# Patient Record
Sex: FEMALE | Race: WHITE | NOT HISPANIC OR LATINO | Employment: OTHER | URBAN - NONMETROPOLITAN AREA
[De-identification: names, ages, dates, MRNs, and addresses within clinical notes are randomized per-mention and may not be internally consistent; named-entity substitution may affect disease eponyms.]

---

## 2018-10-01 ENCOUNTER — OFFICE VISIT (OUTPATIENT)
Dept: GASTROENTEROLOGY | Facility: CLINIC | Age: 28
End: 2018-10-01

## 2018-10-01 VITALS
WEIGHT: 227 LBS | BODY MASS INDEX: 36.48 KG/M2 | OXYGEN SATURATION: 98 % | DIASTOLIC BLOOD PRESSURE: 77 MMHG | HEART RATE: 75 BPM | HEIGHT: 66 IN | SYSTOLIC BLOOD PRESSURE: 118 MMHG

## 2018-10-01 DIAGNOSIS — R19.7 DIARRHEA, UNSPECIFIED TYPE: Primary | ICD-10-CM

## 2018-10-01 DIAGNOSIS — K21.9 GASTROESOPHAGEAL REFLUX DISEASE, ESOPHAGITIS PRESENCE NOT SPECIFIED: ICD-10-CM

## 2018-10-01 PROCEDURE — 99243 OFF/OP CNSLTJ NEW/EST LOW 30: CPT | Performed by: PHYSICIAN ASSISTANT

## 2018-10-01 RX ORDER — RANITIDINE 150 MG/1
150 TABLET ORAL 2 TIMES DAILY
COMMUNITY
End: 2018-10-01 | Stop reason: SDUPTHER

## 2018-10-01 RX ORDER — AZITHROMYCIN 1 G
1 PACKET (EA) ORAL ONCE
COMMUNITY
End: 2018-12-07

## 2018-10-01 RX ORDER — RANITIDINE 150 MG/1
150 TABLET ORAL 2 TIMES DAILY PRN
Qty: 60 TABLET | Refills: 5 | Status: ON HOLD | OUTPATIENT
Start: 2018-10-01 | End: 2020-03-13

## 2018-10-01 RX ORDER — ONDANSETRON 4 MG/1
4 TABLET, FILM COATED ORAL EVERY 8 HOURS PRN
Status: ON HOLD | COMMUNITY
End: 2021-04-22

## 2018-10-01 NOTE — PROGRESS NOTES
: 1990    Chief Complaint   Patient presents with   • Nausea   • Diarrhea       Urszula Gonzalez is a 28 y.o. female who presents to the office today as a consultation from KERV[facility] for evaluation of Nausea and Diarrhea.    History of Present Illness:  For over 1 week, she has been having diarrhea. When symptoms first started she had fever which lasted 4-5 days and a headache. She had 15-20 BMs per day. At first, she had blood and mucous stools. Her stools changed from watery to bloody to mucous. She had 2 good days but then noticed that her diarrhea returned last night. Also had episodes of fecal incontinence. She is taking Bactrim (for UTI) for the past 4-5 days and is also taking Z-katie. She had stool testing which showed positive Campylobacter. She felt terrible and had IV fluids due to dehydration during this time. Her appetite is still poor. She had vomiting during the first few days of her illness but this has resolved now. She has very severe acid reflux and water makes this much worse. She had gastric sleeve surgery approx 2 years ago. She takes OTC Zantac 150 mg up to 2 times daily as needed with some relief. She was seen in the ED at [facility] and was checked for influenza which was negative. She returned to the ED which she became worse and had CT scan which showed colitis and was given Lomotil which seemed to help. She denies any weight loss.     Before this illness, she has had intermittent and brief episodes of GI problems. She had constipation during her 2 pregnancies. She has noticed undigested food in the toilet and will have fluctuating bowel movement consistencies. No previous rectal bleeding. Paternal grandfather had colon cancer. Sister has been diagnosed with celiac disease. Mother has autoimmune disease.     Has history of salmonella from food poisoning and C diff afterwards in the past, approx 3 years ago. Also reports sores in mouth which are frequent and  cold sores when she is stressed.     Review of Systems   Constitutional: Positive for appetite change. Negative for chills, fatigue, fever and unexpected weight change.   HENT: Negative for trouble swallowing.    Eyes: Negative.    Respiratory: Negative for cough, choking, chest tightness and shortness of breath.    Cardiovascular: Negative for chest pain.   Gastrointestinal: Positive for abdominal distention, abdominal pain, blood in stool, diarrhea, nausea and rectal pain. Negative for anal bleeding, constipation and vomiting.   Endocrine: Negative.    Genitourinary: Negative for difficulty urinating.   Musculoskeletal: Negative for arthralgias.   Skin: Negative for color change, rash and wound.   Allergic/Immunologic: Negative for environmental allergies and food allergies.   Neurological: Negative for headaches.   Hematological: Does not bruise/bleed easily.   Psychiatric/Behavioral: Negative.        Past Medical History:   Diagnosis Date   • GERD (gastroesophageal reflux disease)        Past Surgical History:   Procedure Laterality Date   •  SECTION     • GASTRIC SLEEVE LAPAROSCOPIC     • TONSILLECTOMY         Family History   Problem Relation Age of Onset   • Thyroid disease Mother    • Graves' disease Mother    • Myasthenia gravis Mother    • Celiac disease Sister        Social History     Social History   • Marital status: Unknown     Social History Main Topics   • Smoking status: Never Smoker   • Smokeless tobacco: Never Used   • Alcohol use No   • Drug use: No   • Sexual activity: Defer     Other Topics Concern   • Not on file       Current Outpatient Prescriptions:   •  azithromycin (ZITHROMAX) 1 g powder, Take 1 packet by mouth 1 (One) Time., Disp: , Rfl:   •  Diphenoxylate-Atropine (LOMOTIL PO), Take  by mouth., Disp: , Rfl:   •  ondansetron (ZOFRAN) 4 MG tablet, Take 4 mg by mouth Every 8 (Eight) Hours As Needed for Nausea or Vomiting., Disp: , Rfl:   •  Sulfamethoxazole-Trimethoprim (BACTRIM  "PO), Take  by mouth., Disp: , Rfl:     Allergies:   Ciprofloxacin    Vitals:  /77 (BP Location: Right arm, Patient Position: Sitting, Cuff Size: Adult)   Pulse 75   Ht 167.6 cm (66\")   Wt 103 kg (227 lb)   SpO2 98%   BMI 36.64 kg/m²     Physical Exam   Constitutional: She is oriented to person, place, and time. She appears well-developed and well-nourished. No distress.   HENT:   Head: Normocephalic and atraumatic.   Nose: Nose normal.   Mouth/Throat: Oropharynx is clear and moist.   Eyes: Conjunctivae are normal. Right eye exhibits no discharge. Left eye exhibits no discharge. No scleral icterus.   Neck: Normal range of motion. No JVD present.   Cardiovascular: Normal rate, regular rhythm and normal heart sounds.  Exam reveals no gallop and no friction rub.    No murmur heard.  Pulmonary/Chest: Effort normal and breath sounds normal. No respiratory distress. She has no wheezes. She has no rales. She exhibits no tenderness.   Abdominal: Soft. She exhibits no mass. There is tenderness (generalized but worst in RUQ and epigastric).   Increased bowel sounds   Musculoskeletal: Normal range of motion. She exhibits no edema or deformity.   Neurological: She is alert and oriented to person, place, and time. Coordination normal.   Skin: Skin is warm and dry. No rash noted. She is not diaphoretic. No erythema.   Psychiatric: She has a normal mood and affect. Her behavior is normal. Judgment and thought content normal.   Vitals reviewed.      Assessment/Plan:  1. Diarrhea, unspecified type    2. Gastroesophageal reflux disease, esophagitis presence not specified      Because her illness has been short and recent, I recommend that she monitor symptoms for continued improvement for now. She will call our office in a few days to update on symptoms. For now, she will start daily probiotic and was given samples of Align to take once daily. Discontinue Bactrim. Finish Z-katie. We will request records from PCP and from ED " visits at [facility]. For GERD, she will take Zantac 300 mg BID. At next visit, EGD and colonoscopy will be arranged.     New Medications Ordered This Visit   Medications   • raNITIdine (ZANTAC) 150 MG tablet     Sig: Take 1 tablet by mouth 2 (Two) Times a Day As Needed for Heartburn.     Dispense:  60 tablet     Refill:  5           Return in about 6 weeks (around 11/12/2018) for recheck diarrhea.      Electronically signed 10/1/2018 8:34 PM  Urszula Mccormick PA-C, [facility]

## 2018-10-02 ENCOUNTER — TELEPHONE (OUTPATIENT)
Dept: GASTROENTEROLOGY | Facility: CLINIC | Age: 28
End: 2018-10-02

## 2018-10-02 NOTE — TELEPHONE ENCOUNTER
I reviewed records received from [facility]. It looks like I received most of the stool results but I don't see her positive result for campylobacter which she reported. Please request this.     Also- please ck on status of request from [facility], labs and stool results from there. PCP sent the CT report. Thanks.

## 2018-10-04 ENCOUNTER — TELEPHONE (OUTPATIENT)
Dept: UROLOGY | Facility: CLINIC | Age: 28
End: 2018-10-04

## 2018-10-04 DIAGNOSIS — R19.7 DIARRHEA, UNSPECIFIED TYPE: ICD-10-CM

## 2018-10-04 DIAGNOSIS — R93.2 ABNORMAL CT SCAN, GALLBLADDER: Primary | ICD-10-CM

## 2018-10-04 DIAGNOSIS — R10.811 RIGHT UPPER QUADRANT ABDOMINAL TENDERNESS WITHOUT REBOUND TENDERNESS: ICD-10-CM

## 2018-10-04 NOTE — TELEPHONE ENCOUNTER
Spoke with patient, we still dont have all results from PCP, especially campylobacter stool result.     I have ordered US gb, please arrange

## 2018-10-04 NOTE — TELEPHONE ENCOUNTER
Patient called to check if we had received her records for the [place] [facility].  I told her we had received some from them.  She would like for you to look at them and call her back because she has a few questions about them.

## 2018-10-05 NOTE — TELEPHONE ENCOUNTER
I called [facility] again and spoke to Chantelle in the lab, she found the lab Urszula is needing and said she would fax it right away

## 2018-10-05 NOTE — TELEPHONE ENCOUNTER
Also, patient had requested a call back when we received all the stool results from her PCP. Please let her know that we received stool result showing positive campylobacter and positive leukocytes (sign of an infection).

## 2018-10-16 ENCOUNTER — HOSPITAL ENCOUNTER (OUTPATIENT)
Dept: ULTRASOUND IMAGING | Facility: HOSPITAL | Age: 28
Discharge: HOME | End: 2018-10-16
Admitting: PHYSICIAN ASSISTANT

## 2018-10-16 ENCOUNTER — TELEPHONE (OUTPATIENT)
Dept: GASTROENTEROLOGY | Facility: CLINIC | Age: 28
End: 2018-10-16

## 2018-10-16 DIAGNOSIS — R93.2 ABNORMAL CT SCAN, GALLBLADDER: ICD-10-CM

## 2018-10-16 DIAGNOSIS — R10.811 RIGHT UPPER QUADRANT ABDOMINAL TENDERNESS WITHOUT REBOUND TENDERNESS: ICD-10-CM

## 2018-10-16 DIAGNOSIS — R19.7 DIARRHEA, UNSPECIFIED TYPE: ICD-10-CM

## 2018-10-16 PROCEDURE — 76705 ECHO EXAM OF ABDOMEN: CPT | Performed by: RADIOLOGY

## 2018-10-16 PROCEDURE — 76705 ECHO EXAM OF ABDOMEN: CPT

## 2018-10-23 ENCOUNTER — TELEPHONE (OUTPATIENT)
Dept: UROLOGY | Facility: CLINIC | Age: 28
End: 2018-10-23

## 2018-10-23 DIAGNOSIS — R10.11 RUQ ABDOMINAL PAIN: ICD-10-CM

## 2018-10-23 DIAGNOSIS — R11.2 INTRACTABLE VOMITING WITH NAUSEA, UNSPECIFIED VOMITING TYPE: Primary | ICD-10-CM

## 2018-10-23 DIAGNOSIS — R19.7 DIARRHEA, UNSPECIFIED TYPE: ICD-10-CM

## 2018-10-23 NOTE — TELEPHONE ENCOUNTER
I tried calling patient to let her know Urszula has ordered a hidascan. Unable to reach her. I worked the order and sent to one call to be authorized and scheduled.

## 2018-11-01 ENCOUNTER — HOSPITAL ENCOUNTER (OUTPATIENT)
Dept: NUCLEAR MEDICINE | Facility: HOSPITAL | Age: 28
Discharge: HOME | End: 2018-11-01

## 2018-11-01 DIAGNOSIS — R11.2 INTRACTABLE VOMITING WITH NAUSEA, UNSPECIFIED VOMITING TYPE: ICD-10-CM

## 2018-11-01 DIAGNOSIS — R19.7 DIARRHEA, UNSPECIFIED TYPE: ICD-10-CM

## 2018-11-01 DIAGNOSIS — R10.11 RUQ ABDOMINAL PAIN: ICD-10-CM

## 2018-11-01 PROCEDURE — 0 TECHNETIUM TC 99M MEBROFENIN KIT: Performed by: PHYSICIAN ASSISTANT

## 2018-11-01 PROCEDURE — 78226 HEPATOBILIARY SYSTEM IMAGING: CPT

## 2018-11-01 PROCEDURE — A9537 TC99M MEBROFENIN: HCPCS | Performed by: PHYSICIAN ASSISTANT

## 2018-11-01 PROCEDURE — 78226 HEPATOBILIARY SYSTEM IMAGING: CPT | Performed by: RADIOLOGY

## 2018-11-01 RX ORDER — KIT FOR THE PREPARATION OF TECHNETIUM TC 99M MEBROFENIN 45 MG/10ML
1 INJECTION, POWDER, LYOPHILIZED, FOR SOLUTION INTRAVENOUS
Status: COMPLETED | OUTPATIENT
Start: 2018-11-01 | End: 2018-11-01

## 2018-11-01 RX ADMIN — MEBROFENIN 1 DOSE: 45 INJECTION, POWDER, LYOPHILIZED, FOR SOLUTION INTRAVENOUS at 12:05

## 2018-11-05 ENCOUNTER — TELEPHONE (OUTPATIENT)
Dept: GASTROENTEROLOGY | Facility: CLINIC | Age: 28
End: 2018-11-05

## 2018-11-06 NOTE — TELEPHONE ENCOUNTER
Please let her know that it showed 38% function, which is barely above normal. (35% is normal) please have her come in for appt to discuss.

## 2018-12-07 ENCOUNTER — HOSPITAL ENCOUNTER (OUTPATIENT)
Dept: GENERAL RADIOLOGY | Facility: HOSPITAL | Age: 28
Discharge: HOME | End: 2018-12-07
Admitting: PHYSICIAN ASSISTANT

## 2018-12-07 ENCOUNTER — LAB (OUTPATIENT)
Dept: LAB | Facility: HOSPITAL | Age: 28
End: 2018-12-07

## 2018-12-07 ENCOUNTER — OFFICE VISIT (OUTPATIENT)
Dept: GASTROENTEROLOGY | Facility: CLINIC | Age: 28
End: 2018-12-07

## 2018-12-07 VITALS
HEIGHT: 66 IN | HEART RATE: 69 BPM | BODY MASS INDEX: 37.51 KG/M2 | OXYGEN SATURATION: 98 % | SYSTOLIC BLOOD PRESSURE: 116 MMHG | WEIGHT: 233.4 LBS | DIASTOLIC BLOOD PRESSURE: 79 MMHG

## 2018-12-07 DIAGNOSIS — R12 HEARTBURN: ICD-10-CM

## 2018-12-07 DIAGNOSIS — R10.9 ABDOMINAL CRAMPING: ICD-10-CM

## 2018-12-07 DIAGNOSIS — R19.7 DIARRHEA, UNSPECIFIED TYPE: ICD-10-CM

## 2018-12-07 DIAGNOSIS — R10.9 ABDOMINAL CRAMPING: Primary | ICD-10-CM

## 2018-12-07 LAB
ALBUMIN SERPL-MCNC: 4.3 G/DL (ref 3.5–5)
ALBUMIN/GLOB SERPL: 1.6 G/DL (ref 1.5–2.5)
ALP SERPL-CCNC: 73 U/L (ref 35–104)
ALT SERPL W P-5'-P-CCNC: 14 U/L (ref 10–36)
ANION GAP SERPL CALCULATED.3IONS-SCNC: 5 MMOL/L (ref 3.6–11.2)
AST SERPL-CCNC: 19 U/L (ref 10–30)
BILIRUB SERPL-MCNC: 0.3 MG/DL (ref 0.2–1.8)
BUN BLD-MCNC: 10 MG/DL (ref 7–21)
BUN/CREAT SERPL: 14.5 (ref 7–25)
CALCIUM SPEC-SCNC: 9 MG/DL (ref 7.7–10)
CHLORIDE SERPL-SCNC: 112 MMOL/L (ref 99–112)
CO2 SERPL-SCNC: 24 MMOL/L (ref 24.3–31.9)
CREAT BLD-MCNC: 0.69 MG/DL (ref 0.43–1.29)
CRP SERPL-MCNC: 0.71 MG/DL (ref 0–0.99)
GFR SERPL CREATININE-BSD FRML MDRD: 102 ML/MIN/1.73
GLOBULIN UR ELPH-MCNC: 2.7 GM/DL
GLUCOSE BLD-MCNC: 83 MG/DL (ref 70–110)
LIPASE SERPL-CCNC: 39 U/L (ref 13–60)
OSMOLALITY SERPL CALC.SUM OF ELEC: 279.4 MOSM/KG (ref 273–305)
POTASSIUM BLD-SCNC: 4.5 MMOL/L (ref 3.5–5.3)
PROT SERPL-MCNC: 7 G/DL (ref 6–8)
SODIUM BLD-SCNC: 141 MMOL/L (ref 135–153)

## 2018-12-07 PROCEDURE — 86140 C-REACTIVE PROTEIN: CPT

## 2018-12-07 PROCEDURE — 74019 RADEX ABDOMEN 2 VIEWS: CPT | Performed by: RADIOLOGY

## 2018-12-07 PROCEDURE — 86677 HELICOBACTER PYLORI ANTIBODY: CPT

## 2018-12-07 PROCEDURE — 99213 OFFICE O/P EST LOW 20 MIN: CPT | Performed by: PHYSICIAN ASSISTANT

## 2018-12-07 PROCEDURE — 74019 RADEX ABDOMEN 2 VIEWS: CPT

## 2018-12-07 PROCEDURE — 80053 COMPREHEN METABOLIC PANEL: CPT

## 2018-12-07 PROCEDURE — 36415 COLL VENOUS BLD VENIPUNCTURE: CPT

## 2018-12-07 PROCEDURE — 83690 ASSAY OF LIPASE: CPT

## 2018-12-07 RX ORDER — PANTOPRAZOLE SODIUM 40 MG/1
40 TABLET, DELAYED RELEASE ORAL
Qty: 30 TABLET | Refills: 5 | Status: SHIPPED | OUTPATIENT
Start: 2018-12-07 | End: 2019-09-09 | Stop reason: SDUPTHER

## 2018-12-07 NOTE — PROGRESS NOTES
": 1990    Chief Complaint   Patient presents with   • Diarrhea       Urszula Gonzalez is a 28 y.o. female who presents to the office today as a follow up appointment regarding Diarrhea.    History of Present Illness:  She will have intermittent diarrhea 1-2 times per week. Her stools are described as fluctuating between diarrhea and \"shreds\" of stool and points to #5-6 on the [place] Stool Scale but also admits that she has stools all over the chart. No rectal bleeding now. Her acute diarrheal illness has now resolved. She had HIDA 38% since her last visit. She had hemorrhoids around the time of her severe illness but they are better now. US gb normal. She takes ranitidine 150 mg BID for heartburn which does seem to help. There is also a bitter taste in her mouth which is severe at times.     Review of Systems   Constitutional: Positive for appetite change. Negative for chills, fatigue, fever and unexpected weight change.   HENT: Negative for trouble swallowing.    Eyes: Negative.    Respiratory: Negative for cough, choking, chest tightness and shortness of breath.    Cardiovascular: Negative for chest pain.   Gastrointestinal: Positive for diarrhea and nausea. Negative for abdominal distention, abdominal pain, anal bleeding, blood in stool, constipation, rectal pain and vomiting.   Endocrine: Negative.    Genitourinary: Negative for difficulty urinating.   Musculoskeletal: Negative for arthralgias.   Skin: Negative for color change, rash and wound.   Allergic/Immunologic: Negative for environmental allergies and food allergies.   Neurological: Negative for headaches.   Hematological: Does not bruise/bleed easily.   Psychiatric/Behavioral: Negative.        Past Medical History:   Diagnosis Date   • GERD (gastroesophageal reflux disease)        Past Surgical History:   Procedure Laterality Date   •  SECTION     • GASTRIC SLEEVE LAPAROSCOPIC     • TONSILLECTOMY         Family History   Problem Relation " "Age of Onset   • Thyroid disease Mother    • Graves' disease Mother    • Myasthenia gravis Mother    • Celiac disease Sister        Social History     Socioeconomic History   • Marital status: Unknown     Spouse name: Not on file   • Number of children: Not on file   • Years of education: Not on file   • Highest education level: Not on file   Tobacco Use   • Smoking status: Never Smoker   • Smokeless tobacco: Never Used   Substance and Sexual Activity   • Alcohol use: No   • Drug use: No   • Sexual activity: Defer       Current Outpatient Medications:   •  ondansetron (ZOFRAN) 4 MG tablet, Take 4 mg by mouth Every 8 (Eight) Hours As Needed for Nausea or Vomiting., Disp: , Rfl:   •  raNITIdine (ZANTAC) 150 MG tablet, Take 1 tablet by mouth 2 (Two) Times a Day As Needed for Heartburn., Disp: 60 tablet, Rfl: 5    Allergies:   Ciprofloxacin    Vitals:  /79 (BP Location: Left arm, Patient Position: Sitting, Cuff Size: Small Adult)   Pulse 69   Ht 167.6 cm (66\")   Wt 106 kg (233 lb 6.4 oz)   SpO2 98%   BMI 37.67 kg/m²     Physical Exam   Constitutional: She is oriented to person, place, and time. She appears well-developed and well-nourished. No distress.   HENT:   Head: Normocephalic and atraumatic.   Nose: Nose normal.   Mouth/Throat: Oropharynx is clear and moist.   Eyes: Conjunctivae are normal. Right eye exhibits no discharge. Left eye exhibits no discharge. No scleral icterus.   Neck: Normal range of motion. No JVD present.   Cardiovascular: Normal rate, regular rhythm and normal heart sounds. Exam reveals no gallop and no friction rub.   No murmur heard.  Pulmonary/Chest: Effort normal and breath sounds normal. No respiratory distress. She has no wheezes. She has no rales. She exhibits no tenderness.   Abdominal: Soft. Bowel sounds are normal. She exhibits no mass. There is tenderness (RUQ and periumbilical).   Musculoskeletal: Normal range of motion. She exhibits no edema or deformity.   Neurological: " She is alert and oriented to person, place, and time. Coordination normal.   Skin: Skin is warm and dry. No rash noted. She is not diaphoretic. No erythema.   Psychiatric: She has a normal mood and affect. Her behavior is normal. Judgment and thought content normal.   Vitals reviewed.      Assessment/Plan:  1. Abdominal cramping    2. Diarrhea, unspecified type    3. Heartburn      Orders Placed This Encounter   Procedures   • XR Abdomen Flat & Upright   • Comprehensive Metabolic Panel   • C-reactive Protein   • Lipase   • Helicobacter Pylori, IgA IgG IgM   • Ambulatory Referral to Gastroenterology   • CBC & Differential     New Medications Ordered This Visit   Medications   • pantoprazole (PROTONIX) 40 MG EC tablet     Sig: Take 1 tablet by mouth Every Morning Before Breakfast.     Dispense:  30 tablet     Refill:  5     More recommendations will be made after results have been reviewed. She will have EGD and colonoscopy at [facility] after consultation with GI provider there due to preference not to have procedures with general surgery locally. She will start taking Protonix 40 mg once daily 30 minutes before a meal for treatment of heartburn. She will be called with her results. Follow up with me after procedures to review results.         Return if symptoms worsen or fail to improve.      Electronically signed 12/7/2018 4:25 PM  Urszula Mccormick PA-C, [facility]

## 2018-12-10 ENCOUNTER — TELEPHONE (OUTPATIENT)
Dept: GASTROENTEROLOGY | Facility: CLINIC | Age: 28
End: 2018-12-10

## 2018-12-10 DIAGNOSIS — K59.04 CHRONIC IDIOPATHIC CONSTIPATION: ICD-10-CM

## 2018-12-10 DIAGNOSIS — A04.8 H. PYLORI INFECTION: Primary | ICD-10-CM

## 2018-12-10 LAB
H PYLORI IGA SER IA-ACNC: <9 UNITS (ref 0–8.9)
H PYLORI IGG SER IA-ACNC: 0.16 INDEX VALUE (ref 0–0.79)
H PYLORI IGM SER-ACNC: 14.9 UNITS (ref 0–8.9)

## 2018-12-10 RX ORDER — METRONIDAZOLE 500 MG/1
500 TABLET ORAL
Qty: 28 TABLET | Refills: 0 | Status: SHIPPED | OUTPATIENT
Start: 2018-12-10 | End: 2018-12-24

## 2018-12-10 RX ORDER — NICOTINE POLACRILEX 4 MG/1
20 GUM, CHEWING ORAL
Qty: 28 EACH | Refills: 0 | Status: SHIPPED | OUTPATIENT
Start: 2018-12-10 | End: 2018-12-24

## 2018-12-10 RX ORDER — POLYETHYLENE GLYCOL 3350 17 G/17G
17 POWDER, FOR SOLUTION ORAL DAILY
Qty: 510 G | Refills: 2 | Status: SHIPPED | OUTPATIENT
Start: 2018-12-10 | End: 2021-04-24 | Stop reason: HOSPADM

## 2018-12-10 RX ORDER — CLARITHROMYCIN 500 MG/1
500 TABLET, COATED ORAL
Qty: 28 TABLET | Refills: 0 | Status: SHIPPED | OUTPATIENT
Start: 2018-12-10 | End: 2018-12-24

## 2018-12-10 NOTE — TELEPHONE ENCOUNTER
Called lab about CBC not being collected, no explanation, mistake.     Patient's abdominal film showed constipation which is moderate. She needs to complete bowel cleanse but planning to have this completed when she has colonoscopy by MICAH FERNANDES. Please let her know that after her colonoscopy, she should start Miralax 17 g once daily to keep this from happening in the future.     Her lab showed pos H pylori. I have sent flgyl + clarithro + prilosec to take for tx. Please let her know to stop taking protonix during this therapy and resume after. Schedule f/u with me in 8 weeks. Stop protonix 2 weeks before next appt so that she can have breath test for confirmation of cure. Can take Zantac only during this period.

## 2018-12-12 NOTE — TELEPHONE ENCOUNTER
Pt called over medications I went over all her medications and how to take them she seemed a little confused on the protonix because she never received that prescription from the pharmacy I told her if she had any other questions to call us back

## 2018-12-20 ENCOUNTER — TELEPHONE (OUTPATIENT)
Dept: GASTROENTEROLOGY | Facility: CLINIC | Age: 28
End: 2018-12-20

## 2018-12-21 RX ORDER — DIPHENHYDRAMINE, LIDOCAINE, NYSTATIN
5 KIT ORAL 4 TIMES DAILY
Qty: 60 ML | Refills: 0 | Status: SHIPPED | OUTPATIENT
Start: 2018-12-21 | End: 2019-03-01

## 2018-12-21 RX ORDER — POLYETHYLENE GLYCOL 3350 17 G/17G
POWDER, FOR SOLUTION ORAL
Qty: 765 G | Refills: 5 | Status: SHIPPED | OUTPATIENT
Start: 2018-12-21 | End: 2019-03-01 | Stop reason: SDUPTHER

## 2019-01-03 ENCOUNTER — TELEPHONE (OUTPATIENT)
Dept: GASTROENTEROLOGY | Facility: CLINIC | Age: 29
End: 2019-01-03

## 2019-03-01 ENCOUNTER — OFFICE VISIT (OUTPATIENT)
Dept: GASTROENTEROLOGY | Facility: CLINIC | Age: 29
End: 2019-03-01

## 2019-03-01 ENCOUNTER — LAB (OUTPATIENT)
Dept: LAB | Facility: HOSPITAL | Age: 29
End: 2019-03-01

## 2019-03-01 VITALS
BODY MASS INDEX: 37.93 KG/M2 | SYSTOLIC BLOOD PRESSURE: 124 MMHG | DIASTOLIC BLOOD PRESSURE: 73 MMHG | OXYGEN SATURATION: 98 % | WEIGHT: 236 LBS | HEART RATE: 78 BPM | HEIGHT: 66 IN

## 2019-03-01 DIAGNOSIS — Z86.19 HISTORY OF HELICOBACTER PYLORI INFECTION: ICD-10-CM

## 2019-03-01 DIAGNOSIS — Z86.19 HISTORY OF HELICOBACTER PYLORI INFECTION: Primary | ICD-10-CM

## 2019-03-01 DIAGNOSIS — R11.0 NAUSEA: ICD-10-CM

## 2019-03-01 DIAGNOSIS — R10.9 ABDOMINAL CRAMPING: ICD-10-CM

## 2019-03-01 PROCEDURE — 83013 H PYLORI (C-13) BREATH: CPT

## 2019-03-01 PROCEDURE — 99213 OFFICE O/P EST LOW 20 MIN: CPT | Performed by: PHYSICIAN ASSISTANT

## 2019-03-01 RX ORDER — FLUOXETINE 10 MG/1
10 CAPSULE ORAL DAILY
Status: ON HOLD | COMMUNITY
End: 2020-03-13

## 2019-03-01 NOTE — PROGRESS NOTES
": 1990    Chief Complaint   Patient presents with   • H. Pylori       Urszula Gonzalez is a 28 y.o. female who presents to the office today as a follow up appointment regarding H. Pylori.    History of Present Illness:  Currently, she reports the same intermittent symptoms as previous. BMs are described as daily, stools vary on the stool scale. She does not feel constipated. Points to type 4-5, sometimes #1 on the [place] Stool Scale. Nausea is present and intermittent without vomiting. She has generalized abdominal cramping which is mild and intermittent. Heartburn is currently very severe and she has only been taking Zantac as directed and no PPIs for the past 2 weeks. Having acid reflux and \"foam\" reguritation. She has planned for appt in [facility] and plans to have endoscopy there. H pylori was positive on serum testing and she took Flagyl, clarithromycin and prilosec exactly as directed for treatment.     Review of Systems   Constitutional: Positive for appetite change. Negative for chills, fatigue, fever and unexpected weight change.   HENT: Negative for trouble swallowing.    Eyes: Negative.    Respiratory: Negative for cough, choking, chest tightness and shortness of breath.    Cardiovascular: Negative for chest pain.   Gastrointestinal: Positive for constipation, diarrhea and nausea. Negative for abdominal distention, abdominal pain, anal bleeding, blood in stool, rectal pain and vomiting.   Endocrine: Negative.    Genitourinary: Negative for difficulty urinating.   Musculoskeletal: Negative for arthralgias.   Skin: Negative for color change, rash and wound.   Allergic/Immunologic: Negative for environmental allergies and food allergies.   Neurological: Negative for headaches.   Hematological: Does not bruise/bleed easily.   Psychiatric/Behavioral: Negative.        Past Medical History:   Diagnosis Date   • GERD (gastroesophageal reflux disease)        Past Surgical History:   Procedure Laterality " "Date   •  SECTION     • GASTRIC SLEEVE LAPAROSCOPIC     • TONSILLECTOMY         Family History   Problem Relation Age of Onset   • Thyroid disease Mother    • Graves' disease Mother    • Myasthenia gravis Mother    • Celiac disease Sister        Social History     Socioeconomic History   • Marital status: Unknown     Spouse name: Not on file   • Number of children: Not on file   • Years of education: Not on file   • Highest education level: Not on file   Tobacco Use   • Smoking status: Never Smoker   • Smokeless tobacco: Never Used   Substance and Sexual Activity   • Alcohol use: No   • Drug use: No   • Sexual activity: Defer       Current Outpatient Medications:   •  FLUoxetine (PROzac) 10 MG capsule, Take 10 mg by mouth Daily., Disp: , Rfl:   •  nystatin susp + lidocaine viscous (MAGIC MOUTHWASH) oral suspension, Swish and swallow 5 mL 4 (Four) Times a Day., Disp: 60 mL, Rfl: 0  •  ondansetron (ZOFRAN) 4 MG tablet, Take 4 mg by mouth Every 8 (Eight) Hours As Needed for Nausea or Vomiting., Disp: , Rfl:   •  pantoprazole (PROTONIX) 40 MG EC tablet, Take 1 tablet by mouth Every Morning Before Breakfast., Disp: 30 tablet, Rfl: 5  •  polyethylene glycol (MIRALAX) powder, Take 17 g by mouth Daily., Disp: 510 g, Rfl: 2  •  raNITIdine (ZANTAC) 150 MG tablet, Take 1 tablet by mouth 2 (Two) Times a Day As Needed for Heartburn., Disp: 60 tablet, Rfl: 5    Allergies:   Ciprofloxacin    Vitals:  /73 (BP Location: Right arm, Patient Position: Sitting, Cuff Size: Adult)   Pulse 78   Ht 167.6 cm (66\")   Wt 107 kg (236 lb)   SpO2 98%   BMI 38.09 kg/m²     Physical Exam   Constitutional: She is oriented to person, place, and time. She appears well-developed and well-nourished. No distress.   HENT:   Head: Normocephalic and atraumatic.   Nose: Nose normal.   Mouth/Throat: Oropharynx is clear and moist.   Eyes: Conjunctivae are normal. Right eye exhibits no discharge. Left eye exhibits no discharge. No scleral " icterus.   Neck: Normal range of motion. No JVD present.   Cardiovascular: Normal rate, regular rhythm and normal heart sounds. Exam reveals no gallop and no friction rub.   No murmur heard.  Pulmonary/Chest: Effort normal and breath sounds normal. No respiratory distress. She has no wheezes. She has no rales. She exhibits no tenderness.   Abdominal: Soft. Bowel sounds are normal. She exhibits no mass. There is tenderness (epigastric, mild).   Musculoskeletal: Normal range of motion. She exhibits no edema or deformity.   Neurological: She is alert and oriented to person, place, and time. Coordination normal.   Skin: Skin is warm and dry. No rash noted. She is not diaphoretic. No erythema.   Psychiatric: She has a normal mood and affect. Her behavior is normal. Judgment and thought content normal.   Vitals reviewed.      Assessment/Plan:  1. History of Helicobacter pylori infection    2. Nausea    3. Abdominal cramping      Orders Placed This Encounter   Procedures   • H. Pylori Breath Test - , Lung     She will have urease breath test today and will be called with those results. Continue current management. Keep [facility] appt for endoscopic procedures. Can resume Protonix later today after breath test for relief of heartburn. Come back as needed.         Electronically signed 3/1/2019 3:51 PM  Urszula Mccormick PA-C, [facility]

## 2019-03-04 LAB — UREA BREATH TEST QL: NEGATIVE

## 2019-03-12 ENCOUNTER — APPOINTMENT (OUTPATIENT)
Dept: LAB | Facility: HOSPITAL | Age: 29
End: 2019-03-12

## 2019-03-12 ENCOUNTER — OFFICE VISIT (OUTPATIENT)
Dept: GASTROENTEROLOGY | Facility: CLINIC | Age: 29
End: 2019-03-12

## 2019-03-12 VITALS — DIASTOLIC BLOOD PRESSURE: 65 MMHG | HEART RATE: 66 BPM | SYSTOLIC BLOOD PRESSURE: 107 MMHG

## 2019-03-12 DIAGNOSIS — R10.11 RIGHT UPPER QUADRANT ABDOMINAL PAIN: Primary | ICD-10-CM

## 2019-03-12 DIAGNOSIS — K21.9 GASTROESOPHAGEAL REFLUX DISEASE WITHOUT ESOPHAGITIS: ICD-10-CM

## 2019-03-12 DIAGNOSIS — R14.0 BLOATING: ICD-10-CM

## 2019-03-12 PROCEDURE — 83516 IMMUNOASSAY NONANTIBODY: CPT | Performed by: INTERNAL MEDICINE

## 2019-03-12 PROCEDURE — 86255 FLUORESCENT ANTIBODY SCREEN: CPT | Performed by: INTERNAL MEDICINE

## 2019-03-12 PROCEDURE — 36415 COLL VENOUS BLD VENIPUNCTURE: CPT | Performed by: INTERNAL MEDICINE

## 2019-03-12 PROCEDURE — 99204 OFFICE O/P NEW MOD 45 MIN: CPT | Performed by: INTERNAL MEDICINE

## 2019-03-12 PROCEDURE — 82784 ASSAY IGA/IGD/IGG/IGM EACH: CPT | Performed by: INTERNAL MEDICINE

## 2019-03-12 NOTE — PROGRESS NOTES
PCP:  Katina Ibarra APRN Chasteen, Sara, PA-C  [address] [facility]  [address]  [place] [address]    Chief Complaint   Patient presents with   • Nausea     new patient   • Abdominal Pain        HPI   The patient is a 28-year-old female who has had troubles with nausea and abdominal pain.  The abdominal pain can be in the lower abdomen.  At times it can be in the upper abdomen.  Has been in the right upper quadrant at times.  She does have reflux symptoms.  She has no dysphasia.  She does feel foam coming up into her mouth at times.  She does have mouth ulcers.  She alternates between diarrhea and constipation.  She had a gallbladder workup in [place] in the past.  An ultrasound was normal.  It sounds like her HIDA scan had about a 38% ejection fraction.  She has not been tested for celiac disease.  She has increased gas and belching.  She is post gastric sleeve.  She lost 60 pounds but is gained 20 pounds back.  She has had bacterial infections of the colon including Salmonella, Campylobacter, and C. difficile.  Protonix has helped her reflux significantly.  She is a non-smoker.  Her paternal grandfather may have had colon cancer, or he may have only had prostate cancer.  There is no ulcerative colitis or Crohn's disease in the family.  There is no first-degree relatives with colorectal neoplasia.  She does have a sister with celiac disease.    Allergies   Allergen Reactions   • Ciprofloxacin Hives          Current Outpatient Medications:   •  FLUoxetine (PROzac) 10 MG capsule, Take 10 mg by mouth Daily., Disp: , Rfl:   •  ondansetron (ZOFRAN) 4 MG tablet, Take 4 mg by mouth Every 8 (Eight) Hours As Needed for Nausea or Vomiting., Disp: , Rfl:   •  pantoprazole (PROTONIX) 40 MG EC tablet, Take 1 tablet by mouth Every Morning Before Breakfast., Disp: 30 tablet, Rfl: 5  •  polyethylene glycol (MIRALAX) powder, Take 17 g by mouth Daily., Disp: 510 g, Rfl: 2  •  raNITIdine (ZANTAC) 150 MG tablet, Take 1 tablet by  complains of pain/discomfort mouth 2 (Two) Times a Day As Needed for Heartburn., Disp: 60 tablet, Rfl: 5     Past Medical History:   Diagnosis Date   • GERD (gastroesophageal reflux disease)        Past Surgical History:   Procedure Laterality Date   •  SECTION     • GASTRIC SLEEVE LAPAROSCOPIC     • TONSILLECTOMY          Social History     Socioeconomic History   • Marital status: Unknown     Spouse name: Not on file   • Number of children: Not on file   • Years of education: Not on file   • Highest education level: Not on file   Social Needs   • Financial resource strain: Not on file   • Food insecurity - worry: Not on file   • Food insecurity - inability: Not on file   • Transportation needs - medical: Not on file   • Transportation needs - non-medical: Not on file   Occupational History   • Not on file   Tobacco Use   • Smoking status: Never Smoker   • Smokeless tobacco: Never Used   Substance and Sexual Activity   • Alcohol use: No   • Drug use: No   • Sexual activity: Defer   Other Topics Concern   • Not on file   Social History Narrative   • Not on file        Family History   Problem Relation Age of Onset   • Thyroid disease Mother    • Graves' disease Mother    • Myasthenia gravis Mother    • Celiac disease Sister         Review of Systems   Constitutional: Negative for fever and unexpected weight loss.   HENT: Negative for trouble swallowing.    Eyes: Negative.    Respiratory: Negative.    Gastrointestinal: Positive for abdominal pain, constipation, diarrhea and nausea. Negative for abdominal distention, anal bleeding, blood in stool, rectal pain, vomiting, GERD and indigestion.   Endocrine: Negative.    Genitourinary: Negative.  Negative for dysuria, frequency and hematuria.   Musculoskeletal: Positive for arthralgias and myalgias.   Skin: Negative.    Allergic/Immunologic: Negative.    Neurological: Negative.    Hematological: Negative.    Psychiatric/Behavioral: Negative.         Vitals:    19 1549   BP: 107/65    Pulse: 66        Physical Exam   General Appearance: Alert, in no acute distress   Head: Normocephalic, without obvious abnormality, atraumatic   Eyes: Lids and lashes normal, conjunctivae and sclerae normal, no icterus, no pallor, corneas clear, PERRLA   Ears: Ears appear intact with no abnormalities noted   Throat: No oral lesions, no thrush, oral mucosa moist   Neck: No adenopathy, supple, trachea midline, no thyromegaly, no JVD   Lungs: Clear to auscultation,respirations regular, even and unlabored Heart: Regular rhythm and normal rate, normal S1 and S2, no murmur, no gallop, no rub, no click   Chest Wall: Symmetrical respiratory expansion   Abdomen: Normal bowel sounds, no masses, no organomegaly, soft non-tender, non-distended, no guarding, no rebound tenderness   Extremities: Moves all extremities well, no edema, no cyanosis, no redness   Skin: No bleeding, bruising or rash   Neurologic: Cranial nerves 2 - 12 grossly intact, no focal deficits       Urszula was seen today for nausea and abdominal pain.    Diagnoses and all orders for this visit:    Right upper quadrant abdominal pain  -     Celiac Comprehensive Panel    Gastroesophageal reflux disease without esophagitis    Bloating    Impressions and plan #1 history of gastroesophageal reflux and abdominal pain: She seems to be improved on the Protonix.  I would like to check a celiac panel.  Given her family history of celiac disease, it would be best to rule this out.  It could explain a lot of her symptoms.  I am going to suggest an upper endoscopy.  I would like to look for any signs of gastroparesis.  Gastroparesis is quite frequent after gastric surgery.  We would look for celiac disease as well.  We will look for peptic ulcer disease, Licea's esophagus, and esophagitis.  We will look for large hernias.  Ultimately, she may need evaluation of the colon if all else is negative and she continues to have symptoms.    Arley Pinto MD

## 2019-03-13 PROBLEM — R14.0 BLOATING: Status: ACTIVE | Noted: 2019-03-13

## 2019-03-13 PROBLEM — K21.9 GASTROESOPHAGEAL REFLUX DISEASE WITHOUT ESOPHAGITIS: Status: ACTIVE | Noted: 2019-03-13

## 2019-03-13 PROBLEM — R10.11 RIGHT UPPER QUADRANT ABDOMINAL PAIN: Status: ACTIVE | Noted: 2019-03-13

## 2019-03-14 LAB
ENDOMYSIUM IGA SER QL: NEGATIVE
GLIADIN PEPTIDE IGA SER-ACNC: 4 UNITS (ref 0–19)
GLIADIN PEPTIDE IGG SER-ACNC: 3 UNITS (ref 0–19)
IGA SERPL-MCNC: 263 MG/DL (ref 87–352)
TTG IGA SER-ACNC: <2 U/ML (ref 0–3)
TTG IGG SER-ACNC: <2 U/ML (ref 0–5)

## 2019-03-15 ENCOUNTER — OUTSIDE FACILITY SERVICE (OUTPATIENT)
Dept: GASTROENTEROLOGY | Facility: CLINIC | Age: 29
End: 2019-03-15

## 2019-03-15 ENCOUNTER — LAB REQUISITION (OUTPATIENT)
Dept: LAB | Facility: HOSPITAL | Age: 29
End: 2019-03-15

## 2019-03-15 DIAGNOSIS — R10.9 ABDOMINAL PAIN: ICD-10-CM

## 2019-03-15 PROCEDURE — 43239 EGD BIOPSY SINGLE/MULTIPLE: CPT | Performed by: INTERNAL MEDICINE

## 2019-03-15 PROCEDURE — 88305 TISSUE EXAM BY PATHOLOGIST: CPT | Performed by: INTERNAL MEDICINE

## 2019-03-18 LAB
CYTO UR: NORMAL
LAB AP CASE REPORT: NORMAL
LAB AP CLINICAL INFORMATION: NORMAL
PATH REPORT.FINAL DX SPEC: NORMAL
PATH REPORT.GROSS SPEC: NORMAL

## 2019-03-29 ENCOUNTER — LAB REQUISITION (OUTPATIENT)
Dept: LAB | Facility: HOSPITAL | Age: 29
End: 2019-03-29

## 2019-03-29 ENCOUNTER — OUTSIDE FACILITY SERVICE (OUTPATIENT)
Dept: GASTROENTEROLOGY | Facility: CLINIC | Age: 29
End: 2019-03-29

## 2019-03-29 DIAGNOSIS — Z12.11 ENCOUNTER FOR SCREENING FOR MALIGNANT NEOPLASM OF COLON: ICD-10-CM

## 2019-03-29 PROCEDURE — 45385 COLONOSCOPY W/LESION REMOVAL: CPT | Performed by: INTERNAL MEDICINE

## 2019-03-29 PROCEDURE — 88305 TISSUE EXAM BY PATHOLOGIST: CPT | Performed by: INTERNAL MEDICINE

## 2019-09-09 DIAGNOSIS — R12 HEARTBURN: ICD-10-CM

## 2019-09-09 RX ORDER — PANTOPRAZOLE SODIUM 40 MG/1
40 TABLET, DELAYED RELEASE ORAL
Qty: 30 TABLET | Refills: 5 | Status: SHIPPED | OUTPATIENT
Start: 2019-09-09 | End: 2019-12-13 | Stop reason: SDUPTHER

## 2019-12-13 ENCOUNTER — TELEPHONE (OUTPATIENT)
Dept: UROLOGY | Facility: CLINIC | Age: 29
End: 2019-12-13

## 2019-12-13 DIAGNOSIS — R12 HEARTBURN: ICD-10-CM

## 2019-12-13 RX ORDER — PANTOPRAZOLE SODIUM 40 MG/1
40 TABLET, DELAYED RELEASE ORAL
Qty: 30 TABLET | Refills: 11 | Status: SHIPPED | OUTPATIENT
Start: 2019-12-13 | End: 2019-12-13

## 2019-12-13 RX ORDER — PANTOPRAZOLE SODIUM 40 MG/1
40 TABLET, DELAYED RELEASE ORAL
Qty: 30 TABLET | Refills: 11 | Status: SHIPPED | OUTPATIENT
Start: 2019-12-13 | End: 2021-03-01 | Stop reason: SDUPTHER

## 2019-12-13 NOTE — TELEPHONE ENCOUNTER
[facility], I sent again to [facility]

## 2019-12-13 NOTE — TELEPHONE ENCOUNTER
Patient stated that her pharmacy did not have refills on the protonix.  She would like to have a refill called in to the [facility] in [facility].

## 2020-03-12 ENCOUNTER — LAB (OUTPATIENT)
Dept: LAB | Facility: HOSPITAL | Age: 30
End: 2020-03-12

## 2020-03-12 ENCOUNTER — OFFICE VISIT (OUTPATIENT)
Dept: SURGERY | Facility: CLINIC | Age: 30
End: 2020-03-12

## 2020-03-12 VITALS — HEIGHT: 66 IN | WEIGHT: 235 LBS | BODY MASS INDEX: 37.77 KG/M2

## 2020-03-12 DIAGNOSIS — K82.8 DYSFUNCTIONAL GALLBLADDER: ICD-10-CM

## 2020-03-12 DIAGNOSIS — R14.0 BLOATING: Primary | ICD-10-CM

## 2020-03-12 PROCEDURE — 99203 OFFICE O/P NEW LOW 30 MIN: CPT | Performed by: SURGERY

## 2020-03-12 PROCEDURE — 80053 COMPREHEN METABOLIC PANEL: CPT

## 2020-03-12 PROCEDURE — 36415 COLL VENOUS BLD VENIPUNCTURE: CPT

## 2020-03-12 NOTE — PROGRESS NOTES
Subjective   Urszula Gonzalez is a 29 y.o. female.     Chief Complaint: Abdominal pain and bloating    History of Present Illness She has had problems with some nausea and pain at times over the last few years. 2 years ago she had a HIDA EF of 38% with symptoms. Her LFTs were normal then. The a few days ago she was seen in another clinic. and was told her LFTs were markedly elevated. She has not had repeat xrays. She had no known history of hepatitis and had no stones on her US two years ago.     The following portions of the patient's history were reviewed and updated as appropriate: current medications, past family history, past medical history, past social history, past surgical history and problem list.    Review of Systems   Constitutional: Negative for activity change, appetite change, chills, fever and unexpected weight change.   HENT: Negative for congestion, facial swelling and sore throat.    Eyes: Negative for photophobia and visual disturbance.   Respiratory: Negative for chest tightness, shortness of breath and wheezing.    Cardiovascular: Negative for chest pain, palpitations and leg swelling.   Gastrointestinal: Positive for abdominal pain, nausea and vomiting. Negative for abdominal distention, anal bleeding, blood in stool, constipation, diarrhea and rectal pain.   Endocrine: Negative for cold intolerance, heat intolerance, polydipsia and polyuria.   Genitourinary: Negative for difficulty urinating, dysuria, flank pain and urgency.   Musculoskeletal: Negative for back pain and myalgias.   Skin: Negative for rash and wound.   Allergic/Immunologic: Negative for immunocompromised state.   Neurological: Negative for dizziness, seizures, syncope, light-headedness, numbness and headaches.   Hematological: Negative for adenopathy. Does not bruise/bleed easily.   Psychiatric/Behavioral: Negative for behavioral problems and confusion. The patient is not nervous/anxious.        Objective   Physical Exam    Constitutional: She is oriented to person, place, and time. She appears well-developed and well-nourished. She does not appear ill. No distress.   HENT:   Head: Normocephalic. Head is without laceration. Hair is normal.   Right Ear: Hearing and ear canal normal.   Left Ear: Hearing and ear canal normal.   Nose: Nose normal. No sinus tenderness. No epistaxis. Right sinus exhibits no maxillary sinus tenderness and no frontal sinus tenderness. Left sinus exhibits no maxillary sinus tenderness and no frontal sinus tenderness.   Eyes: Pupils are equal, round, and reactive to light. Conjunctivae and lids are normal.   Neck: Normal range of motion. No JVD present. No tracheal tenderness present. No tracheal deviation present. No thyroid mass and no thyromegaly present.   Cardiovascular: Normal rate and regular rhythm. Exam reveals no gallop.   No murmur heard.  Pulmonary/Chest: Effort normal and breath sounds normal. No stridor. She has no wheezes. She exhibits no tenderness.   Abdominal: Soft. Bowel sounds are normal. She exhibits no distension, no ascites and no mass. There is tenderness. There is no rebound and no guarding. No hernia.   Musculoskeletal: She exhibits no edema or deformity.   Lymphadenopathy:     She has no cervical adenopathy.     She has no axillary adenopathy.        Right: No inguinal and no supraclavicular adenopathy present.        Left: No inguinal and no supraclavicular adenopathy present.   Neurological: She is alert and oriented to person, place, and time. She exhibits normal muscle tone.   Skin: Skin is warm, dry and intact. No rash noted. No erythema. No pallor.   Psychiatric: She has a normal mood and affect. Her behavior is normal. Thought content normal.   Vitals reviewed.      Assessment/Plan   Urszula was seen today for cholelithiasis.    Diagnoses and all orders for this visit:    Bloating    Dysfunctional gallbladder    It is likely her gallbladder causing the symptoms and she would  benefit from a Margarita harry. I have still not received the labs done before so her CMP will be rechecked here.

## 2020-03-13 ENCOUNTER — ANESTHESIA (OUTPATIENT)
Dept: PERIOP | Facility: HOSPITAL | Age: 30
End: 2020-03-13

## 2020-03-13 ENCOUNTER — ANESTHESIA EVENT (OUTPATIENT)
Dept: PERIOP | Facility: HOSPITAL | Age: 30
End: 2020-03-13

## 2020-03-13 ENCOUNTER — HOSPITAL ENCOUNTER (OUTPATIENT)
Facility: HOSPITAL | Age: 30
Setting detail: HOSPITAL OUTPATIENT SURGERY
Discharge: HOME | End: 2020-03-13
Attending: SURGERY | Admitting: SURGERY

## 2020-03-13 VITALS
SYSTOLIC BLOOD PRESSURE: 138 MMHG | HEART RATE: 55 BPM | OXYGEN SATURATION: 100 % | BODY MASS INDEX: 38.33 KG/M2 | DIASTOLIC BLOOD PRESSURE: 85 MMHG | WEIGHT: 238.5 LBS | TEMPERATURE: 98.1 F | HEIGHT: 66 IN | RESPIRATION RATE: 16 BRPM

## 2020-03-13 DIAGNOSIS — K82.8 DYSFUNCTIONAL GALLBLADDER: ICD-10-CM

## 2020-03-13 DIAGNOSIS — R14.0 BLOATING: ICD-10-CM

## 2020-03-13 LAB
ALBUMIN SERPL-MCNC: 4.2 G/DL (ref 3.5–5.2)
ALBUMIN/GLOB SERPL: 1.5 G/DL
ALP SERPL-CCNC: 70 U/L (ref 39–117)
ALT SERPL W P-5'-P-CCNC: 12 U/L (ref 1–33)
ANION GAP SERPL CALCULATED.3IONS-SCNC: 14 MMOL/L (ref 5–15)
AST SERPL-CCNC: 16 U/L (ref 1–32)
BILIRUB SERPL-MCNC: 0.2 MG/DL (ref 0.2–1.2)
BUN BLD-MCNC: 10 MG/DL (ref 6–20)
BUN/CREAT SERPL: 11.4 (ref 7–25)
CALCIUM SPEC-SCNC: 9 MG/DL (ref 8.6–10.5)
CHLORIDE SERPL-SCNC: 106 MMOL/L (ref 98–107)
CO2 SERPL-SCNC: 21 MMOL/L (ref 22–29)
CREAT BLD-MCNC: 0.88 MG/DL (ref 0.57–1)
GFR SERPL CREATININE-BSD FRML MDRD: 77 ML/MIN/1.73
GLOBULIN UR ELPH-MCNC: 2.8 GM/DL
GLUCOSE BLD-MCNC: 84 MG/DL (ref 65–99)
POTASSIUM BLD-SCNC: 3.8 MMOL/L (ref 3.5–5.2)
PROT SERPL-MCNC: 7 G/DL (ref 6–8.5)
SODIUM BLD-SCNC: 141 MMOL/L (ref 136–145)

## 2020-03-13 PROCEDURE — 25010000002 DEXAMETHASONE PER 1 MG: Performed by: NURSE ANESTHETIST, CERTIFIED REGISTERED

## 2020-03-13 PROCEDURE — 25010000002 NEOSTIGMINE 10 MG/10ML SOLUTION: Performed by: NURSE ANESTHETIST, CERTIFIED REGISTERED

## 2020-03-13 PROCEDURE — 25010000002 ONDANSETRON PER 1 MG: Performed by: NURSE ANESTHETIST, CERTIFIED REGISTERED

## 2020-03-13 PROCEDURE — 25010000002 MIDAZOLAM PER 1 MG: Performed by: ANESTHESIOLOGY

## 2020-03-13 PROCEDURE — 25010000002 FENTANYL CITRATE (PF) 100 MCG/2ML SOLUTION: Performed by: NURSE ANESTHETIST, CERTIFIED REGISTERED

## 2020-03-13 PROCEDURE — 25010000003 MEPERIDINE PER 100 MG: Performed by: NURSE ANESTHETIST, CERTIFIED REGISTERED

## 2020-03-13 PROCEDURE — 47562 LAPAROSCOPIC CHOLECYSTECTOMY: CPT | Performed by: SURGERY

## 2020-03-13 PROCEDURE — 25010000002 KETOROLAC TROMETHAMINE PER 15 MG: Performed by: NURSE ANESTHETIST, CERTIFIED REGISTERED

## 2020-03-13 PROCEDURE — 25010000002 PROPOFOL 10 MG/ML EMULSION: Performed by: NURSE ANESTHETIST, CERTIFIED REGISTERED

## 2020-03-13 RX ORDER — ONDANSETRON 2 MG/ML
INJECTION INTRAMUSCULAR; INTRAVENOUS AS NEEDED
Status: DISCONTINUED | OUTPATIENT
Start: 2020-03-13 | End: 2020-03-13 | Stop reason: SURG

## 2020-03-13 RX ORDER — GLYCOPYRROLATE 0.2 MG/ML
INJECTION INTRAMUSCULAR; INTRAVENOUS AS NEEDED
Status: DISCONTINUED | OUTPATIENT
Start: 2020-03-13 | End: 2020-03-13 | Stop reason: SURG

## 2020-03-13 RX ORDER — SODIUM CHLORIDE 0.9 % (FLUSH) 0.9 %
10 SYRINGE (ML) INJECTION AS NEEDED
Status: DISCONTINUED | OUTPATIENT
Start: 2020-03-13 | End: 2020-03-13 | Stop reason: HOSPADM

## 2020-03-13 RX ORDER — SODIUM CHLORIDE, SODIUM LACTATE, POTASSIUM CHLORIDE, CALCIUM CHLORIDE 600; 310; 30; 20 MG/100ML; MG/100ML; MG/100ML; MG/100ML
125 INJECTION, SOLUTION INTRAVENOUS CONTINUOUS
Status: DISCONTINUED | OUTPATIENT
Start: 2020-03-13 | End: 2020-03-13 | Stop reason: HOSPADM

## 2020-03-13 RX ORDER — ROCURONIUM BROMIDE 10 MG/ML
INJECTION, SOLUTION INTRAVENOUS AS NEEDED
Status: DISCONTINUED | OUTPATIENT
Start: 2020-03-13 | End: 2020-03-13 | Stop reason: SURG

## 2020-03-13 RX ORDER — IPRATROPIUM BROMIDE AND ALBUTEROL SULFATE 2.5; .5 MG/3ML; MG/3ML
3 SOLUTION RESPIRATORY (INHALATION) ONCE AS NEEDED
Status: DISCONTINUED | OUTPATIENT
Start: 2020-03-13 | End: 2020-03-13 | Stop reason: HOSPADM

## 2020-03-13 RX ORDER — SODIUM CHLORIDE 0.9 % (FLUSH) 0.9 %
10 SYRINGE (ML) INJECTION EVERY 12 HOURS SCHEDULED
Status: DISCONTINUED | OUTPATIENT
Start: 2020-03-13 | End: 2020-03-13 | Stop reason: HOSPADM

## 2020-03-13 RX ORDER — NEOSTIGMINE METHYLSULFATE 1 MG/ML
INJECTION, SOLUTION INTRAVENOUS AS NEEDED
Status: DISCONTINUED | OUTPATIENT
Start: 2020-03-13 | End: 2020-03-13 | Stop reason: SURG

## 2020-03-13 RX ORDER — LIDOCAINE HYDROCHLORIDE 20 MG/ML
INJECTION, SOLUTION EPIDURAL; INFILTRATION; INTRACAUDAL; PERINEURAL AS NEEDED
Status: DISCONTINUED | OUTPATIENT
Start: 2020-03-13 | End: 2020-03-13 | Stop reason: SURG

## 2020-03-13 RX ORDER — FAMOTIDINE 10 MG/ML
INJECTION, SOLUTION INTRAVENOUS AS NEEDED
Status: DISCONTINUED | OUTPATIENT
Start: 2020-03-13 | End: 2020-03-13 | Stop reason: SURG

## 2020-03-13 RX ORDER — MAGNESIUM HYDROXIDE 1200 MG/15ML
LIQUID ORAL AS NEEDED
Status: DISCONTINUED | OUTPATIENT
Start: 2020-03-13 | End: 2020-03-13 | Stop reason: HOSPADM

## 2020-03-13 RX ORDER — ONDANSETRON 2 MG/ML
4 INJECTION INTRAMUSCULAR; INTRAVENOUS AS NEEDED
Status: DISCONTINUED | OUTPATIENT
Start: 2020-03-13 | End: 2020-03-13 | Stop reason: HOSPADM

## 2020-03-13 RX ORDER — MIDAZOLAM HYDROCHLORIDE 1 MG/ML
1 INJECTION INTRAMUSCULAR; INTRAVENOUS
Status: DISCONTINUED | OUTPATIENT
Start: 2020-03-13 | End: 2020-03-13 | Stop reason: HOSPADM

## 2020-03-13 RX ORDER — PROPOFOL 10 MG/ML
VIAL (ML) INTRAVENOUS AS NEEDED
Status: DISCONTINUED | OUTPATIENT
Start: 2020-03-13 | End: 2020-03-13 | Stop reason: SURG

## 2020-03-13 RX ORDER — SODIUM CHLORIDE 9 MG/ML
INJECTION, SOLUTION INTRAVENOUS AS NEEDED
Status: DISCONTINUED | OUTPATIENT
Start: 2020-03-13 | End: 2020-03-13 | Stop reason: HOSPADM

## 2020-03-13 RX ORDER — KETOROLAC TROMETHAMINE 30 MG/ML
INJECTION, SOLUTION INTRAMUSCULAR; INTRAVENOUS AS NEEDED
Status: DISCONTINUED | OUTPATIENT
Start: 2020-03-13 | End: 2020-03-13 | Stop reason: SURG

## 2020-03-13 RX ORDER — HYDROCODONE BITARTRATE AND ACETAMINOPHEN 5; 325 MG/1; MG/1
1 TABLET ORAL EVERY 4 HOURS PRN
Qty: 15 TABLET | Refills: 0 | Status: SHIPPED | OUTPATIENT
Start: 2020-03-13 | End: 2020-03-23

## 2020-03-13 RX ORDER — FENTANYL CITRATE 50 UG/ML
INJECTION, SOLUTION INTRAMUSCULAR; INTRAVENOUS AS NEEDED
Status: DISCONTINUED | OUTPATIENT
Start: 2020-03-13 | End: 2020-03-13 | Stop reason: SURG

## 2020-03-13 RX ORDER — HYDROCODONE BITARTRATE AND ACETAMINOPHEN 5; 325 MG/1; MG/1
1 TABLET ORAL EVERY 4 HOURS PRN
Status: DISCONTINUED | OUTPATIENT
Start: 2020-03-13 | End: 2020-03-13 | Stop reason: HOSPADM

## 2020-03-13 RX ORDER — BUPIVACAINE HYDROCHLORIDE AND EPINEPHRINE 5; 5 MG/ML; UG/ML
INJECTION, SOLUTION PERINEURAL AS NEEDED
Status: DISCONTINUED | OUTPATIENT
Start: 2020-03-13 | End: 2020-03-13 | Stop reason: HOSPADM

## 2020-03-13 RX ORDER — MEPERIDINE HYDROCHLORIDE 25 MG/ML
12.5 INJECTION INTRAMUSCULAR; INTRAVENOUS; SUBCUTANEOUS
Status: DISCONTINUED | OUTPATIENT
Start: 2020-03-13 | End: 2020-03-13 | Stop reason: HOSPADM

## 2020-03-13 RX ORDER — KETOROLAC TROMETHAMINE 10 MG/1
10 TABLET, FILM COATED ORAL EVERY 8 HOURS PRN
COMMUNITY
End: 2021-02-01

## 2020-03-13 RX ORDER — METOPROLOL TARTRATE 5 MG/5ML
INJECTION INTRAVENOUS AS NEEDED
Status: DISCONTINUED | OUTPATIENT
Start: 2020-03-13 | End: 2020-03-13 | Stop reason: SURG

## 2020-03-13 RX ORDER — DEXAMETHASONE SODIUM PHOSPHATE 4 MG/ML
INJECTION, SOLUTION INTRA-ARTICULAR; INTRALESIONAL; INTRAMUSCULAR; INTRAVENOUS; SOFT TISSUE AS NEEDED
Status: DISCONTINUED | OUTPATIENT
Start: 2020-03-13 | End: 2020-03-13 | Stop reason: SURG

## 2020-03-13 RX ORDER — FENTANYL CITRATE 50 UG/ML
50 INJECTION, SOLUTION INTRAMUSCULAR; INTRAVENOUS
Status: DISCONTINUED | OUTPATIENT
Start: 2020-03-13 | End: 2020-03-13 | Stop reason: HOSPADM

## 2020-03-13 RX ORDER — OXYCODONE HYDROCHLORIDE AND ACETAMINOPHEN 5; 325 MG/1; MG/1
1 TABLET ORAL ONCE AS NEEDED
Status: DISCONTINUED | OUTPATIENT
Start: 2020-03-13 | End: 2020-03-13 | Stop reason: HOSPADM

## 2020-03-13 RX ORDER — MIDAZOLAM HYDROCHLORIDE 1 MG/ML
2 INJECTION INTRAMUSCULAR; INTRAVENOUS
Status: DISCONTINUED | OUTPATIENT
Start: 2020-03-13 | End: 2020-03-13 | Stop reason: HOSPADM

## 2020-03-13 RX ADMIN — SODIUM CHLORIDE, POTASSIUM CHLORIDE, SODIUM LACTATE AND CALCIUM CHLORIDE 125 ML/HR: 600; 310; 30; 20 INJECTION, SOLUTION INTRAVENOUS at 09:53

## 2020-03-13 RX ADMIN — KETOROLAC TROMETHAMINE 30 MG: 30 INJECTION, SOLUTION INTRAMUSCULAR; INTRAVENOUS at 11:02

## 2020-03-13 RX ADMIN — MIDAZOLAM HYDROCHLORIDE 2 MG: 1 INJECTION, SOLUTION INTRAMUSCULAR; INTRAVENOUS at 10:27

## 2020-03-13 RX ADMIN — NEOSTIGMINE METHYLSULFATE 3 MG: 1 INJECTION, SOLUTION INTRAVENOUS at 10:48

## 2020-03-13 RX ADMIN — ROCURONIUM BROMIDE 25 MG: 10 SOLUTION INTRAVENOUS at 10:30

## 2020-03-13 RX ADMIN — LIDOCAINE HYDROCHLORIDE 60 MG: 20 INJECTION, SOLUTION EPIDURAL; INFILTRATION; INTRACAUDAL; PERINEURAL at 10:30

## 2020-03-13 RX ADMIN — GLYCOPYRROLATE 0.4 MG: 0.4 INJECTION INTRAMUSCULAR; INTRAVENOUS at 10:48

## 2020-03-13 RX ADMIN — FAMOTIDINE 20 MG: 10 INJECTION INTRAVENOUS at 10:27

## 2020-03-13 RX ADMIN — DEXAMETHASONE SODIUM PHOSPHATE 8 MG: 4 INJECTION, SOLUTION INTRAMUSCULAR; INTRAVENOUS at 10:49

## 2020-03-13 RX ADMIN — MEPERIDINE HYDROCHLORIDE 12.5 MG: 25 INJECTION INTRAMUSCULAR; INTRAVENOUS; SUBCUTANEOUS at 11:20

## 2020-03-13 RX ADMIN — PROPOFOL 150 MG: 10 INJECTION, EMULSION INTRAVENOUS at 10:30

## 2020-03-13 RX ADMIN — FENTANYL CITRATE 50 MCG: 50 INJECTION INTRAMUSCULAR; INTRAVENOUS at 11:06

## 2020-03-13 RX ADMIN — FENTANYL CITRATE 50 MCG: 50 INJECTION INTRAMUSCULAR; INTRAVENOUS at 11:00

## 2020-03-13 RX ADMIN — ONDANSETRON 4 MG: 2 INJECTION INTRAMUSCULAR; INTRAVENOUS at 10:40

## 2020-03-13 RX ADMIN — PROPOFOL 50 MG: 10 INJECTION, EMULSION INTRAVENOUS at 10:39

## 2020-03-13 RX ADMIN — FENTANYL CITRATE 50 MCG: 50 INJECTION INTRAMUSCULAR; INTRAVENOUS at 10:34

## 2020-03-13 RX ADMIN — METOPROLOL TARTRATE 2.5 MG: 5 INJECTION, SOLUTION INTRAVENOUS at 10:51

## 2020-03-13 RX ADMIN — FENTANYL CITRATE 50 MCG: 50 INJECTION INTRAMUSCULAR; INTRAVENOUS at 10:30

## 2020-03-13 NOTE — ANESTHESIA POSTPROCEDURE EVALUATION
Patient: Urszula Gonzalez    Procedure Summary     Date:  03/13/20 Room / Location:  [facility] OR 01 /  [facility] OR    Anesthesia Start:  1027 Anesthesia Stop:  1056    Procedure:  CHOLECYSTECTOMY LAPAROSCOPIC (N/A Abdomen) Diagnosis:       Bloating      Dysfunctional gallbladder      (Bloating [R14.0])      (Dysfunctional gallbladder [K82.8])    Surgeon:  Toby Murphy MD Provider:  Jamar Trejo MD    Anesthesia Type:  general ASA Status:  2          Anesthesia Type: general    Vitals  Vitals Value Taken Time   /91 3/13/2020 11:27 AM   Temp 97.1 °F (36.2 °C) 3/13/2020 10:57 AM   Pulse 51 3/13/2020 11:27 AM   Resp 19 3/13/2020 11:27 AM   SpO2 99 % 3/13/2020 11:27 AM           Post Anesthesia Care and Evaluation    Patient location during evaluation: PHASE II  Patient participation: complete - patient participated  Level of consciousness: awake and alert  Pain score: 1  Pain management: adequate  Airway patency: patent  Anesthetic complications: No anesthetic complications  PONV Status: controlled  Cardiovascular status: acceptable  Respiratory status: acceptable  Hydration status: acceptable

## 2020-03-13 NOTE — ANESTHESIA PREPROCEDURE EVALUATION
Anesthesia Evaluation     no history of anesthetic complications:  NPO Solid Status: > 8 hours  NPO Liquid Status: > 8 hours           Airway   Mallampati: II  TM distance: >3 FB  Neck ROM: full  No difficulty expected  Dental - normal exam   (+) poor dentition    Pulmonary - normal exam   Cardiovascular - normal exam        Neuro/Psych  GI/Hepatic/Renal/Endo    (+)  GERD,      Musculoskeletal     Abdominal  - normal exam    Bowel sounds: normal.   Substance History      OB/GYN          Other                        Anesthesia Plan    ASA 2     general     intravenous induction     Anesthetic plan, all risks, benefits, and alternatives have been provided, discussed and informed consent has been obtained with: patient.

## 2020-03-13 NOTE — ANESTHESIA PROCEDURE NOTES
Airway  Urgency: elective    Date/Time: 3/13/2020 10:33 AM  Airway not difficult    General Information and Staff    Patient location during procedure: OR    Indications and Patient Condition  Indications for airway management: airway protection    Preoxygenated: yes  Mask difficulty assessment: 1 - vent by mask    Final Airway Details  Final airway type: endotracheal airway      Successful airway: ETT  Cuffed: yes   Successful intubation technique: direct laryngoscopy  Facilitating devices/methods: intubating stylet  Endotracheal tube insertion site: oral  Blade: [place]  Blade size: 3  ETT size (mm): 7.0  Cormack-Lehane Classification: grade I - full view of glottis  Placement verified by: chest auscultation, capnometry and palpation of cuff   Measured from: lips  ETT/EBT  to lips (cm): 21  Number of attempts at approach: 1  Assessment: lips, teeth, and gum same as pre-op and atraumatic intubation

## 2020-03-13 NOTE — OP NOTE
CHOLECYSTECTOMY LAPAROSCOPIC  Procedure Note    Urszula Gonzalez  3/13/2020    Pre-op Diagnosis:   Bloating [R14.0]  Dysfunctional gallbladder [K82.8]    Post-op Diagnosis: same , fatty liver       Procedure(s):  CHOLECYSTECTOMY LAPAROSCOPIC    Surgeon(s):  Toby Murphy MD    Anesthesia: Anesthesia type not filed in the log.    Staff:   Circulator: Morelia eWtzel RN  Scrub Person: Yusra Ramos  Assistant: Toby Guadarrama    Estimated Blood Loss: minimal    Specimens:                Order Name Source Comment Collection Info Order Time   SURGICAL PATHOLOGY EXAM Gallbladder  Collected By: Toby Murphy MD 3/13/2020 10:41 AM     Collection Date   3/13/2020          Collection Time   10:41 AM              Drains: * No LDAs found *    Procedure: The abdomen was prepped and draped. Two 5 mm and one 11 mm port was placed. The liver was enlarged and fatty. The cystic duct was dissected out, clipped and divided. The gallbladder was taken off the liver with cautery and brought out the upper midline port. The area was irrigated and suctioned. The gas was allowed to escape and ports closed with vicryl.     Findings:  Fatty liver    Complications:  none   Grafts / Implants N/A    Toby Murphy MD     Date: 3/13/2020  Time: 10:51

## 2020-03-16 LAB
LAB AP CASE REPORT: NORMAL
PATH REPORT.FINAL DX SPEC: NORMAL

## 2020-10-06 DIAGNOSIS — Z36.89 ENCOUNTER TO ESTABLISH GESTATIONAL AGE USING ULTRASOUND: Primary | ICD-10-CM

## 2020-10-09 ENCOUNTER — RESULTS ENCOUNTER (OUTPATIENT)
Dept: OBSTETRICS AND GYNECOLOGY | Facility: CLINIC | Age: 30
End: 2020-10-09

## 2020-10-09 ENCOUNTER — INITIAL PRENATAL (OUTPATIENT)
Dept: OBSTETRICS AND GYNECOLOGY | Facility: CLINIC | Age: 30
End: 2020-10-09

## 2020-10-09 ENCOUNTER — LAB (OUTPATIENT)
Dept: LAB | Facility: HOSPITAL | Age: 30
End: 2020-10-09

## 2020-10-09 ENCOUNTER — LAB REQUISITION (OUTPATIENT)
Dept: LAB | Facility: HOSPITAL | Age: 30
End: 2020-10-09

## 2020-10-09 VITALS — DIASTOLIC BLOOD PRESSURE: 76 MMHG | BODY MASS INDEX: 40.51 KG/M2 | SYSTOLIC BLOOD PRESSURE: 120 MMHG | WEIGHT: 251 LBS

## 2020-10-09 DIAGNOSIS — Z34.80 SUPERVISION OF OTHER NORMAL PREGNANCY: ICD-10-CM

## 2020-10-09 DIAGNOSIS — Z00.00 ROUTINE GENERAL MEDICAL EXAMINATION AT A HEALTH CARE FACILITY: ICD-10-CM

## 2020-10-09 DIAGNOSIS — O09.40 ENCOUNTER FOR SUPERVISION OF HIGH RISK PREGNANCY WITH GRAND MULTIPARITY, ANTEPARTUM: ICD-10-CM

## 2020-10-09 DIAGNOSIS — Z98.891 HISTORY OF 2 CESAREAN SECTIONS: ICD-10-CM

## 2020-10-09 DIAGNOSIS — Z3A.09 9 WEEKS GESTATION OF PREGNANCY: ICD-10-CM

## 2020-10-09 DIAGNOSIS — Z98.84 HISTORY OF BARIATRIC SURGERY: ICD-10-CM

## 2020-10-09 DIAGNOSIS — Z34.80 SUPERVISION OF OTHER NORMAL PREGNANCY: Primary | ICD-10-CM

## 2020-10-09 PROBLEM — F33.0 MILD EPISODE OF RECURRENT MAJOR DEPRESSIVE DISORDER (HCC): Status: ACTIVE | Noted: 2020-10-09

## 2020-10-09 LAB
ABO GROUP BLD: NORMAL
BASOPHILS # BLD AUTO: 0.04 10*3/MM3 (ref 0–0.2)
BASOPHILS NFR BLD AUTO: 0.4 % (ref 0–1.5)
BILIRUB UR QL STRIP: NEGATIVE
BLD GP AB SCN SERPL QL: NEGATIVE
CLARITY UR: CLEAR
COLOR UR: YELLOW
DEPRECATED RDW RBC AUTO: 41.5 FL (ref 37–54)
EOSINOPHIL # BLD AUTO: 0.03 10*3/MM3 (ref 0–0.4)
EOSINOPHIL NFR BLD AUTO: 0.3 % (ref 0.3–6.2)
ERYTHROCYTE [DISTWIDTH] IN BLOOD BY AUTOMATED COUNT: 14.5 % (ref 12.3–15.4)
GLUCOSE UR STRIP-MCNC: NEGATIVE MG/DL
HBA1C MFR BLD: 5.83 % (ref 4.8–5.6)
HBV SURFACE AG SERPL QL IA: NORMAL
HCT VFR BLD AUTO: 39.8 % (ref 34–46.6)
HCV AB SER DONR QL: NORMAL
HGB BLD-MCNC: 13.1 G/DL (ref 12–15.9)
HGB UR QL STRIP.AUTO: NEGATIVE
HIV1+2 AB SER QL: NORMAL
IMM GRANULOCYTES # BLD AUTO: 0.03 10*3/MM3 (ref 0–0.05)
IMM GRANULOCYTES NFR BLD AUTO: 0.3 % (ref 0–0.5)
KETONES UR QL STRIP: NEGATIVE
LEUKOCYTE ESTERASE UR QL STRIP.AUTO: NEGATIVE
LYMPHOCYTES # BLD AUTO: 3.07 10*3/MM3 (ref 0.7–3.1)
LYMPHOCYTES NFR BLD AUTO: 31.5 % (ref 19.6–45.3)
MCH RBC QN AUTO: 26.4 PG (ref 26.6–33)
MCHC RBC AUTO-ENTMCNC: 32.9 G/DL (ref 31.5–35.7)
MCV RBC AUTO: 80.1 FL (ref 79–97)
MONOCYTES # BLD AUTO: 0.84 10*3/MM3 (ref 0.1–0.9)
MONOCYTES NFR BLD AUTO: 8.6 % (ref 5–12)
NEUTROPHILS NFR BLD AUTO: 5.75 10*3/MM3 (ref 1.7–7)
NEUTROPHILS NFR BLD AUTO: 58.9 % (ref 42.7–76)
NITRITE UR QL STRIP: NEGATIVE
NRBC BLD AUTO-RTO: 0 /100 WBC (ref 0–0.2)
PH UR STRIP.AUTO: 5.5 [PH] (ref 5–8)
PLATELET # BLD AUTO: 286 10*3/MM3 (ref 140–450)
PMV BLD AUTO: 11.1 FL (ref 6–12)
PROT UR QL STRIP: NEGATIVE
RBC # BLD AUTO: 4.97 10*6/MM3 (ref 3.77–5.28)
RH BLD: POSITIVE
SP GR UR STRIP: 1.02 (ref 1–1.03)
TSH SERPL DL<=0.05 MIU/L-ACNC: 1.95 UIU/ML (ref 0.27–4.2)
UROBILINOGEN UR QL STRIP: NORMAL
WBC # BLD AUTO: 9.76 10*3/MM3 (ref 3.4–10.8)

## 2020-10-09 PROCEDURE — 86592 SYPHILIS TEST NON-TREP QUAL: CPT

## 2020-10-09 PROCEDURE — 86901 BLOOD TYPING SEROLOGIC RH(D): CPT

## 2020-10-09 PROCEDURE — 85025 COMPLETE CBC W/AUTO DIFF WBC: CPT

## 2020-10-09 PROCEDURE — 99204 OFFICE O/P NEW MOD 45 MIN: CPT | Performed by: OBSTETRICS & GYNECOLOGY

## 2020-10-09 PROCEDURE — 86900 BLOOD TYPING SEROLOGIC ABO: CPT

## 2020-10-09 PROCEDURE — 86850 RBC ANTIBODY SCREEN: CPT

## 2020-10-09 PROCEDURE — 87340 HEPATITIS B SURFACE AG IA: CPT

## 2020-10-09 PROCEDURE — 83036 HEMOGLOBIN GLYCOSYLATED A1C: CPT

## 2020-10-09 PROCEDURE — 87086 URINE CULTURE/COLONY COUNT: CPT

## 2020-10-09 PROCEDURE — 84443 ASSAY THYROID STIM HORMONE: CPT | Performed by: OBSTETRICS & GYNECOLOGY

## 2020-10-09 PROCEDURE — 80081 OBSTETRIC PANEL INC HIV TSTG: CPT

## 2020-10-09 PROCEDURE — G0432 EIA HIV-1/HIV-2 SCREEN: HCPCS

## 2020-10-09 PROCEDURE — 36415 COLL VENOUS BLD VENIPUNCTURE: CPT

## 2020-10-09 PROCEDURE — 81003 URINALYSIS AUTO W/O SCOPE: CPT

## 2020-10-09 PROCEDURE — 86803 HEPATITIS C AB TEST: CPT

## 2020-10-09 RX ORDER — FLUOXETINE HYDROCHLORIDE 20 MG/1
20 CAPSULE ORAL DAILY
Qty: 30 CAPSULE | Refills: 5 | Status: SHIPPED | OUTPATIENT
Start: 2020-10-09 | End: 2020-10-21 | Stop reason: SDUPTHER

## 2020-10-09 NOTE — PROGRESS NOTES
Subjective     Chief Complaint   Patient presents with   • Initial Prenatal Visit     NOB with questions regarding taking her prozac she discontinued once she found out she was pregnant.  She also wants to discuss her h-pylori       Urszula Gonzalez is a 30 y.o. year old .  Patient's last menstrual period was 2020..  She presents to be seen to initiate prenatal care with our practice.    She is currently having problems with severe GERD and recurrent Major Depressive Disorder, recurrent  As it relates to the pregnancy, she has concerns regarding conduct of pregnancy in the context of Several chronic health issues and 2 previous  deliveries    Hx of 2  Sections  Initial due to FTP  Elective Repeat  Will Require Repeat  Delivery  Monitor placentation    Super Obesity  Hx of Gastric Sleeve  Will need close monitoring of weight gain and nutritional status as well as fetal growth  Persistent severe GERD symptoms will re-start PPI    Major Depressive Disorder  Long-standing  Off meds  Recurrent  No SI/HI  Anxious, lost of interest. Hypersomnolence irritable  Stopped SSRI with pregnancy  Desires to re-start Prozac  Discussed risk benefits and agree with re-starting prozac  Warnings discussed patient agrees     The following portions of the patient's history were reviewed and updated as appropriate:vital signs, allergies, current medications, past medical history, past social history, past surgical history and problem list.    Social History     Social History Narrative   • Not on file        A 14 point review of systems was negative except for: Gastrointestinal: positive for reflux symptoms  Behavioral/Psych: positive for anxiety, bad mood, depression and obesity    Objective     Physical Exam    General:  obese - Body mass index is 40.51 kg/m².   Constitutional: obese and healthy   Skin:  No suspicious lesions seen   Thyroid: normal to inspection and palpation   Lungs:  breathing is  unlabored   Heart:  Not performed.   Breasts:  Not performed.   Abdomen: soft, non-tender; no masses  no umbilical or inginual hernias are present  no hepato-splenomegaly   Pelvis: Clinical staff was present for exam  External genitalia:  normal appearance of the external genitalia including Bartholin's and [place]'s glands.  :  urethral meatus normal; urethral hypermobility is absent.  Vaginal:  normal pink mucosa without prolapse or lesions.  Cervix:  normal appearance  Uterus:  symmetrically enlarged, consisent with 10 weeks size;  Adnexa:  normal bimanual exam of the adnexa.   Musculoskeletal: negative   Neuro: normal without focal findings, mental status, speech normal, alert and oriented x3 and reflexes normal and symmetric   Psych: oriented to time, place and person, mood and affect are within normal limits, pt is a good historian; no memory problems were noted       Lab Review   No data reviewed    Imaging   Pelvic ultrasound report    High Risk Pregnancy  ASSESSMENT  1. IUP at 9w4d  Problem List Items Addressed This Visit        Other    History of 2  sections    9 weeks gestation of pregnancy    Encounter for supervision of high risk pregnancy with grand multiparity, antepartum    History of bariatric surgery      Other Visit Diagnoses     Supervision of other normal pregnancy    -  Primary    Relevant Orders    Urinalysis With Culture If Indicated -    Gardnerella vaginalis, Trichomonas vaginalis, Candida albicans, DNA - Swab, Vagina    HIV-1 / O / 2 Ag / Antibody 4th Generation    Obstetric Panel    Pap IG, Rfx HPV ASCU    Pain Management Profile (13 Drugs) Urine - Urine, Clean Catch    Urine Culture - Urine, Urine, Clean Catch    Hemoglobin A1c    TSH    Non-invasive Prenatal Testing      2.     PLAN  1. Tests ordered today:  Orders Placed This Encounter   Procedures   • Gardnerella vaginalis, Trichomonas vaginalis, Candida albicans, DNA - Swab, Vagina     Standing Status:   Future     Standing  Expiration Date:   10/9/2021   • Urine Culture - Urine, Urine, Clean Catch     Standing Status:   Future   • Urinalysis With Culture If Indicated -     Standing Status:   Future     Standing Expiration Date:   10/9/2021   • HIV-1 / O / 2 Ag / Antibody 4th Generation     Standing Status:   Future     Standing Expiration Date:   10/9/2021   • Obstetric Panel     Standing Status:   Future     Standing Expiration Date:   10/9/2021   • Pain Management Profile (13 Drugs) Urine - Urine, Clean Catch     Standing Status:   Future     Standing Expiration Date:   10/9/2021   • Hemoglobin A1c     Standing Status:   Future     Standing Expiration Date:   10/9/2021   • TSH   • Non-invasive Prenatal Testing     Standing Status:   Future     Standing Expiration Date:   10/9/2021     2. Medications prescribed today:  New Medications Ordered This Visit   Medications   • FLUoxetine (PROzac) 20 MG capsule     Sig: Take 1 capsule by mouth Daily.     Dispense:  30 capsule     Refill:  5     3. Information reviewed: exercise in pregnancy, nutrition in pregnancy, weight gain in pregnancy, work and travel restrictions during pregnancy, list of OTC medications acceptable in pregnancy and call coverage groups  4. Genetic testing reviewed: NIPT (Panorama)  5. The problem list for pregnancy was initiated today  6.       Follow up: 3 week(s)         This note was electronically signed.    Vinayak Gutierrez MD  October 9, 2020

## 2020-10-10 LAB
HOLD SPECIMEN: NORMAL
RPR SER QL: NORMAL

## 2020-10-11 LAB
BACTERIA SPEC AEROBE CULT: ABNORMAL
RUBV IGG SERPL IA-ACNC: NORMAL

## 2020-10-15 DIAGNOSIS — Z34.80 SUPERVISION OF OTHER NORMAL PREGNANCY: ICD-10-CM

## 2020-10-20 DIAGNOSIS — Z34.80 SUPERVISION OF OTHER NORMAL PREGNANCY: ICD-10-CM

## 2020-10-21 ENCOUNTER — TELEPHONE (OUTPATIENT)
Dept: OBSTETRICS AND GYNECOLOGY | Facility: CLINIC | Age: 30
End: 2020-10-21

## 2020-10-21 RX ORDER — FLUOXETINE HYDROCHLORIDE 20 MG/1
20 CAPSULE ORAL DAILY
Qty: 30 CAPSULE | Refills: 5 | Status: SHIPPED | OUTPATIENT
Start: 2020-10-21 | End: 2021-04-24 | Stop reason: HOSPADM

## 2020-10-21 NOTE — TELEPHONE ENCOUNTER
Dr. Gutierrez's patient 11w2d   [phone] returned patient's call and I advised patient Dr. Gutierrez is out of the office until tomorrow. But I will let him know that she called requesting her results. Also patient states she went to the pharmacy to  her prescription for fluoxetine and was told the Rx has not been called in. After looking under the meds tab I informed patient that her prescription was sent to her pharmacy on 10/9/2020 and her pharmacy received it @ 11:46am. Patient is asking that her prescription be sent to the pharmacy again.

## 2020-10-22 ENCOUNTER — TELEPHONE (OUTPATIENT)
Dept: OBSTETRICS AND GYNECOLOGY | Facility: CLINIC | Age: 30
End: 2020-10-22

## 2020-10-22 NOTE — TELEPHONE ENCOUNTER
----- Message from Vinayak Gutierrez MD sent at 10/22/2020  2:49 PM EDT -----      ----- Message -----  From: Juliet Rivers RegSched Rep  Sent: 10/20/2020   1:41 PM EDT  To: Vinayak Gutierrez MD

## 2020-10-28 ENCOUNTER — ROUTINE PRENATAL (OUTPATIENT)
Dept: OBSTETRICS AND GYNECOLOGY | Facility: CLINIC | Age: 30
End: 2020-10-28

## 2020-10-28 ENCOUNTER — LAB REQUISITION (OUTPATIENT)
Dept: LAB | Facility: HOSPITAL | Age: 30
End: 2020-10-28

## 2020-10-28 VITALS — BODY MASS INDEX: 40.19 KG/M2 | SYSTOLIC BLOOD PRESSURE: 120 MMHG | WEIGHT: 249 LBS | DIASTOLIC BLOOD PRESSURE: 66 MMHG

## 2020-10-28 DIAGNOSIS — Z3A.12 12 WEEKS GESTATION OF PREGNANCY: ICD-10-CM

## 2020-10-28 DIAGNOSIS — Z34.81 PRENATAL CARE, SUBSEQUENT PREGNANCY, FIRST TRIMESTER: Primary | ICD-10-CM

## 2020-10-28 DIAGNOSIS — O09.40 ENCOUNTER FOR SUPERVISION OF HIGH RISK PREGNANCY WITH GRAND MULTIPARITY, ANTEPARTUM: ICD-10-CM

## 2020-10-28 DIAGNOSIS — Z00.00 ROUTINE GENERAL MEDICAL EXAMINATION AT A HEALTH CARE FACILITY: ICD-10-CM

## 2020-10-28 DIAGNOSIS — Z98.891 HISTORY OF 2 CESAREAN SECTIONS: ICD-10-CM

## 2020-10-28 PROCEDURE — 99213 OFFICE O/P EST LOW 20 MIN: CPT | Performed by: OBSTETRICS & GYNECOLOGY

## 2020-10-28 PROCEDURE — 36415 COLL VENOUS BLD VENIPUNCTURE: CPT

## 2020-10-28 NOTE — PROGRESS NOTES
Chief Complaint   Patient presents with   • Routine Prenatal Visit     ob visit redraw panorama       HPI: Urszula is a  currently at 12w2d who today reports the following:Headache - No ; Visual change - No ; Swelling in legs - No ; Nausea - No ; Constipation - No; Diarrhea - No ; Contractions - No ; Leaking fluid - No ; Vaginal bleeding -  No.      ROS: Constitutional: negative for fever, chills, activity change, appetite change, fatigue and unexpected weight change.  Respiratory: negative for cough and dyspnea on exertion  Vitals: See prenatal flowsheet     EXAM:  Abdomen: See physician component of prenatal flowsheet   Urine glucose/protein: See physician component of prenatal flowsheet   Pelvic: See physician component of prenatal flowsheet     Prenatal Labs  Lab Results   Component Value Date    HGB 13.1 10/09/2020    RUBELLAABIGG Equivocal 10/09/2020    HEPBSAG Non-Reactive 10/09/2020    ABO A 10/09/2020    RH Positive 10/09/2020    ABSCRN Negative 10/09/2020    HZX2QPB8 Non-Reactive 10/09/2020    HEPCVIRUSABY Non-Reactive 10/09/2020    URINECX >100,000 CFU/mL Lactobacillus species (A) 10/09/2020       MDM:  Impression:Multiparous patient with medical complications, Previous C/S with planned repeat C/S and Obesity  Tests done today: re-draw NIPT  Specific topics discussed at today's visit: Questions answered regarding COVID-19 and revision of office schedule of visits due to pandemic  Next visit:none

## 2020-11-10 ENCOUNTER — TELEPHONE (OUTPATIENT)
Dept: OBSTETRICS AND GYNECOLOGY | Facility: CLINIC | Age: 30
End: 2020-11-10

## 2020-11-10 NOTE — TELEPHONE ENCOUNTER
----- Message from Vinayak Gutierrez MD sent at 11/9/2020  6:14 PM EST -----      ----- Message -----  From: Ashlee Pineda, [facility] Rep  Sent: 11/6/2020   8:25 AM EST  To: Vinayak Gutierrez MD

## 2020-11-17 ENCOUNTER — TELEPHONE (OUTPATIENT)
Dept: OBSTETRICS AND GYNECOLOGY | Facility: CLINIC | Age: 30
End: 2020-11-17

## 2020-11-17 NOTE — TELEPHONE ENCOUNTER
Dr Gutierrez patient called in regards to having pain in her right arm and she went to her primary care doctor this morning and they didn't feel comfortable with doing anything for her due to her being pregnant. She said the pain is going from her neck down to her shoulder down to her hand. She is currently 15 weeks pregnant. She isn't sure if steroids, or inflammoratories  are safe to take and she wants to know what is safe for her to control the pain. Please call.

## 2020-11-17 NOTE — TELEPHONE ENCOUNTER
Patient states she is a  and has had neck shoulder and arm pain.  Pain worse went to PCP today was given cyclobenzaprine.  Patient was told she could use tens unit.  She was wondering if a steroid shot or cortisone shot could be given during pregnancy

## 2020-11-18 NOTE — TELEPHONE ENCOUNTER
Patient advised Dr Gutierrez not familiar with prescribing steroid injection therapy but can be used in pregnancy

## 2020-12-07 ENCOUNTER — ROUTINE PRENATAL (OUTPATIENT)
Dept: OBSTETRICS AND GYNECOLOGY | Facility: CLINIC | Age: 30
End: 2020-12-07

## 2020-12-07 VITALS — WEIGHT: 250 LBS | BODY MASS INDEX: 40.35 KG/M2 | SYSTOLIC BLOOD PRESSURE: 118 MMHG | DIASTOLIC BLOOD PRESSURE: 78 MMHG

## 2020-12-07 DIAGNOSIS — O09.40 ENCOUNTER FOR SUPERVISION OF HIGH RISK PREGNANCY WITH GRAND MULTIPARITY, ANTEPARTUM: ICD-10-CM

## 2020-12-07 DIAGNOSIS — Z98.891 HISTORY OF 2 CESAREAN SECTIONS: ICD-10-CM

## 2020-12-07 DIAGNOSIS — Z98.84 HISTORY OF BARIATRIC SURGERY: ICD-10-CM

## 2020-12-07 DIAGNOSIS — Z3A.18 18 WEEKS GESTATION OF PREGNANCY: Primary | ICD-10-CM

## 2020-12-07 PROCEDURE — 99213 OFFICE O/P EST LOW 20 MIN: CPT | Performed by: OBSTETRICS & GYNECOLOGY

## 2020-12-07 NOTE — PROGRESS NOTES
Chief Complaint   Patient presents with   • Routine Prenatal Visit     ob visit patient states her last pregnancy she was diagnosed with DOTSON and she is having symptoms again of seeing stars when standing her heart races and takes her breathe       HPI: Urszula is a  currently at 18w0d who today reports the following:Headache - No ; Visual change - No ; Swelling in legs - No ; Nausea - No ; Constipation - No; Diarrhea - No ; Contractions - No ; Leaking fluid - No ; Vaginal bleeding -  No.      ROS: Constitutional: negative for fever, chills, activity change, appetite change, fatigue and unexpected weight change.  Cardiovascular: positive for tachypnea  Neurological: positive for dizziness  Vitals: See prenatal flowsheet     EXAM:  Abdomen: See physician component of prenatal flowsheet   Urine glucose/protein: See physician component of prenatal flowsheet   Pelvic: See physician component of prenatal flowsheet     Prenatal Labs  Lab Results   Component Value Date    HGB 13.1 10/09/2020    RUBELLAABIGG Equivocal 10/09/2020    HEPBSAG Non-Reactive 10/09/2020    ABO A 10/09/2020    RH Positive 10/09/2020    ABSCRN Negative 10/09/2020    [id number] Non-Reactive 10/09/2020    HEPCVIRUSABY Non-Reactive 10/09/2020    URINECX >100,000 CFU/mL Lactobacillus species (A) 10/09/2020       MDM: High Risk Pregnancy  Impression:Multiparous patient with medical complications, Previous C/S with planned repeat C/S and Obesity  Tests done today: none  Specific topics discussed at today's visit: Questions answered regarding COVID-19 and revision of office schedule of visits due to pandemic  non-medical management of lightheadedness in pregnancy  Next visit:U/S for anatomic screening

## 2020-12-21 ENCOUNTER — HOSPITAL ENCOUNTER (OUTPATIENT)
Dept: WOMENS IMAGING | Facility: HOSPITAL | Age: 30
Discharge: HOME | End: 2020-12-21

## 2020-12-21 ENCOUNTER — OFFICE VISIT (OUTPATIENT)
Dept: OBSTETRICS AND GYNECOLOGY | Facility: HOSPITAL | Age: 30
End: 2020-12-21

## 2020-12-21 ENCOUNTER — LAB (OUTPATIENT)
Dept: LAB | Facility: HOSPITAL | Age: 30
End: 2020-12-21

## 2020-12-21 VITALS — SYSTOLIC BLOOD PRESSURE: 111 MMHG | BODY MASS INDEX: 40.64 KG/M2 | WEIGHT: 251.8 LBS | DIASTOLIC BLOOD PRESSURE: 65 MMHG

## 2020-12-21 DIAGNOSIS — Z98.84 HISTORY OF BARIATRIC SURGERY: ICD-10-CM

## 2020-12-21 DIAGNOSIS — E66.01 MATERNAL MORBID OBESITY, ANTEPARTUM (HCC): ICD-10-CM

## 2020-12-21 DIAGNOSIS — Z98.891 HISTORY OF 2 CESAREAN SECTIONS: ICD-10-CM

## 2020-12-21 DIAGNOSIS — O99.210 MATERNAL MORBID OBESITY, ANTEPARTUM (HCC): ICD-10-CM

## 2020-12-21 DIAGNOSIS — Z3A.18 18 WEEKS GESTATION OF PREGNANCY: ICD-10-CM

## 2020-12-21 DIAGNOSIS — Z98.891 HISTORY OF 2 CESAREAN SECTIONS: Primary | ICD-10-CM

## 2020-12-21 PROCEDURE — 76811 OB US DETAILED SNGL FETUS: CPT | Performed by: OBSTETRICS & GYNECOLOGY

## 2020-12-21 PROCEDURE — 36415 COLL VENOUS BLD VENIPUNCTURE: CPT | Performed by: OBSTETRICS & GYNECOLOGY

## 2020-12-21 PROCEDURE — 76811 OB US DETAILED SNGL FETUS: CPT

## 2020-12-21 PROCEDURE — 82105 ALPHA-FETOPROTEIN SERUM: CPT | Performed by: OBSTETRICS & GYNECOLOGY

## 2020-12-21 RX ORDER — PRENATAL WITH FERROUS FUM AND FOLIC ACID 3080; 920; 120; 400; 22; 1.84; 3; 20; 10; 1; 12; 200; 27; 25; 2 [IU]/1; [IU]/1; MG/1; [IU]/1; MG/1; MG/1; MG/1; MG/1; MG/1; MG/1; UG/1; MG/1; MG/1; MG/1; MG/1
1 TABLET ORAL DAILY
COMMUNITY
End: 2021-06-30

## 2020-12-21 NOTE — PROGRESS NOTES
Documentation of the ultasound findings, images, and interpretations will be available in the patient's Viewpoint report located in the Chart Review Imaging tab in [employer].

## 2020-12-28 ENCOUNTER — TELEPHONE (OUTPATIENT)
Dept: WOMENS IMAGING | Facility: HOSPITAL | Age: 30
End: 2020-12-28

## 2020-12-28 LAB — Lab: NORMAL

## 2020-12-28 NOTE — TELEPHONE ENCOUNTER
----- Message from Addison Spencer MD sent at 12/28/2020 11:29 AM EST -----  Please notify patient of these normal results. Please forward the results to her referring physician.   ----- Message -----  From: Lab, Background User  Sent: 12/28/2020   7:52 AM EST  To: Addison Spencer MD

## 2020-12-28 NOTE — TELEPHONE ENCOUNTER
Pt returned call to [facility]. Notified of that Dr Spencer reviewed her labs and noted a negative screen risk for ONTD. Pt v/u. Lab results routed to her OB provider.

## 2021-01-04 ENCOUNTER — ROUTINE PRENATAL (OUTPATIENT)
Dept: OBSTETRICS AND GYNECOLOGY | Facility: CLINIC | Age: 31
End: 2021-01-04

## 2021-01-04 VITALS — BODY MASS INDEX: 40.67 KG/M2 | DIASTOLIC BLOOD PRESSURE: 66 MMHG | SYSTOLIC BLOOD PRESSURE: 120 MMHG | WEIGHT: 252 LBS

## 2021-01-04 DIAGNOSIS — Z98.891 HISTORY OF 2 CESAREAN SECTIONS: Primary | ICD-10-CM

## 2021-01-04 DIAGNOSIS — F33.0 MILD EPISODE OF RECURRENT MAJOR DEPRESSIVE DISORDER (HCC): ICD-10-CM

## 2021-01-04 DIAGNOSIS — Z3A.22 22 WEEKS GESTATION OF PREGNANCY: ICD-10-CM

## 2021-01-04 DIAGNOSIS — E66.01 MATERNAL MORBID OBESITY, ANTEPARTUM (HCC): ICD-10-CM

## 2021-01-04 DIAGNOSIS — O99.210 MATERNAL MORBID OBESITY, ANTEPARTUM (HCC): ICD-10-CM

## 2021-01-04 PROCEDURE — 99213 OFFICE O/P EST LOW 20 MIN: CPT | Performed by: OBSTETRICS & GYNECOLOGY

## 2021-01-04 NOTE — PROGRESS NOTES
Chief Complaint   Patient presents with   • Routine Prenatal Visit     ob visit       HPI: Urszula is a  currently at 22w0d who today reports the following:Headache - No ; Visual change - No ; Swelling in legs - No ; Nausea - No ; Constipation - No; Diarrhea - No ; Contractions - No ; Leaking fluid - No ; Vaginal bleeding -  No.      ROS: Pertinent items are noted in HPI.  Vitals: See prenatal flowsheet     EXAM:  Abdomen: See physician component of prenatal flowsheet   Urine glucose/protein: See physician component of prenatal flowsheet   Pelvic: See physician component of prenatal flowsheet     Prenatal Labs  Lab Results   Component Value Date    HGB 13.1 10/09/2020    RUBELLAABIGG Equivocal 10/09/2020    HEPBSAG Non-Reactive 10/09/2020    ABO A 10/09/2020    RH Positive 10/09/2020    ABSCRN Negative 10/09/2020    [id number] Non-Reactive 10/09/2020    HEPCVIRUSABY Non-Reactive 10/09/2020    URINECX >100,000 CFU/mL Lactobacillus species (A) 10/09/2020       MDM:  Impression:Previous C/S with planned repeat C/S, Obesity and mood disorder stable on high risk medication. Possible abn placentation, repeat scan planned. Hx of gastric sleeve  Tests done today: none  Specific topics discussed at today's visit: Questions answered regarding COVID-19 and revision of office schedule of visits due to pandemic  No additional counseling given - she has no specific complaints or concerns  Next visit:GCT and U/S for anatomic screening

## 2021-02-01 ENCOUNTER — ROUTINE PRENATAL (OUTPATIENT)
Dept: OBSTETRICS AND GYNECOLOGY | Facility: CLINIC | Age: 31
End: 2021-02-01

## 2021-02-01 ENCOUNTER — LAB (OUTPATIENT)
Dept: LAB | Facility: HOSPITAL | Age: 31
End: 2021-02-01

## 2021-02-01 ENCOUNTER — OFFICE VISIT (OUTPATIENT)
Dept: OBSTETRICS AND GYNECOLOGY | Facility: HOSPITAL | Age: 31
End: 2021-02-01

## 2021-02-01 ENCOUNTER — HOSPITAL ENCOUNTER (OUTPATIENT)
Dept: WOMENS IMAGING | Facility: HOSPITAL | Age: 31
Discharge: HOME | End: 2021-02-01

## 2021-02-01 VITALS — DIASTOLIC BLOOD PRESSURE: 59 MMHG | BODY MASS INDEX: 41.32 KG/M2 | SYSTOLIC BLOOD PRESSURE: 113 MMHG | WEIGHT: 256 LBS

## 2021-02-01 VITALS — DIASTOLIC BLOOD PRESSURE: 59 MMHG | WEIGHT: 256.4 LBS | BODY MASS INDEX: 41.38 KG/M2 | SYSTOLIC BLOOD PRESSURE: 113 MMHG

## 2021-02-01 DIAGNOSIS — F33.0 MILD EPISODE OF RECURRENT MAJOR DEPRESSIVE DISORDER (HCC): ICD-10-CM

## 2021-02-01 DIAGNOSIS — Z34.90 PREGNANCY, UNSPECIFIED GESTATIONAL AGE: ICD-10-CM

## 2021-02-01 DIAGNOSIS — E66.01 MATERNAL MORBID OBESITY, ANTEPARTUM (HCC): ICD-10-CM

## 2021-02-01 DIAGNOSIS — Z98.891 HISTORY OF 2 CESAREAN SECTIONS: ICD-10-CM

## 2021-02-01 DIAGNOSIS — O09.40 ENCOUNTER FOR SUPERVISION OF HIGH RISK PREGNANCY WITH GRAND MULTIPARITY, ANTEPARTUM: ICD-10-CM

## 2021-02-01 DIAGNOSIS — Z98.84 HISTORY OF BARIATRIC SURGERY: ICD-10-CM

## 2021-02-01 DIAGNOSIS — O99.210 MATERNAL MORBID OBESITY, ANTEPARTUM (HCC): ICD-10-CM

## 2021-02-01 DIAGNOSIS — O99.210 MATERNAL MORBID OBESITY, ANTEPARTUM (HCC): Primary | ICD-10-CM

## 2021-02-01 DIAGNOSIS — E66.01 MATERNAL MORBID OBESITY, ANTEPARTUM (HCC): Primary | ICD-10-CM

## 2021-02-01 DIAGNOSIS — Z3A.26 26 WEEKS GESTATION OF PREGNANCY: Primary | ICD-10-CM

## 2021-02-01 DIAGNOSIS — Z3A.22 22 WEEKS GESTATION OF PREGNANCY: ICD-10-CM

## 2021-02-01 LAB — GLUCOSE 1H P 100 G GLC PO SERPL-MCNC: 117 MG/DL (ref 65–140)

## 2021-02-01 PROCEDURE — 82950 GLUCOSE TEST: CPT

## 2021-02-01 PROCEDURE — 99214 OFFICE O/P EST MOD 30 MIN: CPT | Performed by: OBSTETRICS & GYNECOLOGY

## 2021-02-01 PROCEDURE — 36415 COLL VENOUS BLD VENIPUNCTURE: CPT

## 2021-02-01 PROCEDURE — 76816 OB US FOLLOW-UP PER FETUS: CPT

## 2021-02-01 PROCEDURE — 76816 OB US FOLLOW-UP PER FETUS: CPT | Performed by: OBSTETRICS & GYNECOLOGY

## 2021-02-01 RX ORDER — CYCLOBENZAPRINE HCL 5 MG
5 TABLET ORAL
COMMUNITY
Start: 2020-11-17 | End: 2021-04-07

## 2021-02-01 NOTE — PROGRESS NOTES
Chief Complaint   Patient presents with   • Routine Prenatal Visit     ob visit with pdc appt       HPI: Urszula is a  currently at 26w0d who today reports the following:Headache - No ; Visual change - No ; Swelling in legs - No ; Nausea - No ; Constipation - No; Diarrhea - No ; Contractions - No ; Leaking fluid - No ; Vaginal bleeding -  No.      ROS: Pertinent items are noted in HPI.  Vitals: See prenatal flowsheet     EXAM:  Abdomen: See physician component of prenatal flowsheet   Urine glucose/protein: See physician component of prenatal flowsheet   Pelvic: See physician component of prenatal flowsheet     Prenatal Labs  Lab Results   Component Value Date    HGB 13.1 10/09/2020    RUBELLAABIGG Equivocal 10/09/2020    HEPBSAG Non-Reactive 10/09/2020    ABO A 10/09/2020    RH Positive 10/09/2020    ABSCRN Negative 10/09/2020    [id number] Non-Reactive 10/09/2020    HEPCVIRUSABY Non-Reactive 10/09/2020    URINECX >100,000 CFU/mL Lactobacillus species (A) 10/09/2020       MDM: High Risk Pregnancy  Impression:Multiparous patient with medical complications, Previous C/S with planned repeat C/S, Obesity and post Bariatric Surgery, doing well from nutritional standpoint at this time. MDD: satable on high risk medication  Tests done today: GCT and U/S to complete the anatomic screening  Ultrasound reveiwed.  Specific topics discussed at today's visit: Questions answered regarding COVID-19 and revision of office schedule of visits due to pandemic  Maternal monitoring of fetal movement   labor signs and symptoms  Symptoms of preeclampsia  Next visit:none

## 2021-02-25 ENCOUNTER — ROUTINE PRENATAL (OUTPATIENT)
Dept: OBSTETRICS AND GYNECOLOGY | Facility: CLINIC | Age: 31
End: 2021-02-25

## 2021-02-25 VITALS — WEIGHT: 256 LBS | BODY MASS INDEX: 41.32 KG/M2 | DIASTOLIC BLOOD PRESSURE: 76 MMHG | SYSTOLIC BLOOD PRESSURE: 122 MMHG

## 2021-02-25 DIAGNOSIS — Z98.84 HISTORY OF BARIATRIC SURGERY: Primary | ICD-10-CM

## 2021-02-25 DIAGNOSIS — F33.0 MILD EPISODE OF RECURRENT MAJOR DEPRESSIVE DISORDER (HCC): ICD-10-CM

## 2021-02-25 DIAGNOSIS — Z3A.29 29 WEEKS GESTATION OF PREGNANCY: ICD-10-CM

## 2021-02-25 DIAGNOSIS — Z98.891 HISTORY OF 2 CESAREAN SECTIONS: ICD-10-CM

## 2021-02-25 PROCEDURE — 99214 OFFICE O/P EST MOD 30 MIN: CPT | Performed by: OBSTETRICS & GYNECOLOGY

## 2021-02-25 NOTE — PROGRESS NOTES
Chief Complaint   Patient presents with   • Routine Prenatal Visit     ob visit       HPI: Urszula is a  currently at 29w3d who today reports the following:Headache - No ; Visual change - No ; Swelling in legs - No ; Nausea - No ; Constipation - No; Diarrhea - No ; Contractions - No ; Leaking fluid - No ; Vaginal bleeding -  No.      ROS: Pertinent items are noted in HPI.  Vitals: See prenatal flowsheet     EXAM:  Abdomen: See physician component of prenatal flowsheet   Urine glucose/protein: See physician component of prenatal flowsheet   Pelvic: See physician component of prenatal flowsheet     Prenatal Labs  Lab Results   Component Value Date    HGB 13.1 10/09/2020    RUBELLAABIGG Equivocal 10/09/2020    HEPBSAG Non-Reactive 10/09/2020    ABO A 10/09/2020    RH Positive 10/09/2020    ABSCRN Negative 10/09/2020    [id number] Non-Reactive 10/09/2020    HEPCVIRUSABY Non-Reactive 10/09/2020    URINECX >100,000 CFU/mL Lactobacillus species (A) 10/09/2020       MDM:  Impression:Multiparous patient with medical complications, Previous C/S with planned repeat C/S, Obesity and mood disorder stable on SSRI. Hx of baratric surgery  Tests done today: none  Specific topics discussed at today's visit: Questions answered regarding COVID-19 and revision of office schedule of visits due to pandemic  Breast feeding  Maternal monitoring of fetal movement   labor signs and symptoms  sterilization   Next visit:none

## 2021-03-01 ENCOUNTER — TELEPHONE (OUTPATIENT)
Dept: GASTROENTEROLOGY | Facility: CLINIC | Age: 31
End: 2021-03-01

## 2021-03-01 DIAGNOSIS — R12 HEARTBURN: ICD-10-CM

## 2021-03-01 RX ORDER — PANTOPRAZOLE SODIUM 40 MG/1
40 TABLET, DELAYED RELEASE ORAL
Qty: 30 TABLET | Refills: 11 | Status: SHIPPED | OUTPATIENT
Start: 2021-03-01 | End: 2021-04-24 | Stop reason: HOSPADM

## 2021-03-09 ENCOUNTER — TELEPHONE (OUTPATIENT)
Dept: OBSTETRICS AND GYNECOLOGY | Facility: CLINIC | Age: 31
End: 2021-03-09

## 2021-03-09 NOTE — TELEPHONE ENCOUNTER
Matt pt called stating when standing her heart is beating really fast and breaks out into a hot sweat, makes her vomit or have a bowel movement, also sees stars. Has history of high blood pressure with previous pregnancy blood pressure now is 127/75. When she lays down on her left side it goes away. Please contact back and advise

## 2021-03-09 NOTE — TELEPHONE ENCOUNTER
Patient advised to increase fluid intake and try a small snack that is not sweet. Symptoms could be related to low blood sugar.  If not resolved in an hour or so to come to [facility] for evaluation.

## 2021-03-09 NOTE — TELEPHONE ENCOUNTER
If fetal movement is normal, I would suggest trying to push fluids and a small snack that is not sweet. As her BP seems normal, perhaps these are symptoms of lower blood sugar. If not resolved in an hour or so to Labor and delivery. If there is a concern about fetal movement, to labor and delivery now

## 2021-03-09 NOTE — TELEPHONE ENCOUNTER
31 weeks stating her heart races, hot flashes, sees stars that has happened a couple times this morning.  Patient states when she has these symptoms it makes her vomit or have bowel movement.  Patient is at home from work laying down on left side.  She states her blood pressure was 127/75 with heart rate 130.

## 2021-03-10 ENCOUNTER — APPOINTMENT (OUTPATIENT)
Dept: GENERAL RADIOLOGY | Facility: HOSPITAL | Age: 31
End: 2021-03-10

## 2021-03-10 ENCOUNTER — HOSPITAL ENCOUNTER (EMERGENCY)
Facility: HOSPITAL | Age: 31
Discharge: HOME | End: 2021-03-10
Attending: EMERGENCY MEDICINE | Admitting: EMERGENCY MEDICINE

## 2021-03-10 ENCOUNTER — HOSPITAL ENCOUNTER (OUTPATIENT)
Facility: HOSPITAL | Age: 31
Discharge: HOME | End: 2021-03-10
Attending: OBSTETRICS & GYNECOLOGY | Admitting: OBSTETRICS & GYNECOLOGY

## 2021-03-10 VITALS
OXYGEN SATURATION: 98 % | TEMPERATURE: 97.8 F | SYSTOLIC BLOOD PRESSURE: 132 MMHG | DIASTOLIC BLOOD PRESSURE: 74 MMHG | HEART RATE: 117 BPM | RESPIRATION RATE: 20 BRPM

## 2021-03-10 VITALS
OXYGEN SATURATION: 100 % | SYSTOLIC BLOOD PRESSURE: 142 MMHG | RESPIRATION RATE: 20 BRPM | BODY MASS INDEX: 40.98 KG/M2 | HEART RATE: 96 BPM | TEMPERATURE: 99.2 F | DIASTOLIC BLOOD PRESSURE: 88 MMHG | WEIGHT: 255 LBS | HEIGHT: 66 IN

## 2021-03-10 DIAGNOSIS — R00.0 TACHYCARDIA: Primary | ICD-10-CM

## 2021-03-10 DIAGNOSIS — R00.2 PALPITATIONS: ICD-10-CM

## 2021-03-10 DIAGNOSIS — Z3A.31 31 WEEKS GESTATION OF PREGNANCY: ICD-10-CM

## 2021-03-10 LAB
ALBUMIN SERPL-MCNC: 3.5 G/DL (ref 3.5–5.2)
ALBUMIN/GLOB SERPL: 1.1 G/DL
ALP SERPL-CCNC: 79 U/L (ref 39–117)
ALT SERPL W P-5'-P-CCNC: 7 U/L (ref 1–33)
ANION GAP SERPL CALCULATED.3IONS-SCNC: 12 MMOL/L (ref 5–15)
AST SERPL-CCNC: 12 U/L (ref 1–32)
B-HCG UR QL: POSITIVE
BASOPHILS # BLD AUTO: 0.03 10*3/MM3 (ref 0–0.2)
BASOPHILS NFR BLD AUTO: 0.2 % (ref 0–1.5)
BILIRUB SERPL-MCNC: 0.2 MG/DL (ref 0–1.2)
BILIRUB UR QL STRIP: NEGATIVE
BUN SERPL-MCNC: 7 MG/DL (ref 6–20)
BUN/CREAT SERPL: 12.7 (ref 7–25)
CALCIUM SPEC-SCNC: 9 MG/DL (ref 8.6–10.5)
CHLORIDE SERPL-SCNC: 107 MMOL/L (ref 98–107)
CLARITY UR: CLEAR
CO2 SERPL-SCNC: 18 MMOL/L (ref 22–29)
COLOR UR: YELLOW
CREAT SERPL-MCNC: 0.55 MG/DL (ref 0.57–1)
DEPRECATED RDW RBC AUTO: 41.8 FL (ref 37–54)
EOSINOPHIL # BLD AUTO: 0.04 10*3/MM3 (ref 0–0.4)
EOSINOPHIL NFR BLD AUTO: 0.3 % (ref 0.3–6.2)
ERYTHROCYTE [DISTWIDTH] IN BLOOD BY AUTOMATED COUNT: 15.3 % (ref 12.3–15.4)
GFR SERPL CREATININE-BSD FRML MDRD: 131 ML/MIN/1.73
GLOBULIN UR ELPH-MCNC: 3.2 GM/DL
GLUCOSE SERPL-MCNC: 120 MG/DL (ref 65–99)
GLUCOSE UR STRIP-MCNC: NEGATIVE MG/DL
HCT VFR BLD AUTO: 31.4 % (ref 34–46.6)
HGB BLD-MCNC: 9.7 G/DL (ref 12–15.9)
HGB UR QL STRIP.AUTO: NEGATIVE
HOLD SPECIMEN: NORMAL
IMM GRANULOCYTES # BLD AUTO: 0.07 10*3/MM3 (ref 0–0.05)
IMM GRANULOCYTES NFR BLD AUTO: 0.5 % (ref 0–0.5)
KETONES UR QL STRIP: ABNORMAL
LEUKOCYTE ESTERASE UR QL STRIP.AUTO: NEGATIVE
LYMPHOCYTES # BLD AUTO: 3.04 10*3/MM3 (ref 0.7–3.1)
LYMPHOCYTES NFR BLD AUTO: 21.1 % (ref 19.6–45.3)
MAGNESIUM SERPL-MCNC: 1.8 MG/DL (ref 1.6–2.6)
MCH RBC QN AUTO: 23.7 PG (ref 26.6–33)
MCHC RBC AUTO-ENTMCNC: 30.9 G/DL (ref 31.5–35.7)
MCV RBC AUTO: 76.8 FL (ref 79–97)
MONOCYTES # BLD AUTO: 1.25 10*3/MM3 (ref 0.1–0.9)
MONOCYTES NFR BLD AUTO: 8.7 % (ref 5–12)
NEUTROPHILS NFR BLD AUTO: 10 10*3/MM3 (ref 1.7–7)
NEUTROPHILS NFR BLD AUTO: 69.2 % (ref 42.7–76)
NITRITE UR QL STRIP: NEGATIVE
NRBC BLD AUTO-RTO: 0 /100 WBC (ref 0–0.2)
NT-PROBNP SERPL-MCNC: 44.4 PG/ML (ref 0–450)
PH UR STRIP.AUTO: 5.5 [PH] (ref 5–8)
PLATELET # BLD AUTO: 285 10*3/MM3 (ref 140–450)
PMV BLD AUTO: 9.7 FL (ref 6–12)
POTASSIUM SERPL-SCNC: 3.4 MMOL/L (ref 3.5–5.2)
PROT SERPL-MCNC: 6.7 G/DL (ref 6–8.5)
PROT UR QL STRIP: NEGATIVE
RBC # BLD AUTO: 4.09 10*6/MM3 (ref 3.77–5.28)
SODIUM SERPL-SCNC: 137 MMOL/L (ref 136–145)
SP GR UR STRIP: 1.03 (ref 1–1.03)
TROPONIN T SERPL-MCNC: <0.01 NG/ML (ref 0–0.03)
TSH SERPL DL<=0.05 MIU/L-ACNC: 1.54 UIU/ML (ref 0.27–4.2)
UROBILINOGEN UR QL STRIP: ABNORMAL
WBC # BLD AUTO: 14.43 10*3/MM3 (ref 3.4–10.8)
WHOLE BLOOD HOLD SPECIMEN: NORMAL
WHOLE BLOOD HOLD SPECIMEN: NORMAL

## 2021-03-10 PROCEDURE — 59025 FETAL NON-STRESS TEST: CPT

## 2021-03-10 PROCEDURE — 99214 OFFICE O/P EST MOD 30 MIN: CPT | Performed by: OBSTETRICS & GYNECOLOGY

## 2021-03-10 PROCEDURE — 93005 ELECTROCARDIOGRAM TRACING: CPT | Performed by: EMERGENCY MEDICINE

## 2021-03-10 PROCEDURE — 80050 GENERAL HEALTH PANEL: CPT

## 2021-03-10 PROCEDURE — 81003 URINALYSIS AUTO W/O SCOPE: CPT | Performed by: EMERGENCY MEDICINE

## 2021-03-10 PROCEDURE — 83735 ASSAY OF MAGNESIUM: CPT

## 2021-03-10 PROCEDURE — 99283 EMERGENCY DEPT VISIT LOW MDM: CPT

## 2021-03-10 PROCEDURE — 71045 X-RAY EXAM CHEST 1 VIEW: CPT

## 2021-03-10 PROCEDURE — G0463 HOSPITAL OUTPT CLINIC VISIT: HCPCS

## 2021-03-10 PROCEDURE — 84484 ASSAY OF TROPONIN QUANT: CPT

## 2021-03-10 PROCEDURE — 59025 FETAL NON-STRESS TEST: CPT | Performed by: OBSTETRICS & GYNECOLOGY

## 2021-03-10 PROCEDURE — 81025 URINE PREGNANCY TEST: CPT | Performed by: EMERGENCY MEDICINE

## 2021-03-10 PROCEDURE — 93005 ELECTROCARDIOGRAM TRACING: CPT

## 2021-03-10 PROCEDURE — 83880 ASSAY OF NATRIURETIC PEPTIDE: CPT

## 2021-03-10 RX ORDER — SODIUM CHLORIDE 0.9 % (FLUSH) 0.9 %
10 SYRINGE (ML) INJECTION AS NEEDED
Status: DISCONTINUED | OUTPATIENT
Start: 2021-03-10 | End: 2021-03-11 | Stop reason: HOSPADM

## 2021-03-10 RX ADMIN — SODIUM CHLORIDE 1000 ML: 9 INJECTION, SOLUTION INTRAVENOUS at 21:44

## 2021-03-10 NOTE — TELEPHONE ENCOUNTER
Pt called back today c/o heart rate is 150 when standing and 100 when sitting and states when it is increased that high it wipes her out. Please advise

## 2021-03-10 NOTE — H&P
[facility]  Obstetric History and Physical    Chief Complaint   Patient presents with   • Rapid Heart Rate       Subjective     Patient is a 30 y.o. female  currently at 31w2d, who presents with complaining of tachycardia.  She states she had similar symptoms with her last pregnancy.  This is associated with dizziness and weakness.  She denies chest pain or shortness of breath.  She denies contractions, vaginal bleeding or leaking fluid.    Her prenatal care is otherwise benign. Her previous obstetric/gynecological history is notable for 2 prior  sections.    The following portions of the patients history were reviewed and updated as appropriate: current medications, allergies, past medical history, past surgical history, past family history, past social history and problem list .        Prenatal Information:   Maternal Prenatal Labs  Blood Type No results found for: ABO   Rh Status No results found for: RH   Antibody Screen No results found for: ABSCRN   Gonnorhea No results found for: GCCX   Chlamydia No results found for: CLAMYDCU   RPR No results found for: RPR   Syphilis Antibody No results found for: SYPHILIS   Rubella No results found for: RUBELLAIGGIN   Hepatitis B Surface Antigen No results found for: HEPBSAG   HIV-1 Antibody No results found for: LABHIV1   Hepatitis C Antibody No results found for: HEPCAB   Rapid Urin Drug Screen No results found for: AMPMETHU, BARBITSCNUR, LABBENZSCN, LABMETHSCN, LABOPIASCN, THCURSCR, COCAINEUR, AMPHETSCREEN, PROPOXSCN, BUPRENORSCNU, METAMPSCNUR, OXYCODONESCN, TRICYCLICSCN   Group B Strep Culture No results found for: GBSANTIGEN           External Prenatal Results     Pregnancy Outside Results - Transcribed From Office Records - See Scanned Records For Details     Test Value Date Time    Hgb  13.1 g/dL 10/09/20 1216    Hct  39.8 % 10/09/20 1216    ABO  A  10/09/20 1216    Rh  Positive  10/09/20 1216    Antibody Screen  Negative  10/09/20 1216    Glucose  Fasting GTT       Glucose Tolerance Test 1 hour       Glucose Tolerance Test 3 hour       Gonorrhea (discrete)       Chlamydia (discrete)       RPR  Non-Reactive  10/09/20 1216    VDRL       Syphilis Antibody       Rubella  Equivocal  10/09/20 1216    HBsAg  Non-Reactive  10/09/20 1216    Herpes Simplex Virus PCR       Herpes Simplex VIrus Culture       HIV  Non-Reactive  10/09/20 1216    Hep C RNA Quant PCR       Hep C Antibody  Non-Reactive  10/09/20 1216    AFP       Group B Strep       GBS Susceptibility to Clindamycin       GBS Susceptibility to Erythromycin       Fetal Fibronectin       Genetic Testing, Maternal Blood             Drug Screening     Test Value Date Time    Urine Drug Screen       Amphetamine Screen       Barbiturate Screen       Benzodiazepine Screen       Methadone Screen       Phencyclidine Screen       Opiates Screen       THC Screen       Cocaine Screen       Propoxyphene Screen       Buprenorphine Screen       Methamphetamine Screen       Oxycodone Screen       Tricyclic Antidepressants Screen             Legend    ^: Historical                          Past OB History:       OB History    Para Term  AB Living   3 2 2 0 0 2   SAB TAB Ectopic Molar Multiple Live Births   0 0 0 0 0 2      # Outcome Date GA Lbr Rafael/2nd Weight Sex Delivery Anes PTL Lv   3 Current            2 Term 19 39w0d  2722 g (6 lb) M CS-Unspec EPI  LOUIE   1 Term 17 40w1d  4082 g (9 lb) F CS-Unspec EPI  LOUIE      Complications: Failure to Progress in Second Stage, PIH (pregnancy induced hypertension)       Past Medical History:  Past Medical History:   Diagnosis Date   • Anxiety    • Bradycardia, unspecified    • Dysfunctional gallbladder    • GERD (gastroesophageal reflux disease)    • H. pylori infection    • Ovarian cyst       Past Surgical History Past Surgical History:   Procedure Laterality Date   •  SECTION  2018   •  SECTION PRIMARY  2016   • CHOLECYSTECTOMY N/A  3/13/2020    Procedure: CHOLECYSTECTOMY LAPAROSCOPIC;  Surgeon: Toby Murphy MD;  Location: [facility];  Service: General;  Laterality: N/A;   • COLONOSCOPY     • ENDOSCOPY     • GASTRIC SLEEVE LAPAROSCOPIC     • TONSILLECTOMY        Family History: Family History   Problem Relation Age of Onset   • Thyroid disease Mother    • Graves' disease Mother    • Myasthenia gravis Mother    • Celiac disease Sister       Social History:  reports that she has never smoked. She has never used smokeless tobacco.   reports previous alcohol use.   reports no history of drug use.   Allergies: Ciprofloxacin  Current Medications:          No current facility-administered medications on file prior to encounter.     Current Outpatient Medications on File Prior to Encounter   Medication Sig Dispense Refill   • FLUoxetine (PROzac) 20 MG capsule Take 1 capsule by mouth Daily. 30 capsule 5   • pantoprazole (PROTONIX) 40 MG EC tablet Take 1 tablet by mouth Every Morning Before Breakfast. 30 tablet 11   • polyethylene glycol (MIRALAX) powder Take 17 g by mouth Daily. (Patient taking differently: Take 17 g by mouth Daily As Needed.) 510 g 2   • Prenatal Vit-Fe Fumarate-FA (Prenatal 27-1) 27-1 MG tablet tablet Take 1 tablet by mouth Daily.     • cyclobenzaprine (FLEXERIL) 5 MG tablet      • ondansetron (ZOFRAN) 4 MG tablet Take 4 mg by mouth Every 8 (Eight) Hours As Needed for Nausea or Vomiting.         General ROS: Pertinent items are noted in HPI, all other systems reviewed and negative    Objective       Vital Signs Range for the last 24 hours  Temperature: Temp:  [97.8 °F (36.6 °C)] 97.8 °F (36.6 °C)   Temp Source:     BP: BP: (127-128)/(73-83) 128/73   Pulse: Heart Rate:  [110-117] 117   Respirations: Resp:  [20] 20   SPO2: SpO2:  [97 %-99 %] 98 %   O2 Amount (l/min):     O2 Devices     Weight:       Physical Examination: General appearance - alert, well appearing, and in no distress, oriented to person, place, and time and  "overweight  Mental status - alert, oriented to person, place, and time, normal mood, behavior, speech, dress, motor activity, and thought processes  Abdomen-soft, nontender, nondistended, no masses or organomegaly   Abdomen, Non-Tender  Pelvic -deferred    Fetal Heart Rate Assessment  Indication: Maternal tachycardia   Start Time: 1741                end Time: 1817   NST Results: Reactive NST.  Baseline fetal heart rate 115-125 bpm.  Average variability with multiple 15 x 15 accelerations.  No decelerations.  No regular uterine contraction pattern.        Laboratory Results:   Lab Results   Component Value Date    ALKPHOS 70 2020    ALT 12 2020    AST 16 2020    CREATININE 0.88 2020    BILITOT 0.2 2020       No results found for: WBC, RBC, HGB, HCT, MCV, MCH, MCHC, RDW, RDWSD, MPV, PLT, NEUTRORELPCT, LYMPHORELPCT, MONORELPCT, EOSRELPCT, BASORELPCT, AUTOIGPER, NEUTROABS, LYMPHSABS, MONOSABS, EOSABS, BASOSABS, AUTOIGNUM, NRBC          Brief Urine Lab Results  (Last result in the past 365 days)      Color   Clarity   Blood   Leuk Est   Nitrite   Protein   CREAT   Urine HCG        10/09/20 1216 Yellow Clear Negative Negative Negative Negative                 Radiology Review: No new studies  Other Studies: None    Assessment/Plan       * No active hospital problems. *        Assessment:  1. Complaints of maternal tachycardia.  2. History of  section x2  3. Maternal morbid obesity    Plan:  1. Discharge patient.  2. Patient will be sent to the emergency room for further evaluation of her tachycardia.     Total time spent today with Urszula  was 10-19 minutes (level 2).  Of this time, > 50% was spent face-to-face time coordinating care, answering her questions and counseling regarding pathophysiology of her presenting problem along with plans for any diagnostic work-up and treatment.        Gilbert Mancilla \"Carla\" HENRIETTA Cárdenas MD  3/10/2021  18:20 EST    "

## 2021-03-11 NOTE — ED PROVIDER NOTES
EMERGENCY DEPARTMENT ENCOUNTER      Pt Name: Urszula Gonzalez  MRN: [id number]  YOB: 1990  Date of evaluation: 3/10/2021  Provider: Casey Sam MD    CHIEF COMPLAINT       Chief Complaint   Patient presents with   • Rapid Heart Rate         HISTORY OF PRESENT ILLNESS  (Location/Symptom, Timing/Onset, Context/Setting, Quality, Duration, Modifying Factors, Severity.)   Urszula Gonzalez is a 30 y.o. female who presents to the emergency department with positional tachycardia that she has noticed as her pregnancy progresses. She states that she primarily notices this when she is standing up for prolonged periods of time or when she gets up quickly from a seated or lying position. She does have a history of POTS and had similar issues with her last pregnancy later on in the pregnancy. She was seen in OB triage prior to coming down to the emergency department today and had a normal work-up there. She denies any chest pain, shortness of breath, abdominal pain, back pain, or vaginal bleeding in association with the symptoms. She is not had any vomiting or diarrhea. She describes it as mild in severity.      Nursing notes were reviewed.    REVIEW OF SYSTEMS    (2-9 systems for level 4, 10 or more for level 5)   ROS:  General:  No fevers, no chills, no weakness  Cardiovascular: Palpitations  Respiratory:  No shortness of breath, no cough, no wheezing  Gastrointestinal:  No pain, no nausea, no vomiting, no diarrhea  Musculoskeletal:  No muscle pain, no joint pain  Skin:  No rash, no easy bruising  Neurologic:  No speech problems, no headache, no extremity numbness, no extremity tingling, no extremity weakness  Psychiatric:  No anxiety  Genitourinary:  No dysuria, no hematuria    Except as noted above the remainder of the review of systems was reviewed and negative.       PAST MEDICAL HISTORY     Past Medical History:   Diagnosis Date   • Anxiety    • Bradycardia, unspecified    • Dysfunctional gallbladder    •  GERD (gastroesophageal reflux disease)    • H. pylori infection    • Ovarian cyst          SURGICAL HISTORY       Past Surgical History:   Procedure Laterality Date   •  SECTION  2018   •  SECTION PRIMARY  2016   • CHOLECYSTECTOMY N/A 3/13/2020    Procedure: CHOLECYSTECTOMY LAPAROSCOPIC;  Surgeon: Toby Murphy MD;  Location: [facility];  Service: General;  Laterality: N/A;   • COLONOSCOPY     • ENDOSCOPY     • GASTRIC SLEEVE LAPAROSCOPIC     • TONSILLECTOMY           CURRENT MEDICATIONS     No current facility-administered medications for this encounter.    Current Outpatient Medications:   •  cyclobenzaprine (FLEXERIL) 5 MG tablet, , Disp: , Rfl:   •  FLUoxetine (PROzac) 20 MG capsule, Take 1 capsule by mouth Daily., Disp: 30 capsule, Rfl: 5  •  ondansetron (ZOFRAN) 4 MG tablet, Take 4 mg by mouth Every 8 (Eight) Hours As Needed for Nausea or Vomiting., Disp: , Rfl:   •  pantoprazole (PROTONIX) 40 MG EC tablet, Take 1 tablet by mouth Every Morning Before Breakfast., Disp: 30 tablet, Rfl: 11  •  polyethylene glycol (MIRALAX) powder, Take 17 g by mouth Daily. (Patient taking differently: Take 17 g by mouth Daily As Needed.), Disp: 510 g, Rfl: 2  •  Prenatal Vit-Fe Fumarate-FA (Prenatal 27-) 27-1 MG tablet tablet, Take 1 tablet by mouth Daily., Disp: , Rfl:     ALLERGIES     Ciprofloxacin    FAMILY HISTORY       Family History   Problem Relation Age of Onset   • Thyroid disease Mother    • Graves' disease Mother    • Myasthenia gravis Mother    • Celiac disease Sister           SOCIAL HISTORY       Social History     Socioeconomic History   • Marital status:      Spouse name: nate gary   • Number of children: 2   • Years of education: Not on file   • Highest education level: High school graduate   Tobacco Use   • Smoking status: Never Smoker   • Smokeless tobacco: Never Used   Substance and Sexual Activity   • Alcohol use: Not Currently     Comment: social   • Drug use: No  "  • Sexual activity: Defer     Partners: Male     Birth control/protection: None         PHYSICAL EXAM    (up to 7 for level 4, 8 or more for level 5)     Vitals:    03/10/21 1839 03/10/21 2200   BP: 142/88    BP Location: Right arm    Patient Position: Sitting    Pulse: 111 96   Resp: 20    Temp: 99.2 °F (37.3 °C)    TempSrc: Oral    SpO2: 100% 100%   Weight: 116 kg (255 lb)    Height: 167.6 cm (66\")        Physical Exam  General: Awake, alert, no acute distress.  HEENT: Conjunctiva normal.  Neck: Trachea midline.  Cardiac: Tacky, regular rhythm, no murmurs, rubs, or gallops  Lungs: Lungs are clear to auscultation, there is no wheezing, rhonchi, or rales. There is no use of accessory muscles.  Chest wall: There is no tenderness to palpation over the chest wall or over ribs  Abdomen: Abdomen is soft, nontender, nondistended. There are no firm or pulsatile masses, no rebound rigidity or guarding.   Musculoskeletal: No deformity.  Neuro: Alert and oriented x 4.  Dermatology: Skin is warm and dry  Psych: Mentation is grossly normal, cognition is grossly normal. Affect is appropriate.        DIAGNOSTIC RESULTS     EKG: All EKG's are interpreted by the Emergency Department Physician who either signs or Co-signs this chart in the absence of a cardiologist.    Sinus tachycardia rate 105, no acute ST segment or T wave changes, normal intervals, no ectopy    RADIOLOGY:   Non-plain film images such as CT, Ultrasound and MRI are read by the radiologist. Plain radiographic images are visualized and preliminarily interpreted by the emergency physician with the below findings:      [x] Radiologist's Report Reviewed:  XR Chest 1 View   Final Result   No acute cardiopulmonary findings.      Signer Name: Juan M Titus MD    Signed: 3/10/2021 10:06 PM    Workstation Name: [email or web address]     [facility]            LABS:    I have reviewed and interpreted all of the currently available lab results from this visit " (if applicable):  Results for orders placed or performed during the hospital encounter of 03/10/21   Comprehensive Metabolic Panel    Specimen: Blood   Result Value Ref Range    Glucose 120 (H) 65 - 99 mg/dL    BUN 7 6 - 20 mg/dL    Creatinine 0.55 (L) 0.57 - 1.00 mg/dL    Sodium 137 136 - 145 mmol/L    Potassium 3.4 (L) 3.5 - 5.2 mmol/L    Chloride 107 98 - 107 mmol/L    CO2 18.0 (L) 22.0 - 29.0 mmol/L    Calcium 9.0 8.6 - 10.5 mg/dL    Total Protein 6.7 6.0 - 8.5 g/dL    Albumin 3.50 3.50 - 5.20 g/dL    ALT (SGPT) 7 1 - 33 U/L    AST (SGOT) 12 1 - 32 U/L    Alkaline Phosphatase 79 39 - 117 U/L    Total Bilirubin 0.2 0.0 - 1.2 mg/dL    eGFR Non African Amer 131 >60 mL/min/1.73    Globulin 3.2 gm/dL    A/G Ratio 1.1 g/dL    BUN/Creatinine Ratio 12.7 7.0 - 25.0    Anion Gap 12.0 5.0 - 15.0 mmol/L   Magnesium    Specimen: Blood   Result Value Ref Range    Magnesium 1.8 1.6 - 2.6 mg/dL   Troponin    Specimen: Blood   Result Value Ref Range    Troponin T <0.010 0.000 - 0.030 ng/mL   TSH    Specimen: Blood   Result Value Ref Range    TSH 1.540 0.270 - 4.200 uIU/mL   BNP    Specimen: Blood   Result Value Ref Range    proBNP 44.4 0.0 - 450.0 pg/mL   Gray Top - Ice   Result Value Ref Range    Extra Tube Hold for add-ons.    CBC Auto Differential    Specimen: Blood   Result Value Ref Range    WBC 14.43 (H) 3.40 - 10.80 10*3/mm3    RBC 4.09 3.77 - 5.28 10*6/mm3    Hemoglobin 9.7 (L) 12.0 - 15.9 g/dL    Hematocrit 31.4 (L) 34.0 - 46.6 %    MCV 76.8 (L) 79.0 - 97.0 fL    MCH 23.7 (L) 26.6 - 33.0 pg    MCHC 30.9 (L) 31.5 - 35.7 g/dL    RDW 15.3 12.3 - 15.4 %    RDW-SD 41.8 37.0 - 54.0 fl    MPV 9.7 6.0 - 12.0 fL    Platelets 285 140 - 450 10*3/mm3    Neutrophil % 69.2 42.7 - 76.0 %    Lymphocyte % 21.1 19.6 - 45.3 %    Monocyte % 8.7 5.0 - 12.0 %    Eosinophil % 0.3 0.3 - 6.2 %    Basophil % 0.2 0.0 - 1.5 %    Immature Grans % 0.5 0.0 - 0.5 %    Neutrophils, Absolute 10.00 (H) 1.70 - 7.00 10*3/mm3    Lymphocytes, Absolute 3.04  "0.70 - 3.10 10*3/mm3    Monocytes, Absolute 1.25 (H) 0.10 - 0.90 10*3/mm3    Eosinophils, Absolute 0.04 0.00 - 0.40 10*3/mm3    Basophils, Absolute 0.03 0.00 - 0.20 10*3/mm3    Immature Grans, Absolute 0.07 (H) 0.00 - 0.05 10*3/mm3    nRBC 0.0 0.0 - 0.2 /100 WBC   Urinalysis With Microscopic If Indicated (No Culture) - Urine, Clean Catch    Specimen: Urine, Clean Catch   Result Value Ref Range    Color, UA Yellow Yellow, Straw    Appearance, UA Clear Clear    pH, UA 5.5 5.0 - 8.0    Specific Gravity, UA 1.028 1.001 - 1.030    Glucose, UA Negative Negative    Ketones, UA Trace (A) Negative    Bilirubin, UA Negative Negative    Blood, UA Negative Negative    Protein, UA Negative Negative    Leuk Esterase, UA Negative Negative    Nitrite, UA Negative Negative    Urobilinogen, UA 1.0 E.U./dL 0.2 - 1.0 E.U./dL   Pregnancy, Urine - Urine, Clean Catch    Specimen: Urine, Clean Catch   Result Value Ref Range    HCG, Urine QL Positive (A) Negative   ECG 12 Lead   Result Value Ref Range    QT Interval 328 ms    QTC Interval 433 ms   Light Blue Top   Result Value Ref Range    Extra Tube hold for add-on    Green Top (Gel)   Result Value Ref Range    Extra Tube Hold for add-ons.    Lavender Top   Result Value Ref Range    Extra Tube hold for add-on    Gold Top - SST   Result Value Ref Range    Extra Tube Hold for add-ons.         All other labs were within normal range or not returned as of this dictation.      EMERGENCY DEPARTMENT COURSE and DIFFERENTIAL DIAGNOSIS/MDM:   Vitals:    Vitals:    03/10/21 1839 03/10/21 2200   BP: 142/88    BP Location: Right arm    Patient Position: Sitting    Pulse: 111 96   Resp: 20    Temp: 99.2 °F (37.3 °C)    TempSrc: Oral    SpO2: 100% 100%   Weight: 116 kg (255 lb)    Height: 167.6 cm (66\")        ED Course as of Mar 12 0521   Wed Mar 10, 2021   2218 Patient reexamined and she is feeling much better after receiving IV fluids.  Her symptoms all seem to be orthostatic in nature which she has " had issues with in the past.  She has no symptoms at rest and no complaints of shortness of breath or chest pain.  Her laboratory evaluation is completely unremarkable and reveals no evidence of electrolyte derangement, myocardial injury, or CHF.  She does have some mild anemia in keeping with later pregnancy.  Some of her tachycardia may also be related to this.  She does have some mild leukocytosis, however has no infectious symptoms and there is no indication of an acute infectious or inflammatory process.  Her urinalysis and chest x-ray are clear.  Her vital signs in the emergency department are unremarkable apart from some mild tachycardia and her ECG demonstrates no evidence of dysrhythmia, ischemia, or other abnormality.  She was already evaluated in OB triage with a normal evaluation.  I have counseled her on remaining hydrated as well as following up with her primary care provider and OB/GYN as soon as possible.    [NS]      ED Course User Index  [NS] Casey Sam MD       Patient well-appearing and stable throughout the duration of her stay in the emergency department. There is no evidence of emergent pathology based on work-up. Patient appropriate for discharge home and close patient follow-up.    I had a discussion with the patient/family regarding diagnosis, diagnostic results, treatment plan, and medications.  The patient/family indicated understanding of these instructions.  I spent adequate time at the bedside preceding discharge necessary to personally discuss the aftercare instructions, giving patient education, providing explanations of the results of our evaluations/findings, and my decision making to assure that the patient/family understand the plan of care.  Time was allotted to answer questions at that time and throughout the ED course.  Emphasis was placed on timely follow-up after discharge.  I also discussed the potential for the development of an acute emergent condition requiring  further evaluation, admission, or even surgical intervention. I discussed that we found nothing during the visit today indicating the need for further workup, admission, or the presence of an unstable medical condition.  I encouraged the patient to return to the emergency department immediately for ANY concerns, worsening, new complaints, or if symptoms persist and unable to seek follow-up in a timely fashion.  The patient/family expressed understanding and agreement with this plan.  The patient will follow-up with their PCP in 1-2 days for reevaluation.           FINAL IMPRESSION      1. Tachycardia    2. Palpitations    3. 31 weeks gestation of pregnancy          DISPOSITION/PLAN     ED Disposition     ED Disposition Condition Comment    Discharge Stable           PATIENT REFERRED TO:  [facility] CARDIOLOGY  [address]  [place] [address] [phone]  Schedule an appointment as soon as possible for a visit in 2 days      Ashish Mejia MD  [address] [place] [address] [phone]    Schedule an appointment as soon as possible for a visit in 2 days      [facility] Emergency Department  [address] [address] [phone]    If symptoms worsen      DISCHARGE MEDICATIONS:     Medication List      CHANGE how you take these medications    polyethylene glycol 17 GM/SCOOP powder  Commonly known as: MIRALAX  Take 17 g by mouth Daily.  What changed:   · when to take this  · reasons to take this        CONTINUE taking these medications    cyclobenzaprine 5 MG tablet  Commonly known as: FLEXERIL     FLUoxetine 20 MG capsule  Commonly known as: PROzac  Take 1 capsule by mouth Daily.     ondansetron 4 MG tablet  Commonly known as: ZOFRAN     pantoprazole 40 MG EC tablet  Commonly known as: PROTONIX  Take 1 tablet by mouth Every Morning Before Breakfast.     Prenatal 27-1 27-1 MG tablet tablet                 Comment: Please note this report has been produced using speech recognition software.      Casey Sam MD  Attending Emergency Physician               Casey Sam MD  03/12/21 [id number]

## 2021-03-12 LAB
QT INTERVAL: 328 MS
QTC INTERVAL: 433 MS

## 2021-03-15 ENCOUNTER — ROUTINE PRENATAL (OUTPATIENT)
Dept: OBSTETRICS AND GYNECOLOGY | Facility: CLINIC | Age: 31
End: 2021-03-15

## 2021-03-15 ENCOUNTER — PREP FOR SURGERY (OUTPATIENT)
Dept: OTHER | Facility: HOSPITAL | Age: 31
End: 2021-03-15

## 2021-03-15 VITALS — SYSTOLIC BLOOD PRESSURE: 122 MMHG | WEIGHT: 256 LBS | BODY MASS INDEX: 41.32 KG/M2 | DIASTOLIC BLOOD PRESSURE: 82 MMHG

## 2021-03-15 DIAGNOSIS — Z98.891 HISTORY OF 2 CESAREAN SECTIONS: ICD-10-CM

## 2021-03-15 DIAGNOSIS — O09.40 ENCOUNTER FOR SUPERVISION OF HIGH RISK PREGNANCY WITH GRAND MULTIPARITY, ANTEPARTUM: ICD-10-CM

## 2021-03-15 DIAGNOSIS — O99.210 MATERNAL MORBID OBESITY, ANTEPARTUM (HCC): ICD-10-CM

## 2021-03-15 DIAGNOSIS — F33.0 MILD EPISODE OF RECURRENT MAJOR DEPRESSIVE DISORDER (HCC): ICD-10-CM

## 2021-03-15 DIAGNOSIS — Z98.891 HISTORY OF 2 CESAREAN SECTIONS: Primary | ICD-10-CM

## 2021-03-15 DIAGNOSIS — Z98.84 HISTORY OF BARIATRIC SURGERY: Primary | ICD-10-CM

## 2021-03-15 DIAGNOSIS — E66.01 MATERNAL MORBID OBESITY, ANTEPARTUM (HCC): ICD-10-CM

## 2021-03-15 DIAGNOSIS — Z3A.32 32 WEEKS GESTATION OF PREGNANCY: ICD-10-CM

## 2021-03-15 PROCEDURE — 99214 OFFICE O/P EST MOD 30 MIN: CPT | Performed by: OBSTETRICS & GYNECOLOGY

## 2021-03-15 RX ORDER — MISOPROSTOL 200 UG/1
800 TABLET ORAL AS NEEDED
Status: CANCELLED | OUTPATIENT
Start: 2021-03-15

## 2021-03-15 RX ORDER — OXYTOCIN-SODIUM CHLORIDE 0.9% IV SOLN 30 UNIT/500ML 30-0.9/5 UT/ML-%
650 SOLUTION INTRAVENOUS ONCE
Status: CANCELLED | OUTPATIENT
Start: 2021-03-15 | End: 2021-03-15

## 2021-03-15 RX ORDER — SODIUM CHLORIDE 0.9 % (FLUSH) 0.9 %
3 SYRINGE (ML) INJECTION EVERY 12 HOURS SCHEDULED
Status: CANCELLED | OUTPATIENT
Start: 2021-03-15

## 2021-03-15 RX ORDER — CEFAZOLIN SODIUM IN 0.9 % NACL 3 G/100 ML
3 INTRAVENOUS SOLUTION, PIGGYBACK (ML) INTRAVENOUS ONCE
Status: CANCELLED | OUTPATIENT
Start: 2021-03-15 | End: 2021-03-15

## 2021-03-15 RX ORDER — LIDOCAINE HYDROCHLORIDE 10 MG/ML
5 INJECTION, SOLUTION EPIDURAL; INFILTRATION; INTRACAUDAL; PERINEURAL AS NEEDED
Status: CANCELLED | OUTPATIENT
Start: 2021-03-15

## 2021-03-15 RX ORDER — OXYTOCIN-SODIUM CHLORIDE 0.9% IV SOLN 30 UNIT/500ML 30-0.9/5 UT/ML-%
85 SOLUTION INTRAVENOUS ONCE
Status: CANCELLED | OUTPATIENT
Start: 2021-03-15 | End: 2021-03-15

## 2021-03-15 RX ORDER — CARBOPROST TROMETHAMINE 250 UG/ML
250 INJECTION, SOLUTION INTRAMUSCULAR AS NEEDED
Status: CANCELLED | OUTPATIENT
Start: 2021-03-15

## 2021-03-15 RX ORDER — TRISODIUM CITRATE DIHYDRATE AND CITRIC ACID MONOHYDRATE 500; 334 MG/5ML; MG/5ML
30 SOLUTION ORAL ONCE
Status: CANCELLED | OUTPATIENT
Start: 2021-03-15 | End: 2021-03-15

## 2021-03-15 RX ORDER — SODIUM CHLORIDE 0.9 % (FLUSH) 0.9 %
10 SYRINGE (ML) INJECTION AS NEEDED
Status: CANCELLED | OUTPATIENT
Start: 2021-03-15

## 2021-03-15 RX ORDER — SODIUM CHLORIDE, SODIUM LACTATE, POTASSIUM CHLORIDE, CALCIUM CHLORIDE 600; 310; 30; 20 MG/100ML; MG/100ML; MG/100ML; MG/100ML
125 INJECTION, SOLUTION INTRAVENOUS CONTINUOUS
Status: CANCELLED | OUTPATIENT
Start: 2021-03-15

## 2021-03-15 RX ORDER — ACETAMINOPHEN 500 MG
1000 TABLET ORAL ONCE
Status: CANCELLED | OUTPATIENT
Start: 2021-03-15 | End: 2021-03-15

## 2021-03-15 RX ORDER — METHYLERGONOVINE MALEATE 0.2 MG/ML
200 INJECTION INTRAVENOUS ONCE AS NEEDED
Status: CANCELLED | OUTPATIENT
Start: 2021-03-15

## 2021-03-15 RX ORDER — KETOROLAC TROMETHAMINE 30 MG/ML
30 INJECTION, SOLUTION INTRAMUSCULAR; INTRAVENOUS ONCE
Status: CANCELLED | OUTPATIENT
Start: 2021-03-15 | End: 2021-03-15

## 2021-03-15 NOTE — PROGRESS NOTES
Chief Complaint   Patient presents with   • Routine Prenatal Visit     ob visit patient still having problems with increased heart rate appt with cardiologist on Thursday       HPI: Urszula is a  currently at 32w0d who today reports the following:Headache - No ; Visual change - No ; Swelling in legs - No ; Nausea - No ; Constipation - No; Diarrhea - No ; Contractions - No ; Leaking fluid - No ; Vaginal bleeding -  No.      ROS: Constitutional: negative for fever, chills, activity change, appetite change, fatigue and unexpected weight change.  Respiratory: negative for cough and dyspnea on exertion  Cardiovascular: positive for near-syncope and tachypnea, negative for syncope  Vitals: See prenatal flowsheet     EXAM:  Abdomen: See physician component of prenatal flowsheet   Urine glucose/protein: See physician component of prenatal flowsheet   Pelvic: See physician component of prenatal flowsheet     Prenatal Labs  Lab Results   Component Value Date    HGB 9.7 (L) 03/10/2021    RUBELLAABIGG Equivocal 10/09/2020    HEPBSAG Non-Reactive 10/09/2020    ABO A 10/09/2020    RH Positive 10/09/2020    ABSCRN Negative 10/09/2020    NZS0CBU2 Non-Reactive 10/09/2020    HEPCVIRUSABY Non-Reactive 10/09/2020    URINECX >100,000 CFU/mL Lactobacillus species (A) 10/09/2020       MDM:  Impression:Multiparous patient with medical complications, Previous C/S with anticipated , Obesity and post pariatric surgery. Symptomatic tachycardia, cardiology consult pending  Tests done today: none  Specific topics discussed at today's visit: Questions answered regarding COVID-19 and revision of office schedule of visits due to pandemic  Birth plan  Labor signs and symptoms  Maternal monitoring of fetal movement   labor signs and symptoms  Symptoms of preeclampsia  Next visit:U/S for EFW

## 2021-03-16 ENCOUNTER — PREP FOR SURGERY (OUTPATIENT)
Dept: OTHER | Facility: HOSPITAL | Age: 31
End: 2021-03-16

## 2021-03-17 ENCOUNTER — TELEPHONE (OUTPATIENT)
Dept: OBSTETRICS AND GYNECOLOGY | Facility: CLINIC | Age: 31
End: 2021-03-17

## 2021-03-17 NOTE — TELEPHONE ENCOUNTER
Patient called and scheduled R  on 2021 at 9:00 am Covid screen 5/3/21 at 9:00 at [facility] and PAT on 5/3/2021 10:00 am

## 2021-03-17 NOTE — PROGRESS NOTES
"Chief Complaint  Establish Care and Palpitations    Subjective    History of Present Illness {CC  Problem List  Visit  Diagnosis   Encounters  Notes  Medications  Labs  Result Review Imaging  Media :23}     Urszula Gonzalez presents to [facility] CARDIOLOGY for   History of Present Illness   30-year-old female who is currently 32 weeks pregnant who presents today for evaluation of tachycardia at the request of Dr. Sam.  Patient presented to the ED on 3/10 with positional tachycardia.  She reports that symptoms have been worse during her pregnancy but recently has progressed.  She reports a history of POTS and had similar issues with her last pregnancy earlier in the pregnancy.  Denies any chest pain, shortness of breath.  Work-up in ED was unremarkable except for some mild anemia.    She works as a hairdresser, she reports \"spells\" of breaking out in a sweat, feeling nauseous and then feel the urge to vomit. Symptoms started about a week ago. Has been unable to work. She started tracking her HRs and she says that her HR will increase to 150 just showering. Only happens when standing or walking. She says resting HRs are 60-70s.  She reports history of palpitations, but attributes these to anxiety.  She does feel the anxiety sometimes exacerbates her symptoms    She reports history of bradycardia when not pregnant. She reports that with her first 2 pregnancies she had symptoms only in her first trimester and was told it could be POTS and was advised to increase fluids and salt and symptoms resolved. This pregnancy has been much worse and has happened later in pregnancy. She has associated shortness of breath. No chest pain    She does have a history of gastric sleeve approximately 4 years ago    Drinks a lot of water, has tried salty snacks, Gatorade without improvement. Drinks 1 c of coffee a day    No cardiac complications during prior pregnancies  Objective     Vital Signs:   Vitals:    " "03/18/21 1117 03/18/21 1118 03/18/21 1119   BP: 118/74 114/75 119/75   BP Location: Right arm Left arm Left arm   Patient Position: Sitting Standing Sitting   Cuff Size: Large Adult Large Adult Large Adult   Pulse: 88 114 89   Resp:   20   Temp:   97.5 °F (36.4 °C)   TempSrc:   Temporal   SpO2: 98% 98% 98%   Weight:   116 kg (256 lb 6 oz)   Height:   167.6 cm (66\")     Body mass index is 41.38 kg/m².  Physical Exam  Vitals reviewed.   Constitutional:       Appearance: Normal appearance.   HENT:      Head: Normocephalic.   Eyes:      Extraocular Movements: Extraocular movements intact.      Pupils: Pupils are equal, round, and reactive to light.   Neck:      Vascular: No carotid bruit.   Cardiovascular:      Rate and Rhythm: Normal rate and regular rhythm.      Pulses: Normal pulses.      Heart sounds: Normal heart sounds, S1 normal and S2 normal. No murmur heard.     Pulmonary:      Effort: Pulmonary effort is normal. No respiratory distress.      Breath sounds: Normal breath sounds.   Chest:      Chest wall: No tenderness.   Abdominal:      General: Abdomen is flat. Bowel sounds are normal.      Palpations: Abdomen is soft.   Musculoskeletal:      Cervical back: Neck supple.      Right lower leg: No edema.      Left lower leg: No edema.   Skin:     General: Skin is warm and dry.   Neurological:      General: No focal deficit present.      Mental Status: She is alert and oriented to person, place, and time. Mental status is at baseline.   Psychiatric:         Mood and Affect: Mood normal.         Behavior: Behavior normal.         Thought Content: Thought content normal.              Result Review  Data Reviewed:{ Labs  Result Review  Imaging  Med Tab  Media :23}   ECG 12 Lead (03/10/2021 18:57)  Pregnancy, Urine - Urine, Clean Catch (03/10/2021 21:30)  Urinalysis With Microscopic If Indicated (No Culture) - Urine, Clean Catch (03/10/2021 21:30)  BNP (03/10/2021 18:58)  TSH (03/10/2021 18:58)  Troponin " (03/10/2021 18:58)  Magnesium (03/10/2021 18:58)  Comprehensive Metabolic Panel (03/10/2021 18:58)  CBC & Differential (03/10/2021 18:58)    Consultant notes Dr. Sam, Dr. Gutierrez            Assessment and Plan {CC Problem List  Visit Diagnosis  ROS  Review (Popup)  Health Maintenance  Quality  BestPractice  Medications  SmartSets  [employer] Encounters  Media :23}   1. Tachycardia  Orthostatic tachycardia likely exacerbated by pregnancy, anemia and hx of gastric sleeve  - Adult Transthoracic Echo Complete W/ Cont if Necessary Per Protocol; Future  - Holter Monitor - 72 Hour Up To 15 Days; Future  - Ambulatory Referral to Cardiology    2. Shortness of breath  - Adult Transthoracic Echo Complete W/ Cont if Necessary Per Protocol; Future  - Ambulatory Referral to Cardiology    3. 32 weeks gestation of pregnancy  - Adult Transthoracic Echo Complete W/ Cont if Necessary Per Protocol; Future  - Ambulatory Referral to Cardiology            Follow Up {Instructions Charge Capture  Follow-up Communications :23}   Return if symptoms worsen or fail to improve.    Patient was given instructions and counseling regarding her condition or for health maintenance advice. Please see specific information pulled into the AVS if appropriate.  Patient was instructed to call the [facility] with any questions, concerns, or worsening symptoms.    *Please note that portions of this note were completed with a voice recognition program. Efforts were made to edit the dictations, but occasionally words are mistranscribed.

## 2021-03-18 ENCOUNTER — HOSPITAL ENCOUNTER (OUTPATIENT)
Dept: CARDIOLOGY | Facility: HOSPITAL | Age: 31
Discharge: HOME | End: 2021-03-18

## 2021-03-18 ENCOUNTER — OFFICE VISIT (OUTPATIENT)
Dept: CARDIOLOGY | Facility: HOSPITAL | Age: 31
End: 2021-03-18

## 2021-03-18 VITALS
SYSTOLIC BLOOD PRESSURE: 119 MMHG | TEMPERATURE: 97.5 F | HEIGHT: 66 IN | DIASTOLIC BLOOD PRESSURE: 75 MMHG | RESPIRATION RATE: 20 BRPM | HEART RATE: 89 BPM | OXYGEN SATURATION: 98 % | WEIGHT: 256.38 LBS | BODY MASS INDEX: 41.2 KG/M2

## 2021-03-18 DIAGNOSIS — Z3A.32 32 WEEKS GESTATION OF PREGNANCY: ICD-10-CM

## 2021-03-18 DIAGNOSIS — R06.02 SHORTNESS OF BREATH: ICD-10-CM

## 2021-03-18 DIAGNOSIS — R00.0 TACHYCARDIA: Primary | ICD-10-CM

## 2021-03-18 DIAGNOSIS — R00.0 TACHYCARDIA: ICD-10-CM

## 2021-03-18 PROCEDURE — 93246 EXT ECG>7D<15D RECORDING: CPT

## 2021-03-18 PROCEDURE — 99204 OFFICE O/P NEW MOD 45 MIN: CPT | Performed by: NURSE PRACTITIONER

## 2021-03-18 NOTE — PROGRESS NOTES
[facility] Heart Monitor Documentation    Urszula Gonzalez  1990  [id number]  03/18/21    KERVIN Rice    [] ZIO XT Patch  Model [id number] Prescribed for N/A Days    · Serial Number: (N + 9 Digits) N   · Apply-By Date on Box:   · USPS Tracking Number:   · USPS Tracking        [] [employer]ian MINI PLUS Mobile Cardiac Telemetry  Model BGMINIPLUS Prescribed for N/A Days    · Serial Number: (BGM + 7 Digits) BGM  · Shipped-By Date on Box:   · UPS Tracking Number: [id number]  · UPS Tracking      [x] [employer] BodyGuardian MINI Holter Monitor  Model BGMINIEL Prescribed for 14 Days    · Serial Number: (7 Digits) [id number]  · Shipped-By Date on Box: 03/10/21  · UPS Tracking Number: [id number]  · UPS Tracking        This monitor was applied to the patient's chest and checked for proper functioning.  Ms. Urszula Gonzalez was instructed in the proper use of this monitor.  She was given the opportunity to ask questions and left the office with the device 's instruction manual.    Anita Whyte LPN, 11:46 EDT, 03/18/21                  [facility] 8.8.[facility]

## 2021-03-22 ENCOUNTER — HOSPITAL ENCOUNTER (OUTPATIENT)
Dept: CARDIOLOGY | Facility: HOSPITAL | Age: 31
Discharge: HOME | End: 2021-03-22
Admitting: NURSE PRACTITIONER

## 2021-03-22 VITALS
WEIGHT: 256 LBS | HEIGHT: 66 IN | DIASTOLIC BLOOD PRESSURE: 65 MMHG | SYSTOLIC BLOOD PRESSURE: 99 MMHG | BODY MASS INDEX: 41.14 KG/M2

## 2021-03-22 DIAGNOSIS — Z3A.32 32 WEEKS GESTATION OF PREGNANCY: ICD-10-CM

## 2021-03-22 DIAGNOSIS — R06.02 SHORTNESS OF BREATH: ICD-10-CM

## 2021-03-22 DIAGNOSIS — R00.0 TACHYCARDIA: ICD-10-CM

## 2021-03-22 LAB
ASCENDING AORTA: 2.7 CM
BH CV ECHO MEAS - AO MAX PG (FULL): 1.7 MMHG
BH CV ECHO MEAS - AO MAX PG: 5 MMHG
BH CV ECHO MEAS - AO MEAN PG (FULL): 1 MMHG
BH CV ECHO MEAS - AO MEAN PG: 3 MMHG
BH CV ECHO MEAS - AO ROOT AREA (BSA CORRECTED): 1.4
BH CV ECHO MEAS - AO ROOT AREA: 8 CM^2
BH CV ECHO MEAS - AO ROOT DIAM: 3.2 CM
BH CV ECHO MEAS - AO V2 MAX: 110 CM/SEC
BH CV ECHO MEAS - AO V2 MEAN: 74.3 CM/SEC
BH CV ECHO MEAS - AO V2 VTI: 20.3 CM
BH CV ECHO MEAS - ASC AORTA: 2.7 CM
BH CV ECHO MEAS - AVA(I,A): 2.9 CM^2
BH CV ECHO MEAS - AVA(I,D): 2.9 CM^2
BH CV ECHO MEAS - AVA(V,A): 2.6 CM^2
BH CV ECHO MEAS - AVA(V,D): 2.6 CM^2
BH CV ECHO MEAS - BSA(HAYCOCK): 2.4 M^2
BH CV ECHO MEAS - BSA: 2.2 M^2
BH CV ECHO MEAS - BZI_BMI: 41.3 KILOGRAMS/M^2
BH CV ECHO MEAS - BZI_METRIC_HEIGHT: 167.6 CM
BH CV ECHO MEAS - BZI_METRIC_WEIGHT: 116.1 KG
BH CV ECHO MEAS - EDV(CUBED): 86.9 ML
BH CV ECHO MEAS - EDV(MOD-SP4): 103 ML
BH CV ECHO MEAS - EDV(TEICH): 89.1 ML
BH CV ECHO MEAS - EF(CUBED): 63.4 %
BH CV ECHO MEAS - EF(MOD-SP4): 60.2 %
BH CV ECHO MEAS - EF(TEICH): 55.1 %
BH CV ECHO MEAS - ESV(CUBED): 31.9 ML
BH CV ECHO MEAS - ESV(MOD-SP4): 41 ML
BH CV ECHO MEAS - ESV(TEICH): 40 ML
BH CV ECHO MEAS - FS: 28.4 %
BH CV ECHO MEAS - IVS/LVPW: 1.1
BH CV ECHO MEAS - IVSD: 1.1 CM
BH CV ECHO MEAS - LAD MAJOR: 4.5 CM
BH CV ECHO MEAS - LAT PEAK E' VEL: 11.8 CM/SEC
BH CV ECHO MEAS - LATERAL E/E' RATIO: 7
BH CV ECHO MEAS - LV DIASTOLIC VOL/BSA (35-75): 46.4 ML/M^2
BH CV ECHO MEAS - LV MASS(C)D: 168.6 GRAMS
BH CV ECHO MEAS - LV MASS(C)DI: 75.9 GRAMS/M^2
BH CV ECHO MEAS - LV MAX PG: 3.3 MMHG
BH CV ECHO MEAS - LV MEAN PG: 2 MMHG
BH CV ECHO MEAS - LV SYSTOLIC VOL/BSA (12-30): 18.5 ML/M^2
BH CV ECHO MEAS - LV V1 MAX: 91.1 CM/SEC
BH CV ECHO MEAS - LV V1 MEAN: 64.9 CM/SEC
BH CV ECHO MEAS - LV V1 VTI: 18.6 CM
BH CV ECHO MEAS - LVIDD: 4.4 CM
BH CV ECHO MEAS - LVIDS: 3.2 CM
BH CV ECHO MEAS - LVLD AP4: 7.5 CM
BH CV ECHO MEAS - LVLS AP4: 6.2 CM
BH CV ECHO MEAS - LVOT AREA (M): 3.1 CM^2
BH CV ECHO MEAS - LVOT AREA: 3.1 CM^2
BH CV ECHO MEAS - LVOT DIAM: 2 CM
BH CV ECHO MEAS - LVPWD: 1.1 CM
BH CV ECHO MEAS - MED PEAK E' VEL: 8.7 CM/SEC
BH CV ECHO MEAS - MEDIAL E/E' RATIO: 9.6
BH CV ECHO MEAS - MV A MAX VEL: 62 CM/SEC
BH CV ECHO MEAS - MV DEC TIME: 0.1 SEC
BH CV ECHO MEAS - MV E MAX VEL: 82.8 CM/SEC
BH CV ECHO MEAS - MV E/A: 1.3
BH CV ECHO MEAS - MV MAX PG: 3.7 MMHG
BH CV ECHO MEAS - MV MEAN PG: 2 MMHG
BH CV ECHO MEAS - MV V2 MAX: 96.4 CM/SEC
BH CV ECHO MEAS - MV V2 MEAN: 63.1 CM/SEC
BH CV ECHO MEAS - MV V2 VTI: 28.8 CM
BH CV ECHO MEAS - MVA(VTI): 2 CM^2
BH CV ECHO MEAS - PA ACC SLOPE: 926.5 CM/SEC^2
BH CV ECHO MEAS - PA ACC TIME: 0.1 SEC
BH CV ECHO MEAS - PA MAX PG: 4.1 MMHG
BH CV ECHO MEAS - PA PR(ACCEL): 32.4 MMHG
BH CV ECHO MEAS - PA V2 MAX: 100.5 CM/SEC
BH CV ECHO MEAS - PI END-D VEL: 155 CM/SEC
BH CV ECHO MEAS - RVDD: 2.3 CM
BH CV ECHO MEAS - SI(AO): 73.5 ML/M^2
BH CV ECHO MEAS - SI(CUBED): 24.8 ML/M^2
BH CV ECHO MEAS - SI(LVOT): 26.3 ML/M^2
BH CV ECHO MEAS - SI(MOD-SP4): 27.9 ML/M^2
BH CV ECHO MEAS - SI(TEICH): 22.1 ML/M^2
BH CV ECHO MEAS - SV(AO): 163.3 ML
BH CV ECHO MEAS - SV(CUBED): 55.1 ML
BH CV ECHO MEAS - SV(LVOT): 58.4 ML
BH CV ECHO MEAS - SV(MOD-SP4): 62 ML
BH CV ECHO MEAS - SV(TEICH): 49.1 ML
BH CV ECHO MEAS - TAPSE (>1.6): 2.4 CM
BH CV ECHO MEAS - TV MAX PG: 2 MMHG
BH CV ECHO MEAS - TV V2 MAX: 71.3 CM/SEC
BH CV ECHO MEASUREMENTS AVERAGE E/E' RATIO: 8.08
BH CV VAS BP RIGHT ARM: NORMAL MMHG
BH CV XLRA - RV BASE: 3.3 CM
BH CV XLRA - RV LENGTH: 6.8 CM
BH CV XLRA - RV MID: 3.8 CM
BH CV XLRA - TDI S': 20.3 CM/SEC
LV EF 2D ECHO EST: 60 %
MAXIMAL PREDICTED HEART RATE: 191 BPM
STRESS TARGET HR: 162 BPM

## 2021-03-22 PROCEDURE — 93306 TTE W/DOPPLER COMPLETE: CPT | Performed by: INTERNAL MEDICINE

## 2021-03-22 PROCEDURE — 93306 TTE W/DOPPLER COMPLETE: CPT

## 2021-03-23 ENCOUNTER — BULK ORDERING (OUTPATIENT)
Dept: CASE MANAGEMENT | Facility: OTHER | Age: 31
End: 2021-03-23

## 2021-03-23 DIAGNOSIS — Z23 IMMUNIZATION DUE: ICD-10-CM

## 2021-03-29 ENCOUNTER — ROUTINE PRENATAL (OUTPATIENT)
Dept: OBSTETRICS AND GYNECOLOGY | Facility: CLINIC | Age: 31
End: 2021-03-29

## 2021-03-29 ENCOUNTER — OFFICE VISIT (OUTPATIENT)
Dept: OBSTETRICS AND GYNECOLOGY | Facility: HOSPITAL | Age: 31
End: 2021-03-29

## 2021-03-29 ENCOUNTER — HOSPITAL ENCOUNTER (OUTPATIENT)
Dept: WOMENS IMAGING | Facility: HOSPITAL | Age: 31
Discharge: HOME | End: 2021-03-29
Admitting: OBSTETRICS & GYNECOLOGY

## 2021-03-29 VITALS — BODY MASS INDEX: 41.64 KG/M2 | SYSTOLIC BLOOD PRESSURE: 120 MMHG | DIASTOLIC BLOOD PRESSURE: 69 MMHG | WEIGHT: 258 LBS

## 2021-03-29 VITALS — SYSTOLIC BLOOD PRESSURE: 120 MMHG | BODY MASS INDEX: 41.64 KG/M2 | WEIGHT: 258 LBS | DIASTOLIC BLOOD PRESSURE: 69 MMHG

## 2021-03-29 DIAGNOSIS — O99.210 MATERNAL MORBID OBESITY, ANTEPARTUM (HCC): ICD-10-CM

## 2021-03-29 DIAGNOSIS — Z3A.34 34 WEEKS GESTATION OF PREGNANCY: ICD-10-CM

## 2021-03-29 DIAGNOSIS — E66.01 MATERNAL MORBID OBESITY, ANTEPARTUM (HCC): ICD-10-CM

## 2021-03-29 DIAGNOSIS — Z98.891 HISTORY OF 2 CESAREAN SECTIONS: Primary | ICD-10-CM

## 2021-03-29 DIAGNOSIS — Z98.84 HISTORY OF BARIATRIC SURGERY: Primary | ICD-10-CM

## 2021-03-29 DIAGNOSIS — Z98.84 HISTORY OF BARIATRIC SURGERY: ICD-10-CM

## 2021-03-29 DIAGNOSIS — Z98.891 HISTORY OF 2 CESAREAN SECTIONS: ICD-10-CM

## 2021-03-29 DIAGNOSIS — F33.0 MILD EPISODE OF RECURRENT MAJOR DEPRESSIVE DISORDER (HCC): ICD-10-CM

## 2021-03-29 PROCEDURE — 76816 OB US FOLLOW-UP PER FETUS: CPT

## 2021-03-29 PROCEDURE — 76816 OB US FOLLOW-UP PER FETUS: CPT | Performed by: OBSTETRICS & GYNECOLOGY

## 2021-03-29 PROCEDURE — 99214 OFFICE O/P EST MOD 30 MIN: CPT | Performed by: OBSTETRICS & GYNECOLOGY

## 2021-03-29 NOTE — PROGRESS NOTES
Documentation of the ultasound findings, images, and interpretations will be available in the patient's Viewpoint report located in the Chart Review Imaging tab in [employer].

## 2021-03-29 NOTE — PROGRESS NOTES
Chief Complaint   Patient presents with   • Routine Prenatal Visit     ob visit with PDC appt today   • Pregnancy Ultrasound       HPI: Urszula is a  currently at 34w0d who today reports the following:Headache - No ; Visual change - No ; Swelling in legs - No ; Nausea - No ; Constipation - No; Diarrhea - No ; Contractions - No ; Leaking fluid - No ; Vaginal bleeding -  No.      ROS: Pertinent items are noted in HPI.  Vitals: See prenatal flowsheet     EXAM:  Abdomen: See physician component of prenatal flowsheet   Urine glucose/protein: See physician component of prenatal flowsheet   Pelvic: See physician component of prenatal flowsheet     Prenatal Labs  Lab Results   Component Value Date    HGB 9.7 (L) 03/10/2021    RUBELLAABIGG Equivocal 10/09/2020    HEPBSAG Non-Reactive 10/09/2020    ABO A 10/09/2020    RH Positive 10/09/2020    ABSCRN Negative 10/09/2020    [id number] Non-Reactive 10/09/2020    HEPCVIRUSABY Non-Reactive 10/09/2020    URINECX >100,000 CFU/mL Lactobacillus species (A) 10/09/2020       MDM:High Risk Pregnancy    Impression:Multiparous patient with medical complications, Previous C/S with planned repeat C/S, Obesity and is post bariatric surgery, stable from nutritional stanedpoint with normal fetal grwoth. MDD stable now on high risk medication Stil having episodes of SVT. Cardiology work up in progress  Tests done today: U/S to complete the anatomic screening  and U/S for EFW   Specific topics discussed at today's visit: Questions answered regarding COVID-19 and revision of office schedule of visits due to pandemic  Birth plan  Maternal monitoring of fetal movement   labor signs and symptoms  Symptoms of preeclampsia  Next visit:GBS testing

## 2021-04-07 ENCOUNTER — CONSULT (OUTPATIENT)
Dept: CARDIOLOGY | Facility: CLINIC | Age: 31
End: 2021-04-07

## 2021-04-07 VITALS
HEIGHT: 66 IN | OXYGEN SATURATION: 98 % | HEART RATE: 100 BPM | BODY MASS INDEX: 41.46 KG/M2 | WEIGHT: 258 LBS | DIASTOLIC BLOOD PRESSURE: 62 MMHG | SYSTOLIC BLOOD PRESSURE: 98 MMHG

## 2021-04-07 DIAGNOSIS — Z34.90 PREGNANCY, UNSPECIFIED GESTATIONAL AGE: ICD-10-CM

## 2021-04-07 DIAGNOSIS — I95.1 AUTONOMIC ORTHOSTATIC HYPOTENSION: Primary | ICD-10-CM

## 2021-04-07 DIAGNOSIS — R00.0 TACHYCARDIA: ICD-10-CM

## 2021-04-07 PROCEDURE — 93000 ELECTROCARDIOGRAM COMPLETE: CPT | Performed by: INTERNAL MEDICINE

## 2021-04-07 PROCEDURE — 99204 OFFICE O/P NEW MOD 45 MIN: CPT | Performed by: INTERNAL MEDICINE

## 2021-04-07 RX ORDER — FLUDROCORTISONE ACETATE 0.1 MG/1
0.1 TABLET ORAL 2 TIMES DAILY
Qty: 60 TABLET | Refills: 3 | Status: SHIPPED | OUTPATIENT
Start: 2021-04-07 | End: 2021-04-24 | Stop reason: HOSPADM

## 2021-04-07 RX ORDER — SODIUM CHLORIDE 1000 MG
1 TABLET, SOLUBLE MISCELLANEOUS 3 TIMES DAILY
Qty: 90 TABLET | Refills: 3 | Status: SHIPPED | OUTPATIENT
Start: 2021-04-07 | End: 2021-04-24 | Stop reason: HOSPADM

## 2021-04-07 NOTE — PROGRESS NOTES
[facility] Cardiology  [address], [address]  [place], [place], [address] [phone]  WWW[facility]           OUTPATIENT CLINIC CONSULTATION NOTE    Patient care team:  Patient Care Team:  Cha Fernández APRN as PCP - General (Family Medicine)    Requesting Provider and Reason for consultation: The patient is being seen today at the request of ELEONORA Luz* for orthostatic tachycardia, presyncope.     Subjective:   Chief complaint:   Chief Complaint   Patient presents with   • Palpitations       HPI:    Urszula Gonzalez is a 30 y.o. female.  Works as a hairdresser  Partial problem list, including cardiac problems:  1. Severe tachycardia, vagal like syndromes, pre-syncope during 3rd trimester of third pregnancy, 4/2021  2. POTS during second pregnancy  3. Mild HTN with first pregnancy, did not warrant pharmacologic therapy  4. Bradycardia  5. GERD  6. Ovarian cyst  7. Anxiety/depression  8. Morbid obesity    Today the patient presents for consultation.  The patient has the above history.  Over the last few weeks she has developed severe orthostatic-like symptoms.  Feels shaky, diaphoretic, lightheaded, occasional vision changes, vertigo.  Heart rate shoots to the 150s.  Started during the third trimester of her current pregnancy.  Had a pots-like syndrome during her second pregnancy and mild hypertension that did not warrant adequate therapy during her first pregnancy.  Normally her heart rate is low in the 50s to 60s.  Was found to have orthostatic tachycardia on different positional measurements at a recent visit in the heart and valve clinic.  Increasing her fluids has not helped significantly.  Increasing the salt in her diet has not helped significantly.  Is unable to perform her job as a hairdresser.  Has been off of work for the last couple weeks.      Denies having had these symptoms prior to pregnancy or after her previous pregnancies.  Weight has only  been up 7 pounds during this pregnancy.  No significant weight difference when compared to her second pregnancy.    Review of Systems:  Positive for orthostasis, diaphoresis, tachycardia/palpitations  All other systems are reviewed and are negative    PFSH:  Patient Active Problem List   Diagnosis   • Right upper quadrant abdominal pain   • Gastroesophageal reflux disease without esophagitis   • Bloating   • Dysfunctional gallbladder   • History of 2  sections   • 34 weeks gestation of pregnancy   • Encounter for supervision of high risk pregnancy with grand multiparity, antepartum   • History of bariatric surgery   • Mild episode of recurrent major depressive disorder (CMS/HCC)   • Maternal morbid obesity, antepartum (CMS/HCC)         Current Outpatient Medications:   •  FLUoxetine (PROzac) 20 MG capsule, Take 1 capsule by mouth Daily., Disp: 30 capsule, Rfl: 5  •  ondansetron (ZOFRAN) 4 MG tablet, Take 4 mg by mouth Every 8 (Eight) Hours As Needed for Nausea or Vomiting., Disp: , Rfl:   •  pantoprazole (PROTONIX) 40 MG EC tablet, Take 1 tablet by mouth Every Morning Before Breakfast., Disp: 30 tablet, Rfl: 11  •  polyethylene glycol (MIRALAX) powder, Take 17 g by mouth Daily. (Patient taking differently: Take 17 g by mouth Daily As Needed.), Disp: 510 g, Rfl: 2  •  Prenatal Vit-Fe Fumarate-FA (Prenatal 27-1) 27-1 MG tablet tablet, Take 1 tablet by mouth Daily., Disp: , Rfl:   •  fludrocortisone 0.1 MG tablet, Take 1 tablet by mouth 2 (Two) Times a Day., Disp: 60 tablet, Rfl: 3  •  sodium chloride 1 g tablet, Take 1 tablet by mouth 3 (Three) Times a Day., Disp: 90 tablet, Rfl: 3    Allergies   Allergen Reactions   • Ciprofloxacin Hives       Social History     Socioeconomic History   • Marital status:      Spouse name: nate gary   • Number of children: 2   • Years of education: Not on file   • Highest education level: High school graduate   Tobacco Use   • Smoking status: Never Smoker   •  "Smokeless tobacco: Never Used   Vaping Use   • Vaping Use: Never used   Substance and Sexual Activity   • Alcohol use: Not Currently     Comment: social   • Drug use: Never   • Sexual activity: Defer     Partners: Male     Birth control/protection: None     Family History   Problem Relation Age of Onset   • Thyroid disease Mother    • Graves' disease Mother    • Myasthenia gravis Mother    • No Known Problems Sister    • Hypertension Father    • Hypertension Brother    • Diabetes Maternal Grandmother    • Cirrhosis Maternal Grandmother    • Cancer Maternal Grandmother    • No Known Problems Paternal Grandmother    • Cancer Paternal Grandfather    • Hypertension Brother    • No Known Problems Son    • No Known Problems Daughter          Objective:   Physical Exam:  BP 98/62   Pulse 100   Ht 167.6 cm (65.98\")   Wt 117 kg (258 lb)   LMP 08/03/2020   SpO2 98%   BMI 41.66 kg/m²   CONSTITUTIONAL: Well-nourished. In no acute distress.   SKIN: Warm and dry. No rashes noted  HEENT: Head is normocephalic and atraumatic. Pupils are equal and reactive to light bilaterally.   NECK: Supple without masses or thyromegaly. There is no jugular venous distention   LUNGS: Normal effort. Clear to auscultation bilaterally without wheezing, rhonchi, or rales noted.   CARDIOVASCULAR: Regular rate and rhythm with a normal S1 and S2. There is no murmur, gallop, rub, or click appreciated. Carotid upstrokes are 2+ and symmetrical without bruits.  2+ radial pulses bilaterally.  There is no peripheral edema.   ABDOMEN: Normal bowel sounds.  Nondistended.  MUSCULOSKELETAL:  No digital cyanosis  NEUROLOGICAL: Nonfocal.  PSYCHIATRIC: Alert, orientated x 3, appropriate affect and mood    4/7/2021 exam: Lying 110/78 HR 84, sitting 102/70 , standing 90/50     Labs:  BUN   Date Value Ref Range Status   03/10/2021 7 6 - 20 mg/dL Final     Creatinine   Date Value Ref Range Status   03/10/2021 0.55 (L) 0.57 - 1.00 mg/dL Final "     Potassium   Date Value Ref Range Status   03/10/2021 3.4 (L) 3.5 - 5.2 mmol/L Final     ALT (SGPT)   Date Value Ref Range Status   03/10/2021 7 1 - 33 U/L Final     AST (SGOT)   Date Value Ref Range Status   03/10/2021 12 1 - 32 U/L Final     WBC   Date Value Ref Range Status   03/10/2021 14.43 (H) 3.40 - 10.80 10*3/mm3 Final     Hemoglobin   Date Value Ref Range Status   03/10/2021 9.7 (L) 12.0 - 15.9 g/dL Final     Hematocrit   Date Value Ref Range Status   03/10/2021 31.4 (L) 34.0 - 46.6 % Final     Platelets   Date Value Ref Range Status   03/10/2021 285 140 - 450 10*3/mm3 Final       No results found for: CHOL  No results found for: TRIG  No results found for: HDL  No results found for: LDL  No components found for: LDLDIRECTC    Diagnostic Data:      ECG 12 Lead    Date/Time: 4/7/2021 5:42 PM  Performed by: Rafiq Martinez MD  Authorized by: Rafiq Martinez MD   Comparison: compared with previous ECG from 3/10/2021  Similar to previous ECG  Rhythm: sinus rhythm  Comments: LP FB likely due to lead placement, heart rate 91, QRS 97, QTc 377            Results for orders placed during the hospital encounter of 03/22/21    Adult Transthoracic Echo Complete W/ Cont if Necessary Per Protocol    Interpretation Summary  · Estimated left ventricular EF = 60% Left ventricular ejection fraction appears to be 56 - 60%. Left ventricular systolic function is normal.  · Left ventricular diastolic function was normal.  · Normal right ventricular cavity size, wall thickness, systolic function and septal motion noted.  · No evidence of pulmonary hypertension is present.  · There is no evidence of pericardial effusion.  · No significant structural or functional valvular abnormality demonstrated.      Assessment and Plan:   Diagnoses and all orders for this visit:    Autonomic orthostatic hypotension and orthostatic tachycardia  Pregnancy, unspecified gestational age  Tachycardia  -Recent lab work unremarkable other than anemia,  which may be a contributing factor to the patient's clinical syndrome. Will reach out to patient's PCP to consider options re: anemia.  -Has evidence of orthostatic hypotension on today's exam and orthostatic tachycardia on a prior Heart and Valve clinic visit  -Recommend the patient drink at least 64 ounces of fluid a day, encouraged drinking either water or fluids with electrolytes.  Advised to be cautious of drinking sports drinks with added sugar  -Recommended taking salt tablets, 1 g, 3 times daily, with meals  -Trial of Florinef 0.1 mg twice daily.  Patient to take her blood pressure morning and evening.  We will call her in 1 week to check on how her blood pressure is doing.  Discussed the risks and benefits of taking Florinef in the setting of her third trimester pregnancy.  Risks to the fetus is low.  Goal would be to discontinue Florinef after delivery  -Advised restricted activity to activities of daily living due to significant exertional symptoms  -If the patient symptoms continue to progress and worsen, as they have been over the last couple weeks, will need to consider additional limitation of the patient's activities versus consideration of early delivery    - Return in about 2 weeks (around 4/21/2021) for Next scheduled follow up with Hina Cheung or Dr Martinez, repeat orthostatic blood pressures.    Rafiq Martinez MD, MSc, FACC, [facility]  Interventional Cardiology  [facility]

## 2021-04-08 ENCOUNTER — TELEPHONE (OUTPATIENT)
Dept: CARDIOLOGY | Facility: CLINIC | Age: 31
End: 2021-04-08

## 2021-04-08 PROCEDURE — 93248 EXT ECG>7D<15D REV&INTERPJ: CPT | Performed by: INTERNAL MEDICINE

## 2021-04-08 NOTE — TELEPHONE ENCOUNTER
Spoke with MA @ [facility], KERVIN Tom regarding recent anemia, labs, and orthostatic symptoms.       Will fax office note to Dr. Gutierrez.

## 2021-04-08 NOTE — TELEPHONE ENCOUNTER
----- Message from Rafiq Martinez MD sent at 4/7/2021  5:40 PM EDT -----  Can you send a message to the patient's PCP asking for them to review her recent anemia that we have noted, hemoglobin of 9.7 last month, in light of her orthostatic symptoms?  They may already be aware.    Can you also send a copy of my note to the patient's obstetrician, Dr. Nelson Gutierrez.      Thanks

## 2021-04-14 ENCOUNTER — TELEPHONE (OUTPATIENT)
Dept: CARDIOLOGY | Facility: CLINIC | Age: 31
End: 2021-04-14

## 2021-04-14 ENCOUNTER — ROUTINE PRENATAL (OUTPATIENT)
Dept: OBSTETRICS AND GYNECOLOGY | Facility: CLINIC | Age: 31
End: 2021-04-14

## 2021-04-14 VITALS — WEIGHT: 261 LBS | SYSTOLIC BLOOD PRESSURE: 120 MMHG | BODY MASS INDEX: 42.15 KG/M2 | DIASTOLIC BLOOD PRESSURE: 60 MMHG

## 2021-04-14 DIAGNOSIS — F33.0 MILD EPISODE OF RECURRENT MAJOR DEPRESSIVE DISORDER (HCC): ICD-10-CM

## 2021-04-14 DIAGNOSIS — Z98.84 HISTORY OF BARIATRIC SURGERY: ICD-10-CM

## 2021-04-14 DIAGNOSIS — Z98.891 HISTORY OF 2 CESAREAN SECTIONS: ICD-10-CM

## 2021-04-14 DIAGNOSIS — Z3A.36 36 WEEKS GESTATION OF PREGNANCY: Primary | ICD-10-CM

## 2021-04-14 LAB — EXTERNAL GROUP B STREP ANTIGEN: NORMAL

## 2021-04-14 PROCEDURE — 99214 OFFICE O/P EST MOD 30 MIN: CPT | Performed by: OBSTETRICS & GYNECOLOGY

## 2021-04-14 NOTE — TELEPHONE ENCOUNTER
Patient called to update on BP/HR symptoms. Patients BP averaging 122/76 HR ranging from . Patient reports Florinef and salt tablets are not helping symptoms. She has an appt with Dr. Gutierrez today.       Of note- scheduling is going to put her on the schedule for a follow up for 4/21.      Would you like to make any changes in the mean time.

## 2021-04-14 NOTE — PROGRESS NOTES
Chief Complaint   Patient presents with   • Routine Prenatal Visit     ob visit needs gbs culture   seen cardiologist on 2021 regarding heart rate       HPI: Urszula is a  currently at 36w2d who today reports the following:Headache - No ; Visual change - No ; Swelling in legs - No ; Nausea - No ; Constipation - No; Diarrhea - No ; Contractions - No ; Leaking fluid - No ; Vaginal bleeding -  No.      ROS: Constitutional: positive for malaise  Cardiovascular: positive for tachypnea  Vitals: See prenatal flowsheet     EXAM:  Abdomen: See physician component of prenatal flowsheet   Urine glucose/protein: See physician component of prenatal flowsheet   Pelvic: See physician component of prenatal flowsheet     Prenatal Labs  Lab Results   Component Value Date    HGB 9.7 (L) 03/10/2021    RUBELLAABIGG Equivocal 10/09/2020    HEPBSAG Non-Reactive 10/09/2020    ABO A 10/09/2020    RH Positive 10/09/2020    ABSCRN Negative 10/09/2020    [id number] Non-Reactive 10/09/2020    HEPCVIRUSABY Non-Reactive 10/09/2020    URINECX >100,000 CFU/mL Lactobacillus species (A) 10/09/2020       MDM:  Impression:Multiparous patient with medical complications, Previous C/S with planned repeat C/S, Obesity and MDD on medication. Symptomatic tachycardia neg work up to date  Tests done today: GBS  Specific topics discussed at today's visit: Questions answered regarding COVID-19 and revision of office schedule of visits due to pandemic  Birth plan  Next visit:none

## 2021-04-19 ENCOUNTER — OFFICE VISIT (OUTPATIENT)
Dept: CARDIOLOGY | Facility: CLINIC | Age: 31
End: 2021-04-19

## 2021-04-19 ENCOUNTER — ROUTINE PRENATAL (OUTPATIENT)
Dept: OBSTETRICS AND GYNECOLOGY | Facility: CLINIC | Age: 31
End: 2021-04-19

## 2021-04-19 VITALS — SYSTOLIC BLOOD PRESSURE: 122 MMHG | DIASTOLIC BLOOD PRESSURE: 78 MMHG | WEIGHT: 262.6 LBS | BODY MASS INDEX: 42.41 KG/M2

## 2021-04-19 VITALS — BODY MASS INDEX: 42.11 KG/M2 | HEIGHT: 66 IN | OXYGEN SATURATION: 98 % | WEIGHT: 262 LBS

## 2021-04-19 DIAGNOSIS — R00.0 TACHYCARDIA: ICD-10-CM

## 2021-04-19 DIAGNOSIS — I95.1 AUTONOMIC ORTHOSTATIC HYPOTENSION: Primary | ICD-10-CM

## 2021-04-19 DIAGNOSIS — Z98.891 HISTORY OF 2 CESAREAN SECTIONS: Primary | ICD-10-CM

## 2021-04-19 PROCEDURE — 99213 OFFICE O/P EST LOW 20 MIN: CPT | Performed by: OBSTETRICS & GYNECOLOGY

## 2021-04-19 PROCEDURE — 99214 OFFICE O/P EST MOD 30 MIN: CPT | Performed by: INTERNAL MEDICINE

## 2021-04-19 NOTE — PROGRESS NOTES
Chief Complaint   Patient presents with   • Routine Prenatal Visit     saw cardiology and states that they recommend am earlier delivery       HPI: Urszula is a  currently at 37w0d who today reports the following:Headache - No ; Visual change - No ; Swelling in legs - No ; Nausea - No ; Constipation - No; Diarrhea - No ; Contractions - No ; Leaking fluid - No ; Vaginal bleeding -  No.      ROS: Pertinent items are noted in HPI.  Vitals: See prenatal flowsheet     EXAM:  Abdomen: See physician component of prenatal flowsheet   Urine glucose/protein: See physician component of prenatal flowsheet   Pelvic: Not performed today     Prenatal Labs  Lab Results   Component Value Date    HGB 9.7 (L) 03/10/2021    RUBELLAABIGG Equivocal 10/09/2020    HEPBSAG Non-Reactive 10/09/2020    ABO A 10/09/2020    RH Positive 10/09/2020    ABSCRN Negative 10/09/2020    [id number] Non-Reactive 10/09/2020    HEPCVIRUSABY Non-Reactive 10/09/2020    URINECX >100,000 CFU/mL Lactobacillus species (A) 10/09/2020       MDM: High Risk Pregnancy  Impression:Multiparous patient with medical complications, Previous C/S with planned repeat C/S, Obesity and MDD stable on high risk medication.  Paroxysmal tachycardia.   Tests done today: none  Specific topics discussed at today's visit: Questions answered regarding COVID-19 and revision of office schedule of visits due to pandemic  Birth plan  Next visit:PDC consult re delivery timing given recommendation of cardiologist

## 2021-04-19 NOTE — PROGRESS NOTES
[facility] Cardiology  [address], [address]  [place], [place], [address]    [phone]  [facility]           OUTPATIENT CLINIC PROGRESS NOTE    Patient care team:  Patient Care Team:  Cha Fernández APRN as PCP - General (Family Medicine)      Subjective:   Chief complaint:   Chief Complaint   Patient presents with   • Orthostatic hypotention       HPI:    Urszula Gonzalez is a 30 y.o. female.  Works as a hairdresser  Partial problem list, including cardiac problems:  1. Orthostatic tachycardia and orthostatic hypotension   a. Vagal like syndrome, pre-syncope during 3rd trimester of third pregnancy, 2021  2. POTS during second pregnancy  3. Mild HTN with first pregnancy, did not warrant pharmacologic therapy  4. Bradycardia  5. GERD  6. Ovarian cyst  7. Anxiety/depression  8. Morbid obesity    Today the patient presents for follow-up.    Since starting Florinef and salt tablets, blood pressure has been more stable but heart rate still jumps to the 170s with minimal activity.  Today heart rate went to 145 with standing.  Still having presyncopal-like episodes, blurry vision with standing.  Ongoing palpitations and dyspnea    Review of Systems:  Positive for orthostasis, diaphoresis, tachycardia/palpitations    PFSH:  Patient Active Problem List   Diagnosis   • Right upper quadrant abdominal pain   • Gastroesophageal reflux disease without esophagitis   • Bloating   • Dysfunctional gallbladder   • History of 2  sections   • 36 weeks gestation of pregnancy   • Encounter for supervision of high risk pregnancy with grand multiparity, antepartum   • History of bariatric surgery   • Mild episode of recurrent major depressive disorder (CMS/HCC)   • Maternal morbid obesity, antepartum (CMS/HCC)         Current Outpatient Medications:   •  fludrocortisone 0.1 MG tablet, Take 1 tablet by mouth 2 (Two) Times a Day., Disp: 60 tablet, Rfl: 3  •  FLUoxetine (PROzac) 20 MG  "capsule, Take 1 capsule by mouth Daily., Disp: 30 capsule, Rfl: 5  •  ondansetron (ZOFRAN) 4 MG tablet, Take 4 mg by mouth Every 8 (Eight) Hours As Needed for Nausea or Vomiting., Disp: , Rfl:   •  pantoprazole (PROTONIX) 40 MG EC tablet, Take 1 tablet by mouth Every Morning Before Breakfast., Disp: 30 tablet, Rfl: 11  •  polyethylene glycol (MIRALAX) powder, Take 17 g by mouth Daily. (Patient taking differently: Take 17 g by mouth Daily As Needed.), Disp: 510 g, Rfl: 2  •  Prenatal Vit-Fe Fumarate-FA (Prenatal 27-1) 27-1 MG tablet tablet, Take 1 tablet by mouth Daily., Disp: , Rfl:   •  sodium chloride 1 g tablet, Take 1 tablet by mouth 3 (Three) Times a Day., Disp: 90 tablet, Rfl: 3    Allergies   Allergen Reactions   • Ciprofloxacin Hives       Social History     Socioeconomic History   • Marital status:      Spouse name: nate gary   • Number of children: 2   • Years of education: Not on file   • Highest education level: High school graduate   Tobacco Use   • Smoking status: Never Smoker   • Smokeless tobacco: Never Used   Vaping Use   • Vaping Use: Never used   Substance and Sexual Activity   • Alcohol use: Not Currently     Comment: social   • Drug use: Never   • Sexual activity: Defer     Partners: Male     Birth control/protection: None     Family History   Problem Relation Age of Onset   • Thyroid disease Mother    • Graves' disease Mother    • Myasthenia gravis Mother    • No Known Problems Sister    • Hypertension Father    • Hypertension Brother    • Diabetes Maternal Grandmother    • Cirrhosis Maternal Grandmother    • Cancer Maternal Grandmother    • No Known Problems Paternal Grandmother    • Cancer Paternal Grandfather    • Hypertension Brother    • No Known Problems Son    • No Known Problems Daughter          Objective:   Physical Exam:  Ht 167.6 cm (65.98\")   Wt 119 kg (262 lb)   LMP 08/03/2020   SpO2 98%   BMI 42.31 kg/m²      CONSTITUTIONAL: No acute distress  CARDIOVASCULAR: " Regular rate and rhythm with normal S1 and S2. Without murmur, gallop or rub.  PSYCH: Normal affect and mood    4/19/21 exam: Lying 118/74 , sitting 108/76 , standing 112/88   4/7/2021 exam: Lying 110/78 HR 84, sitting 102/70 , standing 90/50     Labs:  BUN   Date Value Ref Range Status   03/10/2021 7 6 - 20 mg/dL Final     Creatinine   Date Value Ref Range Status   03/10/2021 0.55 (L) 0.57 - 1.00 mg/dL Final     Potassium   Date Value Ref Range Status   03/10/2021 3.4 (L) 3.5 - 5.2 mmol/L Final     ALT (SGPT)   Date Value Ref Range Status   03/10/2021 7 1 - 33 U/L Final     AST (SGOT)   Date Value Ref Range Status   03/10/2021 12 1 - 32 U/L Final     WBC   Date Value Ref Range Status   03/10/2021 14.43 (H) 3.40 - 10.80 10*3/mm3 Final     Hemoglobin   Date Value Ref Range Status   03/10/2021 9.7 (L) 12.0 - 15.9 g/dL Final     Hematocrit   Date Value Ref Range Status   03/10/2021 31.4 (L) 34.0 - 46.6 % Final     Platelets   Date Value Ref Range Status   03/10/2021 285 140 - 450 10*3/mm3 Final       No results found for: CHOL  No results found for: TRIG  No results found for: HDL  No results found for: LDL  No components found for: LDLDIRECTC    Diagnostic Data:    Procedures    Results for orders placed during the hospital encounter of 03/22/21    Adult Transthoracic Echo Complete W/ Cont if Necessary Per Protocol    Interpretation Summary  · Estimated left ventricular EF = 60% Left ventricular ejection fraction appears to be 56 - 60%. Left ventricular systolic function is normal.  · Left ventricular diastolic function was normal.  · Normal right ventricular cavity size, wall thickness, systolic function and septal motion noted.  · No evidence of pulmonary hypertension is present.  · There is no evidence of pericardial effusion.  · No significant structural or functional valvular abnormality demonstrated.      Assessment and Plan:   Diagnoses and all orders for this visit:    Autonomic  orthostatic hypotension and orthostatic tachycardia  Pregnancy, unspecified gestational age  Tachycardia  -Symptoms persisting, possibly progressive, patient activity level very limited due to tachycardia and presyncopal feelings  -Continue salt tablets and Florinef for now.  Risk to fetus is low  -If safe from a fetal standpoint, would recommend earlier delivery  -If safer to delay delivery a little bit longer, will consider adding digoxin  -Patient to see obstetrician later today  -Message left with obstetrics office, asked for them to call me back today to discuss  -Advised restricted activity to activities of daily living due to significant exertional symptoms    -Postpartum, if symptoms stable, can discontinue Florinef 1 week after delivery    - No follow-ups on file.    Rafiq Martinez MD, MSc, FACC, [facility]  Interventional Cardiology  [facility]

## 2021-04-21 ENCOUNTER — HOSPITAL ENCOUNTER (OUTPATIENT)
Dept: WOMENS IMAGING | Facility: HOSPITAL | Age: 31
Discharge: HOME | End: 2021-04-21
Admitting: OBSTETRICS & GYNECOLOGY

## 2021-04-21 ENCOUNTER — OFFICE VISIT (OUTPATIENT)
Dept: OBSTETRICS AND GYNECOLOGY | Facility: HOSPITAL | Age: 31
End: 2021-04-21

## 2021-04-21 ENCOUNTER — HOSPITAL ENCOUNTER (INPATIENT)
Facility: HOSPITAL | Age: 31
LOS: 3 days | Discharge: HOME | End: 2021-04-24
Attending: OBSTETRICS & GYNECOLOGY | Admitting: OBSTETRICS & GYNECOLOGY

## 2021-04-21 ENCOUNTER — ROUTINE PRENATAL (OUTPATIENT)
Dept: OBSTETRICS AND GYNECOLOGY | Facility: CLINIC | Age: 31
End: 2021-04-21

## 2021-04-21 VITALS — BODY MASS INDEX: 42.47 KG/M2 | WEIGHT: 263 LBS | DIASTOLIC BLOOD PRESSURE: 68 MMHG | SYSTOLIC BLOOD PRESSURE: 120 MMHG

## 2021-04-21 VITALS — DIASTOLIC BLOOD PRESSURE: 82 MMHG | BODY MASS INDEX: 42.47 KG/M2 | WEIGHT: 263 LBS | SYSTOLIC BLOOD PRESSURE: 111 MMHG

## 2021-04-21 DIAGNOSIS — O99.210 MATERNAL MORBID OBESITY, ANTEPARTUM (HCC): ICD-10-CM

## 2021-04-21 DIAGNOSIS — Z98.84 HISTORY OF BARIATRIC SURGERY: ICD-10-CM

## 2021-04-21 DIAGNOSIS — Z98.891 HISTORY OF 2 CESAREAN SECTIONS: Primary | ICD-10-CM

## 2021-04-21 DIAGNOSIS — Z98.891 HISTORY OF 2 CESAREAN SECTIONS: ICD-10-CM

## 2021-04-21 DIAGNOSIS — Z3A.37 37 WEEKS GESTATION OF PREGNANCY: ICD-10-CM

## 2021-04-21 DIAGNOSIS — E66.01 MATERNAL MORBID OBESITY, ANTEPARTUM (HCC): ICD-10-CM

## 2021-04-21 LAB
ACCELERATIONS > 10BPM: 4
ACCELERATIONS > 15 BPM: 3
ACOUSTIC STIMULATION: NO
ALP SERPL-CCNC: 103 U/L (ref 39–117)
ALT SERPL W P-5'-P-CCNC: 6 U/L (ref 1–33)
AST SERPL-CCNC: 11 U/L (ref 1–32)
BILIRUB SERPL-MCNC: 0.3 MG/DL (ref 0–1.2)
CREAT SERPL-MCNC: 0.6 MG/DL (ref 0.57–1)
DECELERATIONS: NORMAL
DEPRECATED RDW RBC AUTO: 50.2 FL (ref 37–54)
ERYTHROCYTE [DISTWIDTH] IN BLOOD BY AUTOMATED COUNT: 17.4 % (ref 12.3–15.4)
FHR VARIABILITIES: NORMAL
GLUCOSE BLDC GLUCOMTR-MCNC: 125 MG/DL (ref 70–130)
HCT VFR BLD AUTO: 31.7 % (ref 34–46.6)
HGB BLD-MCNC: 9.3 G/DL (ref 12–15.9)
LDH SERPL-CCNC: 130 U/L (ref 135–214)
MCH RBC QN AUTO: 23.3 PG (ref 26.6–33)
MCHC RBC AUTO-ENTMCNC: 29.3 G/DL (ref 31.5–35.7)
MCV RBC AUTO: 79.4 FL (ref 79–97)
NST ASSESSMENT: REACTIVE
NST BASELINE: 131
NST DURATION: 18 MINUTES
NST INDICATIONS: NORMAL
NST LOCATIONS: NORMAL
NST READ BY: NORMAL
NST RETURN: NORMAL
PLATELET # BLD AUTO: 269 10*3/MM3 (ref 140–450)
PMV BLD AUTO: 10 FL (ref 6–12)
RBC # BLD AUTO: 3.99 10*6/MM3 (ref 3.77–5.28)
URATE SERPL-MCNC: 4.7 MG/DL (ref 2.4–5.7)
UTERINE ACTIVITY: NO
WBC # BLD AUTO: 11.53 10*3/MM3 (ref 3.4–10.8)

## 2021-04-21 PROCEDURE — 59025 FETAL NON-STRESS TEST: CPT | Performed by: OBSTETRICS & GYNECOLOGY

## 2021-04-21 PROCEDURE — 87635 SARS-COV-2 COVID-19 AMP PRB: CPT | Performed by: OBSTETRICS & GYNECOLOGY

## 2021-04-21 PROCEDURE — 99212 OFFICE O/P EST SF 10 MIN: CPT | Performed by: OBSTETRICS & GYNECOLOGY

## 2021-04-21 PROCEDURE — 76816 OB US FOLLOW-UP PER FETUS: CPT | Performed by: OBSTETRICS & GYNECOLOGY

## 2021-04-21 PROCEDURE — 85027 COMPLETE CBC AUTOMATED: CPT | Performed by: OBSTETRICS & GYNECOLOGY

## 2021-04-21 PROCEDURE — 82962 GLUCOSE BLOOD TEST: CPT

## 2021-04-21 PROCEDURE — 84550 ASSAY OF BLOOD/URIC ACID: CPT | Performed by: OBSTETRICS & GYNECOLOGY

## 2021-04-21 PROCEDURE — 59025 FETAL NON-STRESS TEST: CPT

## 2021-04-21 PROCEDURE — 84460 ALANINE AMINO (ALT) (SGPT): CPT | Performed by: OBSTETRICS & GYNECOLOGY

## 2021-04-21 PROCEDURE — 76819 FETAL BIOPHYS PROFIL W/O NST: CPT | Performed by: OBSTETRICS & GYNECOLOGY

## 2021-04-21 PROCEDURE — 86900 BLOOD TYPING SEROLOGIC ABO: CPT | Performed by: OBSTETRICS & GYNECOLOGY

## 2021-04-21 PROCEDURE — 86901 BLOOD TYPING SEROLOGIC RH(D): CPT | Performed by: OBSTETRICS & GYNECOLOGY

## 2021-04-21 PROCEDURE — 86850 RBC ANTIBODY SCREEN: CPT | Performed by: OBSTETRICS & GYNECOLOGY

## 2021-04-21 PROCEDURE — 82565 ASSAY OF CREATININE: CPT | Performed by: OBSTETRICS & GYNECOLOGY

## 2021-04-21 PROCEDURE — 82247 BILIRUBIN TOTAL: CPT | Performed by: OBSTETRICS & GYNECOLOGY

## 2021-04-21 PROCEDURE — 83615 LACTATE (LD) (LDH) ENZYME: CPT | Performed by: OBSTETRICS & GYNECOLOGY

## 2021-04-21 PROCEDURE — 76819 FETAL BIOPHYS PROFIL W/O NST: CPT

## 2021-04-21 PROCEDURE — 76816 OB US FOLLOW-UP PER FETUS: CPT

## 2021-04-21 PROCEDURE — 99221 1ST HOSP IP/OBS SF/LOW 40: CPT | Performed by: OBSTETRICS & GYNECOLOGY

## 2021-04-21 PROCEDURE — 84450 TRANSFERASE (AST) (SGOT): CPT | Performed by: OBSTETRICS & GYNECOLOGY

## 2021-04-21 PROCEDURE — 84075 ASSAY ALKALINE PHOSPHATASE: CPT | Performed by: OBSTETRICS & GYNECOLOGY

## 2021-04-21 RX ORDER — ACETAMINOPHEN 500 MG
1000 TABLET ORAL ONCE
Status: COMPLETED | OUTPATIENT
Start: 2021-04-21 | End: 2021-04-22

## 2021-04-21 RX ORDER — TRISODIUM CITRATE DIHYDRATE AND CITRIC ACID MONOHYDRATE 500; 334 MG/5ML; MG/5ML
30 SOLUTION ORAL ONCE
Status: COMPLETED | OUTPATIENT
Start: 2021-04-21 | End: 2021-04-22

## 2021-04-21 RX ORDER — LIDOCAINE HYDROCHLORIDE 10 MG/ML
5 INJECTION, SOLUTION EPIDURAL; INFILTRATION; INTRACAUDAL; PERINEURAL AS NEEDED
Status: DISCONTINUED | OUTPATIENT
Start: 2021-04-21 | End: 2021-04-22 | Stop reason: HOSPADM

## 2021-04-21 RX ORDER — SODIUM CHLORIDE, SODIUM LACTATE, POTASSIUM CHLORIDE, CALCIUM CHLORIDE 600; 310; 30; 20 MG/100ML; MG/100ML; MG/100ML; MG/100ML
125 INJECTION, SOLUTION INTRAVENOUS CONTINUOUS
Status: DISCONTINUED | OUTPATIENT
Start: 2021-04-21 | End: 2021-04-22 | Stop reason: HOSPADM

## 2021-04-21 RX ORDER — SODIUM CHLORIDE 0.9 % (FLUSH) 0.9 %
10 SYRINGE (ML) INJECTION AS NEEDED
Status: DISCONTINUED | OUTPATIENT
Start: 2021-04-21 | End: 2021-04-22 | Stop reason: HOSPADM

## 2021-04-21 RX ORDER — SODIUM CHLORIDE 0.9 % (FLUSH) 0.9 %
3 SYRINGE (ML) INJECTION EVERY 12 HOURS SCHEDULED
Status: DISCONTINUED | OUTPATIENT
Start: 2021-04-21 | End: 2021-04-22 | Stop reason: HOSPADM

## 2021-04-21 RX ORDER — CEFAZOLIN SODIUM IN 0.9 % NACL 3 G/100 ML
3 INTRAVENOUS SOLUTION, PIGGYBACK (ML) INTRAVENOUS ONCE
Status: COMPLETED | OUTPATIENT
Start: 2021-04-21 | End: 2021-04-22

## 2021-04-21 RX ADMIN — SODIUM CHLORIDE, POTASSIUM CHLORIDE, SODIUM LACTATE AND CALCIUM CHLORIDE 125 ML/HR: 600; 310; 30; 20 INJECTION, SOLUTION INTRAVENOUS at 22:18

## 2021-04-21 NOTE — PROGRESS NOTES
No results found for: ABORH, LABANTI, ABID    Chief Complaint   Patient presents with   • Routine Prenatal Visit     ob visit with PDC appt today per Dr Spencer patient needs NST       HPI: Urszula is a  currently at 37w2d who today reports the following: Nausea-  No; Contractions: YES,   Vaginal bleeding- No; Heartburn- YES    Today Urszula complains of irregular contractions  and heartburn  See ob flowsheet for physical exam.    Review of Systems - Negative except for present illness     Prenatal Assessment  Fetal Heart Rate: nst  Movement: Present  Prenatal Vitals  BP: 120/68  Weight: 119 kg (263 lb)     Tests done today: NST - reactive  U/S for EFW - at the PDC  At the time of the next visit, she will need to have none    Impression:   Encounter Diagnoses   Name Primary?   • History of 2  sections Yes   • History of bariatric surgery    • 37 weeks gestation of pregnancy    • Maternal morbid obesity, antepartum (CMS/[facility])        Plan: Return on 2021    This note was electronically signed.    Arley Luke MD

## 2021-04-21 NOTE — PROGRESS NOTES
Documentation of the ultasound findings, images, and interpretations will be available in the patient's Viewpoint report located in the Chart Review Imaging tab in [facility].

## 2021-04-21 NOTE — PROGRESS NOTES
"Pt denies all s/s PTL.  Reports \"have developed tachycardia and been put on bedrest by Dr. Martinez-cardiologist.\"   \"This exam is to decide if delivery can be earlier because of my heart.\" To see OB today and next cardiology appt \"in 3 months.\" Panorama low risk, female.  AFP-negative.  Pulse =115  "

## 2021-04-22 ENCOUNTER — ANESTHESIA (OUTPATIENT)
Dept: LABOR AND DELIVERY | Facility: HOSPITAL | Age: 31
End: 2021-04-22

## 2021-04-22 ENCOUNTER — ANESTHESIA EVENT (OUTPATIENT)
Dept: LABOR AND DELIVERY | Facility: HOSPITAL | Age: 31
End: 2021-04-22

## 2021-04-22 LAB
ABO GROUP BLD: NORMAL
BLD GP AB SCN SERPL QL: NEGATIVE
DEPRECATED RDW RBC AUTO: 50.2 FL (ref 37–54)
ERYTHROCYTE [DISTWIDTH] IN BLOOD BY AUTOMATED COUNT: 17.4 % (ref 12.3–15.4)
HCT VFR BLD AUTO: 30.6 % (ref 34–46.6)
HGB BLD-MCNC: 9.1 G/DL (ref 12–15.9)
MCH RBC QN AUTO: 23.8 PG (ref 26.6–33)
MCHC RBC AUTO-ENTMCNC: 29.7 G/DL (ref 31.5–35.7)
MCV RBC AUTO: 79.9 FL (ref 79–97)
PLATELET # BLD AUTO: 244 10*3/MM3 (ref 140–450)
PMV BLD AUTO: 10 FL (ref 6–12)
RBC # BLD AUTO: 3.83 10*6/MM3 (ref 3.77–5.28)
RH BLD: POSITIVE
SARS-COV-2 RDRP RESP QL NAA+PROBE: NORMAL
T&S EXPIRATION DATE: NORMAL
WBC # BLD AUTO: 15.28 10*3/MM3 (ref 3.4–10.8)

## 2021-04-22 PROCEDURE — 63710000001 DIPHENHYDRAMINE PER 50 MG: Performed by: OBSTETRICS & GYNECOLOGY

## 2021-04-22 PROCEDURE — 88302 TISSUE EXAM BY PATHOLOGIST: CPT | Performed by: OBSTETRICS & GYNECOLOGY

## 2021-04-22 PROCEDURE — 25010000002 FENTANYL CITRATE (PF) 100 MCG/2ML SOLUTION: Performed by: NURSE ANESTHETIST, CERTIFIED REGISTERED

## 2021-04-22 PROCEDURE — 25010000002 DIPHENHYDRAMINE PER 50 MG: Performed by: OBSTETRICS & GYNECOLOGY

## 2021-04-22 PROCEDURE — 58611 LIGATE OVIDUCT(S) ADD-ON: CPT | Performed by: OBSTETRICS & GYNECOLOGY

## 2021-04-22 PROCEDURE — 25010000002 KETOROLAC TROMETHAMINE PER 15 MG: Performed by: OBSTETRICS & GYNECOLOGY

## 2021-04-22 PROCEDURE — 59025 FETAL NON-STRESS TEST: CPT

## 2021-04-22 PROCEDURE — 59515 CESAREAN DELIVERY: CPT | Performed by: OBSTETRICS & GYNECOLOGY

## 2021-04-22 PROCEDURE — 85027 COMPLETE CBC AUTOMATED: CPT | Performed by: OBSTETRICS & GYNECOLOGY

## 2021-04-22 PROCEDURE — 25010000002 MIDAZOLAM PER 1 MG: Performed by: NURSE ANESTHETIST, CERTIFIED REGISTERED

## 2021-04-22 PROCEDURE — 25010000003 CEFAZOLIN PER 500 MG: Performed by: OBSTETRICS & GYNECOLOGY

## 2021-04-22 PROCEDURE — C1765 ADHESION BARRIER: HCPCS | Performed by: OBSTETRICS & GYNECOLOGY

## 2021-04-22 PROCEDURE — 25010000002 CEFAZOLIN PER 500 MG: Performed by: OBSTETRICS & GYNECOLOGY

## 2021-04-22 PROCEDURE — 25010000002 ATROPINE PER 0.01 MG: Performed by: NURSE ANESTHETIST, CERTIFIED REGISTERED

## 2021-04-22 PROCEDURE — 25010000002 ONDANSETRON PER 1 MG: Performed by: NURSE ANESTHETIST, CERTIFIED REGISTERED

## 2021-04-22 PROCEDURE — 0UB70ZZ EXCISION OF BILATERAL FALLOPIAN TUBES, OPEN APPROACH: ICD-10-PCS | Performed by: OBSTETRICS & GYNECOLOGY

## 2021-04-22 PROCEDURE — 25010000003 MORPHINE PER 10 MG: Performed by: NURSE ANESTHETIST, CERTIFIED REGISTERED

## 2021-04-22 DEVICE — ABSORBABLE ADHESION BARRIER
Type: IMPLANTABLE DEVICE | Status: FUNCTIONAL
Brand: GYNECARE INTERCEED

## 2021-04-22 RX ORDER — SODIUM CHLORIDE 0.9 % (FLUSH) 0.9 %
3-10 SYRINGE (ML) INJECTION AS NEEDED
Status: DISCONTINUED | OUTPATIENT
Start: 2021-04-22 | End: 2021-04-24 | Stop reason: HOSPADM

## 2021-04-22 RX ORDER — MISOPROSTOL 200 UG/1
600 TABLET ORAL AS NEEDED
Status: DISCONTINUED | OUTPATIENT
Start: 2021-04-22 | End: 2021-04-24 | Stop reason: HOSPADM

## 2021-04-22 RX ORDER — MISOPROSTOL 200 UG/1
800 TABLET ORAL AS NEEDED
Status: DISCONTINUED | OUTPATIENT
Start: 2021-04-22 | End: 2021-04-22 | Stop reason: SDUPTHER

## 2021-04-22 RX ORDER — PROMETHAZINE HYDROCHLORIDE 25 MG/1
25 TABLET ORAL EVERY 6 HOURS PRN
Status: DISCONTINUED | OUTPATIENT
Start: 2021-04-22 | End: 2021-04-24 | Stop reason: HOSPADM

## 2021-04-22 RX ORDER — KETOROLAC TROMETHAMINE 30 MG/ML
30 INJECTION, SOLUTION INTRAMUSCULAR; INTRAVENOUS ONCE
Status: COMPLETED | OUTPATIENT
Start: 2021-04-22 | End: 2021-04-22

## 2021-04-22 RX ORDER — OXYTOCIN-SODIUM CHLORIDE 0.9% IV SOLN 30 UNIT/500ML 30-0.9/5 UT/ML-%
SOLUTION INTRAVENOUS AS NEEDED
Status: DISCONTINUED | OUTPATIENT
Start: 2021-04-22 | End: 2021-04-22 | Stop reason: SURG

## 2021-04-22 RX ORDER — METHYLERGONOVINE MALEATE 0.2 MG/ML
200 INJECTION INTRAVENOUS ONCE AS NEEDED
Status: DISCONTINUED | OUTPATIENT
Start: 2021-04-22 | End: 2021-04-22 | Stop reason: SDUPTHER

## 2021-04-22 RX ORDER — OXYTOCIN-SODIUM CHLORIDE 0.9% IV SOLN 30 UNIT/500ML 30-0.9/5 UT/ML-%
85 SOLUTION INTRAVENOUS ONCE
Status: DISCONTINUED | OUTPATIENT
Start: 2021-04-22 | End: 2021-04-22

## 2021-04-22 RX ORDER — OXYTOCIN-SODIUM CHLORIDE 0.9% IV SOLN 30 UNIT/500ML 30-0.9/5 UT/ML-%
650 SOLUTION INTRAVENOUS ONCE
Status: DISCONTINUED | OUTPATIENT
Start: 2021-04-22 | End: 2021-04-22 | Stop reason: HOSPADM

## 2021-04-22 RX ORDER — ACETAMINOPHEN 500 MG
1000 TABLET ORAL EVERY 6 HOURS
Status: COMPLETED | OUTPATIENT
Start: 2021-04-22 | End: 2021-04-23

## 2021-04-22 RX ORDER — LANOLIN
CREAM (ML) TOPICAL
Status: DISCONTINUED | OUTPATIENT
Start: 2021-04-22 | End: 2021-04-24 | Stop reason: HOSPADM

## 2021-04-22 RX ORDER — ALUMINA, MAGNESIA, AND SIMETHICONE 2400; 2400; 240 MG/30ML; MG/30ML; MG/30ML
15 SUSPENSION ORAL EVERY 4 HOURS PRN
Status: DISCONTINUED | OUTPATIENT
Start: 2021-04-22 | End: 2021-04-24 | Stop reason: HOSPADM

## 2021-04-22 RX ORDER — PROMETHAZINE HYDROCHLORIDE 12.5 MG/1
12.5 SUPPOSITORY RECTAL EVERY 6 HOURS PRN
Status: DISCONTINUED | OUTPATIENT
Start: 2021-04-22 | End: 2021-04-24 | Stop reason: HOSPADM

## 2021-04-22 RX ORDER — DIPHENHYDRAMINE HYDROCHLORIDE 50 MG/ML
12.5 INJECTION INTRAMUSCULAR; INTRAVENOUS ONCE
Status: COMPLETED | OUTPATIENT
Start: 2021-04-22 | End: 2021-04-22

## 2021-04-22 RX ORDER — OXYTOCIN-SODIUM CHLORIDE 0.9% IV SOLN 30 UNIT/500ML 30-0.9/5 UT/ML-%
85 SOLUTION INTRAVENOUS ONCE
Status: DISCONTINUED | OUTPATIENT
Start: 2021-04-22 | End: 2021-04-22 | Stop reason: HOSPADM

## 2021-04-22 RX ORDER — KETOROLAC TROMETHAMINE 15 MG/ML
15 INJECTION, SOLUTION INTRAMUSCULAR; INTRAVENOUS EVERY 6 HOURS
Status: COMPLETED | OUTPATIENT
Start: 2021-04-22 | End: 2021-04-23

## 2021-04-22 RX ORDER — CARBOPROST TROMETHAMINE 250 UG/ML
250 INJECTION, SOLUTION INTRAMUSCULAR AS NEEDED
Status: DISCONTINUED | OUTPATIENT
Start: 2021-04-22 | End: 2021-04-24 | Stop reason: HOSPADM

## 2021-04-22 RX ORDER — BUPIVACAINE HYDROCHLORIDE 7.5 MG/ML
INJECTION, SOLUTION INTRASPINAL AS NEEDED
Status: DISCONTINUED | OUTPATIENT
Start: 2021-04-22 | End: 2021-04-22 | Stop reason: SURG

## 2021-04-22 RX ORDER — DIPHENHYDRAMINE HCL 25 MG
25 CAPSULE ORAL EVERY 4 HOURS PRN
Status: DISCONTINUED | OUTPATIENT
Start: 2021-04-22 | End: 2021-04-24 | Stop reason: HOSPADM

## 2021-04-22 RX ORDER — CALCIUM CARBONATE 200(500)MG
1 TABLET,CHEWABLE ORAL EVERY 4 HOURS PRN
Status: DISCONTINUED | OUTPATIENT
Start: 2021-04-22 | End: 2021-04-24 | Stop reason: HOSPADM

## 2021-04-22 RX ORDER — BUPIVACAINE HCL/0.9 % NACL/PF 0.125 %
PLASTIC BAG, INJECTION (ML) EPIDURAL AS NEEDED
Status: DISCONTINUED | OUTPATIENT
Start: 2021-04-22 | End: 2021-04-22 | Stop reason: SURG

## 2021-04-22 RX ORDER — OXYCODONE HYDROCHLORIDE 5 MG/1
5 TABLET ORAL EVERY 4 HOURS PRN
Status: DISCONTINUED | OUTPATIENT
Start: 2021-04-22 | End: 2021-04-24 | Stop reason: HOSPADM

## 2021-04-22 RX ORDER — SODIUM CHLORIDE 0.9 % (FLUSH) 0.9 %
3 SYRINGE (ML) INJECTION EVERY 12 HOURS SCHEDULED
Status: DISCONTINUED | OUTPATIENT
Start: 2021-04-22 | End: 2021-04-24 | Stop reason: HOSPADM

## 2021-04-22 RX ORDER — KETOROLAC TROMETHAMINE 30 MG/ML
30 INJECTION, SOLUTION INTRAMUSCULAR; INTRAVENOUS ONCE
Status: DISCONTINUED | OUTPATIENT
Start: 2021-04-22 | End: 2021-04-22 | Stop reason: SDUPTHER

## 2021-04-22 RX ORDER — ONDANSETRON 2 MG/ML
INJECTION INTRAMUSCULAR; INTRAVENOUS AS NEEDED
Status: DISCONTINUED | OUTPATIENT
Start: 2021-04-22 | End: 2021-04-22 | Stop reason: SURG

## 2021-04-22 RX ORDER — SODIUM CHLORIDE, SODIUM LACTATE, POTASSIUM CHLORIDE, CALCIUM CHLORIDE 600; 310; 30; 20 MG/100ML; MG/100ML; MG/100ML; MG/100ML
125 INJECTION, SOLUTION INTRAVENOUS CONTINUOUS
Status: DISCONTINUED | OUTPATIENT
Start: 2021-04-22 | End: 2021-04-23

## 2021-04-22 RX ORDER — CARBOPROST TROMETHAMINE 250 UG/ML
250 INJECTION, SOLUTION INTRAMUSCULAR AS NEEDED
Status: DISCONTINUED | OUTPATIENT
Start: 2021-04-22 | End: 2021-04-22 | Stop reason: HOSPADM

## 2021-04-22 RX ORDER — ACETAMINOPHEN 325 MG/1
650 TABLET ORAL EVERY 6 HOURS
Status: DISCONTINUED | OUTPATIENT
Start: 2021-04-23 | End: 2021-04-24 | Stop reason: HOSPADM

## 2021-04-22 RX ORDER — METHYLERGONOVINE MALEATE 0.2 MG/ML
200 INJECTION INTRAVENOUS ONCE AS NEEDED
Status: DISCONTINUED | OUTPATIENT
Start: 2021-04-22 | End: 2021-04-22 | Stop reason: HOSPADM

## 2021-04-22 RX ORDER — MISOPROSTOL 200 UG/1
800 TABLET ORAL AS NEEDED
Status: DISCONTINUED | OUTPATIENT
Start: 2021-04-22 | End: 2021-04-22 | Stop reason: HOSPADM

## 2021-04-22 RX ORDER — DOCUSATE SODIUM 100 MG/1
100 CAPSULE, LIQUID FILLED ORAL 2 TIMES DAILY PRN
Status: DISCONTINUED | OUTPATIENT
Start: 2021-04-22 | End: 2021-04-24 | Stop reason: HOSPADM

## 2021-04-22 RX ORDER — FENTANYL CITRATE 50 UG/ML
INJECTION, SOLUTION INTRAMUSCULAR; INTRAVENOUS AS NEEDED
Status: DISCONTINUED | OUTPATIENT
Start: 2021-04-22 | End: 2021-04-22 | Stop reason: SURG

## 2021-04-22 RX ORDER — TRISODIUM CITRATE DIHYDRATE AND CITRIC ACID MONOHYDRATE 500; 334 MG/5ML; MG/5ML
30 SOLUTION ORAL ONCE
Status: DISCONTINUED | OUTPATIENT
Start: 2021-04-22 | End: 2021-04-23

## 2021-04-22 RX ORDER — SIMETHICONE 80 MG
80 TABLET,CHEWABLE ORAL 4 TIMES DAILY PRN
Status: DISCONTINUED | OUTPATIENT
Start: 2021-04-22 | End: 2021-04-24 | Stop reason: HOSPADM

## 2021-04-22 RX ORDER — CEFAZOLIN SODIUM IN 0.9 % NACL 3 G/100 ML
3 INTRAVENOUS SOLUTION, PIGGYBACK (ML) INTRAVENOUS ONCE
Status: DISCONTINUED | OUTPATIENT
Start: 2021-04-22 | End: 2021-04-22

## 2021-04-22 RX ORDER — SODIUM CHLORIDE 0.9 % (FLUSH) 0.9 %
10 SYRINGE (ML) INJECTION AS NEEDED
Status: DISCONTINUED | OUTPATIENT
Start: 2021-04-22 | End: 2021-04-23

## 2021-04-22 RX ORDER — ATROPINE SULFATE 0.4 MG/ML
AMPUL (ML) INJECTION AS NEEDED
Status: DISCONTINUED | OUTPATIENT
Start: 2021-04-22 | End: 2021-04-22 | Stop reason: SURG

## 2021-04-22 RX ORDER — FAMOTIDINE 10 MG/ML
INJECTION, SOLUTION INTRAVENOUS AS NEEDED
Status: DISCONTINUED | OUTPATIENT
Start: 2021-04-22 | End: 2021-04-22 | Stop reason: SURG

## 2021-04-22 RX ORDER — OXYCODONE HYDROCHLORIDE 5 MG/1
10 TABLET ORAL EVERY 4 HOURS PRN
Status: DISCONTINUED | OUTPATIENT
Start: 2021-04-22 | End: 2021-04-24 | Stop reason: HOSPADM

## 2021-04-22 RX ORDER — METHYLERGONOVINE MALEATE 0.2 MG/ML
200 INJECTION INTRAVENOUS AS NEEDED
Status: DISCONTINUED | OUTPATIENT
Start: 2021-04-22 | End: 2021-04-24 | Stop reason: HOSPADM

## 2021-04-22 RX ORDER — FLUOXETINE HYDROCHLORIDE 20 MG/1
20 CAPSULE ORAL DAILY
Status: DISCONTINUED | OUTPATIENT
Start: 2021-04-22 | End: 2021-04-24 | Stop reason: HOSPADM

## 2021-04-22 RX ORDER — MORPHINE SULFATE 0.5 MG/ML
INJECTION, SOLUTION EPIDURAL; INTRATHECAL; INTRAVENOUS AS NEEDED
Status: DISCONTINUED | OUTPATIENT
Start: 2021-04-22 | End: 2021-04-22 | Stop reason: SURG

## 2021-04-22 RX ORDER — FLUDROCORTISONE ACETATE 0.1 MG/1
0.1 TABLET ORAL 2 TIMES DAILY
Status: DISCONTINUED | OUTPATIENT
Start: 2021-04-22 | End: 2021-04-24 | Stop reason: HOSPADM

## 2021-04-22 RX ORDER — ACETAMINOPHEN 500 MG
1000 TABLET ORAL ONCE
Status: DISCONTINUED | OUTPATIENT
Start: 2021-04-22 | End: 2021-04-22

## 2021-04-22 RX ORDER — LIDOCAINE HYDROCHLORIDE 10 MG/ML
5 INJECTION, SOLUTION EPIDURAL; INFILTRATION; INTRACAUDAL; PERINEURAL AS NEEDED
Status: DISCONTINUED | OUTPATIENT
Start: 2021-04-22 | End: 2021-04-23

## 2021-04-22 RX ORDER — SODIUM CHLORIDE 0.9 % (FLUSH) 0.9 %
3 SYRINGE (ML) INJECTION EVERY 12 HOURS SCHEDULED
Status: DISCONTINUED | OUTPATIENT
Start: 2021-04-22 | End: 2021-04-23

## 2021-04-22 RX ORDER — MIDAZOLAM HYDROCHLORIDE 1 MG/ML
INJECTION INTRAMUSCULAR; INTRAVENOUS AS NEEDED
Status: DISCONTINUED | OUTPATIENT
Start: 2021-04-22 | End: 2021-04-22 | Stop reason: SURG

## 2021-04-22 RX ORDER — IBUPROFEN 600 MG/1
600 TABLET ORAL EVERY 6 HOURS
Status: DISCONTINUED | OUTPATIENT
Start: 2021-04-23 | End: 2021-04-24 | Stop reason: HOSPADM

## 2021-04-22 RX ORDER — OXYTOCIN-SODIUM CHLORIDE 0.9% IV SOLN 30 UNIT/500ML 30-0.9/5 UT/ML-%
650 SOLUTION INTRAVENOUS ONCE
Status: DISCONTINUED | OUTPATIENT
Start: 2021-04-22 | End: 2021-04-22

## 2021-04-22 RX ORDER — HYDROCORTISONE 25 MG/G
1 CREAM TOPICAL AS NEEDED
Status: DISCONTINUED | OUTPATIENT
Start: 2021-04-22 | End: 2021-04-24 | Stop reason: HOSPADM

## 2021-04-22 RX ORDER — CARBOPROST TROMETHAMINE 250 UG/ML
250 INJECTION, SOLUTION INTRAMUSCULAR AS NEEDED
Status: DISCONTINUED | OUTPATIENT
Start: 2021-04-22 | End: 2021-04-22 | Stop reason: SDUPTHER

## 2021-04-22 RX ADMIN — FLUDROCORTISONE ACETATE 0.1 MG: 0.1 TABLET ORAL at 21:01

## 2021-04-22 RX ADMIN — OXYCODONE 5 MG: 5 TABLET ORAL at 23:32

## 2021-04-22 RX ADMIN — ACETAMINOPHEN 1000 MG: 500 TABLET, FILM COATED ORAL at 16:40

## 2021-04-22 RX ADMIN — FENTANYL CITRATE 20 MCG: 50 INJECTION, SOLUTION INTRAMUSCULAR; INTRAVENOUS at 12:19

## 2021-04-22 RX ADMIN — ONDANSETRON 4 MG: 2 INJECTION INTRAMUSCULAR; INTRAVENOUS at 12:07

## 2021-04-22 RX ADMIN — FLUOXETINE 20 MG: 20 CAPSULE ORAL at 18:11

## 2021-04-22 RX ADMIN — SODIUM CHLORIDE, POTASSIUM CHLORIDE, SODIUM LACTATE AND CALCIUM CHLORIDE 1000 ML: 600; 310; 30; 20 INJECTION, SOLUTION INTRAVENOUS at 11:15

## 2021-04-22 RX ADMIN — Medication: at 16:40

## 2021-04-22 RX ADMIN — MORPHINE SULFATE 0.15 MG: 0.5 INJECTION, SOLUTION EPIDURAL; INTRATHECAL; INTRAVENOUS at 12:19

## 2021-04-22 RX ADMIN — FENTANYL CITRATE 30 MCG: 50 INJECTION, SOLUTION INTRAMUSCULAR; INTRAVENOUS at 12:51

## 2021-04-22 RX ADMIN — DOCUSATE SODIUM 100 MG: 100 CAPSULE, LIQUID FILLED ORAL at 21:01

## 2021-04-22 RX ADMIN — KETOROLAC TROMETHAMINE 15 MG: 15 INJECTION, SOLUTION INTRAMUSCULAR; INTRAVENOUS at 18:54

## 2021-04-22 RX ADMIN — OXYTOCIN 500 ML: 10 INJECTION INTRAVENOUS at 13:07

## 2021-04-22 RX ADMIN — DIPHENHYDRAMINE HYDROCHLORIDE 12.5 MG: 50 INJECTION INTRAMUSCULAR; INTRAVENOUS at 01:22

## 2021-04-22 RX ADMIN — ACETAMINOPHEN 1000 MG: 500 TABLET, FILM COATED ORAL at 09:54

## 2021-04-22 RX ADMIN — DIPHENHYDRAMINE HYDROCHLORIDE 25 MG: 25 CAPSULE ORAL at 23:32

## 2021-04-22 RX ADMIN — OXYTOCIN 500 ML: 10 INJECTION INTRAVENOUS at 12:41

## 2021-04-22 RX ADMIN — OXYCODONE 5 MG: 5 TABLET ORAL at 16:05

## 2021-04-22 RX ADMIN — CEFAZOLIN 3 G: 10 INJECTION, POWDER, FOR SOLUTION INTRAVENOUS at 12:04

## 2021-04-22 RX ADMIN — ATROPINE SULFATE 0.2 MG: 0.4 INJECTION, SOLUTION INTRAMUSCULAR; INTRAVENOUS; SUBCUTANEOUS at 12:28

## 2021-04-22 RX ADMIN — SODIUM CHLORIDE, POTASSIUM CHLORIDE, SODIUM LACTATE AND CALCIUM CHLORIDE 125 ML/HR: 600; 310; 30; 20 INJECTION, SOLUTION INTRAVENOUS at 06:21

## 2021-04-22 RX ADMIN — KETOROLAC TROMETHAMINE 30 MG: 30 INJECTION, SOLUTION INTRAMUSCULAR at 13:44

## 2021-04-22 RX ADMIN — BUPIVACAINE HYDROCHLORIDE IN DEXTROSE 1.5 MG: 7.5 INJECTION, SOLUTION SUBARACHNOID at 12:19

## 2021-04-22 RX ADMIN — Medication 100 MCG: at 12:28

## 2021-04-22 RX ADMIN — SODIUM CITRATE AND CITRIC ACID MONOHYDRATE 30 ML: 500; 334 SOLUTION ORAL at 12:07

## 2021-04-22 RX ADMIN — SIMETHICONE 80 MG: 80 TABLET, CHEWABLE ORAL at 21:01

## 2021-04-22 RX ADMIN — SODIUM CHLORIDE, POTASSIUM CHLORIDE, SODIUM LACTATE AND CALCIUM CHLORIDE: 600; 310; 30; 20 INJECTION, SOLUTION INTRAVENOUS at 12:43

## 2021-04-22 RX ADMIN — MIDAZOLAM 1 MG: 1 INJECTION INTRAMUSCULAR; INTRAVENOUS at 12:10

## 2021-04-22 RX ADMIN — SODIUM CHLORIDE, POTASSIUM CHLORIDE, SODIUM LACTATE AND CALCIUM CHLORIDE 125 ML/HR: 600; 310; 30; 20 INJECTION, SOLUTION INTRAVENOUS at 11:44

## 2021-04-22 RX ADMIN — FENTANYL CITRATE 50 MCG: 50 INJECTION, SOLUTION INTRAMUSCULAR; INTRAVENOUS at 12:57

## 2021-04-22 RX ADMIN — ACETAMINOPHEN 1000 MG: 500 TABLET, FILM COATED ORAL at 22:18

## 2021-04-22 RX ADMIN — FAMOTIDINE 20 MG: 10 INJECTION, SOLUTION INTRAVENOUS at 12:07

## 2021-04-22 NOTE — ANESTHESIA PROCEDURE NOTES
Spinal Block      Patient reassessed immediately prior to procedure    Indication:procedure for pain  Performed By  CRNA: Cassie Brewer CRNA  Preanesthetic Checklist  Completed: patient identified, IV checked, risks and benefits discussed, surgical consent, monitors and equipment checked, pre-op evaluation and timeout performed  Spinal Block Prep:  Patient Position:sitting  Sterile Tech:cap, gloves, mask and sterile barriers  Prep:Betadine  Patient Monitoring:blood pressure monitoring, continuous pulse oximetry and EKG  Spinal Block Procedure  Approach:midline  Guidance:landmark technique and palpation technique  Location:L3-L4  Needle Type:Migue  Needle Gauge:25 G  Placement of Spinal needle event:cerebrospinal fluid aspirated  Paresthesia: no  Fluid Appearance:clear     Post Assessment  Patient Tolerance:patient tolerated the procedure well with no apparent complications  Complications no

## 2021-04-22 NOTE — ANESTHESIA PREPROCEDURE EVALUATION
Anesthesia Evaluation     Patient summary reviewed and Nursing notes reviewed   NPO Solid Status: > 8 hours  NPO Liquid Status: > 8 hours           Airway   Dental      Pulmonary - negative pulmonary ROS   Cardiovascular - negative cardio ROS        Neuro/Psych  (+) psychiatric history Anxiety and Depression,     GI/Hepatic/Renal/Endo    (+) obesity, morbid obesity, GERD,      Musculoskeletal (-) negative ROS    Abdominal    Substance History - negative use     OB/GYN    (+) Pregnant,     Comment: POTS      Other                        Anesthesia Plan    ASA 3     spinal and ITN       Anesthetic plan, all risks, benefits, and alternatives have been provided, discussed and informed consent has been obtained with: patient.

## 2021-04-22 NOTE — ANESTHESIA POSTPROCEDURE EVALUATION
Patient: Urszula Gonzalez    Procedure Summary     Date: 21 Room / Location: [facility] LABOR DELIVERY   [facility]    Anesthesia Start: 1209 Anesthesia Stop: 1313    Procedure:  SECTION REPEAT WITH TUBAL (Bilateral Abdomen) Diagnosis:       History of 2  sections      (History of 2  sections [Z98.891])    Surgeons: Vinayak Gutierrez MD Provider: Herrera Alaniz DO    Anesthesia Type: spinal, ITN ASA Status: 3          Anesthesia Type: spinal, ITN    Vitals  Vitals Value Taken Time   /79 21 1311   Temp 97.7 °F (36.5 °C) 21 1311   Pulse 99 21 1315   Resp 16 21 1311   SpO2 99 % 21 1315   Vitals shown include unvalidated device data.        Post Anesthesia Care and Evaluation    Patient location during evaluation: bedside  Patient participation: complete - patient participated  Level of consciousness: awake and alert  Pain score: 0  Pain management: adequate  Airway patency: patent  Anesthetic complications: No anesthetic complications    Cardiovascular status: acceptable  Respiratory status: acceptable  Hydration status: acceptable

## 2021-04-23 PROBLEM — Z98.891 HISTORY OF 2 CESAREAN SECTIONS: Status: RESOLVED | Noted: 2020-10-09 | Resolved: 2021-04-23

## 2021-04-23 PROBLEM — Z98.84 HISTORY OF BARIATRIC SURGERY: Status: RESOLVED | Noted: 2020-10-09 | Resolved: 2021-04-23

## 2021-04-23 PROBLEM — O99.210 MATERNAL MORBID OBESITY, ANTEPARTUM: Status: RESOLVED | Noted: 2020-12-21 | Resolved: 2021-04-23

## 2021-04-23 PROBLEM — O09.40 ENCOUNTER FOR SUPERVISION OF HIGH RISK PREGNANCY WITH GRAND MULTIPARITY, ANTEPARTUM: Status: RESOLVED | Noted: 2020-10-09 | Resolved: 2021-04-23

## 2021-04-23 PROBLEM — R10.11 RIGHT UPPER QUADRANT ABDOMINAL PAIN: Status: RESOLVED | Noted: 2019-03-13 | Resolved: 2021-04-23

## 2021-04-23 PROBLEM — E66.01 MATERNAL MORBID OBESITY, ANTEPARTUM (HCC): Status: RESOLVED | Noted: 2020-12-21 | Resolved: 2021-04-23

## 2021-04-23 PROBLEM — Z3A.37 PREGNANCY WITH 37 WEEKS COMPLETED GESTATION: Status: RESOLVED | Noted: 2021-04-21 | Resolved: 2021-04-23

## 2021-04-23 PROBLEM — R14.0 BLOATING: Status: RESOLVED | Noted: 2019-03-13 | Resolved: 2021-04-23

## 2021-04-23 PROBLEM — Z3A.36 36 WEEKS GESTATION OF PREGNANCY: Status: RESOLVED | Noted: 2020-10-09 | Resolved: 2021-04-23

## 2021-04-23 PROBLEM — K82.8 DYSFUNCTIONAL GALLBLADDER: Status: RESOLVED | Noted: 2020-03-12 | Resolved: 2021-04-23

## 2021-04-23 LAB
HCT VFR BLD AUTO: 30.1 % (ref 34–46.6)
HGB BLD-MCNC: 8.9 G/DL (ref 12–15.9)

## 2021-04-23 PROCEDURE — 25010000002 KETOROLAC TROMETHAMINE PER 15 MG: Performed by: OBSTETRICS & GYNECOLOGY

## 2021-04-23 PROCEDURE — 0503F POSTPARTUM CARE VISIT: CPT | Performed by: OBSTETRICS & GYNECOLOGY

## 2021-04-23 PROCEDURE — 85018 HEMOGLOBIN: CPT | Performed by: OBSTETRICS & GYNECOLOGY

## 2021-04-23 PROCEDURE — 85014 HEMATOCRIT: CPT | Performed by: OBSTETRICS & GYNECOLOGY

## 2021-04-23 RX ORDER — OXYCODONE HYDROCHLORIDE AND ACETAMINOPHEN 5; 325 MG/1; MG/1
1-2 TABLET ORAL EVERY 6 HOURS PRN
Qty: 30 TABLET | Refills: 0 | Status: CANCELLED | OUTPATIENT
Start: 2021-04-23

## 2021-04-23 RX ORDER — PSEUDOEPHEDRINE HCL 30 MG
100 TABLET ORAL 2 TIMES DAILY PRN
Qty: 60 CAPSULE | Refills: 1 | Status: CANCELLED | OUTPATIENT
Start: 2021-04-23

## 2021-04-23 RX ADMIN — FLUOXETINE 20 MG: 20 CAPSULE ORAL at 09:24

## 2021-04-23 RX ADMIN — OXYCODONE 5 MG: 5 TABLET ORAL at 11:17

## 2021-04-23 RX ADMIN — OXYCODONE 5 MG: 5 TABLET ORAL at 03:42

## 2021-04-23 RX ADMIN — OXYCODONE 5 MG: 5 TABLET ORAL at 16:58

## 2021-04-23 RX ADMIN — SIMETHICONE 80 MG: 80 TABLET, CHEWABLE ORAL at 17:26

## 2021-04-23 RX ADMIN — ACETAMINOPHEN 1000 MG: 500 TABLET, FILM COATED ORAL at 09:27

## 2021-04-23 RX ADMIN — KETOROLAC TROMETHAMINE 15 MG: 15 INJECTION, SOLUTION INTRAMUSCULAR; INTRAVENOUS at 00:45

## 2021-04-23 RX ADMIN — ACETAMINOPHEN 1000 MG: 500 TABLET, FILM COATED ORAL at 04:26

## 2021-04-23 RX ADMIN — KETOROLAC TROMETHAMINE 15 MG: 15 INJECTION, SOLUTION INTRAMUSCULAR; INTRAVENOUS at 06:50

## 2021-04-23 RX ADMIN — ACETAMINOPHEN 650 MG: 325 TABLET ORAL at 21:54

## 2021-04-23 RX ADMIN — KETOROLAC TROMETHAMINE 15 MG: 15 INJECTION, SOLUTION INTRAMUSCULAR; INTRAVENOUS at 13:28

## 2021-04-23 RX ADMIN — ACETAMINOPHEN 650 MG: 325 TABLET ORAL at 15:54

## 2021-04-23 RX ADMIN — OXYCODONE 5 MG: 5 TABLET ORAL at 21:09

## 2021-04-23 RX ADMIN — IBUPROFEN 600 MG: 600 TABLET ORAL at 19:59

## 2021-04-23 RX ADMIN — FLUDROCORTISONE ACETATE 0.1 MG: 0.1 TABLET ORAL at 09:24

## 2021-04-23 RX ADMIN — SODIUM CHLORIDE, PRESERVATIVE FREE 3 ML: 5 INJECTION INTRAVENOUS at 00:47

## 2021-04-23 NOTE — ANESTHESIA POSTPROCEDURE EVALUATION
Patient: Urszula Gonzalez    Procedure Summary     Date: 21 Room / Location: [facility] LABOR DELIVERY  [facility] LABOR DELIVERY    Anesthesia Start: 1209 Anesthesia Stop: 1313    Procedure:  SECTION REPEAT WITH TUBAL (Bilateral Abdomen) Diagnosis:       History of 2  sections      (History of 2  sections [Z98.891])    Surgeons: Vinayak Gutierrez MD Provider: Herrera Alaniz DO    Anesthesia Type: spinal, ITN ASA Status: 3          Anesthesia Type: spinal, ITN    Vitals  Vitals Value Taken Time   /53 21 0720   Temp 97.7 °F (36.5 °C) 21 0720   Pulse 70 21 0720   Resp 18 21 0720   SpO2 100 % 21 1415           Post Anesthesia Care and Evaluation    Patient location during evaluation: bedside  Patient participation: complete - patient participated  Level of consciousness: awake and alert  Pain management: adequate  Airway patency: patent  Anesthetic complications: No anesthetic complications    Cardiovascular status: acceptable  Respiratory status: acceptable  Hydration status: acceptable  Post Neuraxial Block status: Motor and sensory function returned to baseline and No signs or symptoms of PDPH

## 2021-04-24 VITALS
TEMPERATURE: 98 F | OXYGEN SATURATION: 100 % | SYSTOLIC BLOOD PRESSURE: 107 MMHG | HEIGHT: 66 IN | WEIGHT: 263 LBS | RESPIRATION RATE: 18 BRPM | HEART RATE: 79 BPM | DIASTOLIC BLOOD PRESSURE: 57 MMHG | BODY MASS INDEX: 42.27 KG/M2

## 2021-04-24 RX ORDER — IBUPROFEN 600 MG/1
600 TABLET ORAL EVERY 6 HOURS PRN
Qty: 30 TABLET | Refills: 0 | Status: SHIPPED | OUTPATIENT
Start: 2021-04-24 | End: 2021-05-04

## 2021-04-24 RX ORDER — OXYCODONE HYDROCHLORIDE AND ACETAMINOPHEN 5; 325 MG/1; MG/1
1-2 TABLET ORAL EVERY 4 HOURS PRN
Qty: 14 TABLET | Refills: 0 | Status: SHIPPED | OUTPATIENT
Start: 2021-04-24 | End: 2021-04-29

## 2021-04-24 RX ADMIN — OXYCODONE 5 MG: 5 TABLET ORAL at 04:42

## 2021-04-24 RX ADMIN — ACETAMINOPHEN 650 MG: 325 TABLET ORAL at 04:26

## 2021-04-24 RX ADMIN — IBUPROFEN 600 MG: 600 TABLET ORAL at 08:14

## 2021-04-24 RX ADMIN — IBUPROFEN 600 MG: 600 TABLET ORAL at 01:36

## 2021-04-24 RX ADMIN — OXYCODONE 5 MG: 5 TABLET ORAL at 00:42

## 2021-04-24 RX ADMIN — SIMETHICONE 80 MG: 80 TABLET, CHEWABLE ORAL at 08:14

## 2021-04-24 RX ADMIN — FLUOXETINE 20 MG: 20 CAPSULE ORAL at 08:14

## 2021-04-24 RX ADMIN — OXYCODONE 5 MG: 5 TABLET ORAL at 09:05

## 2021-04-26 ENCOUNTER — APPOINTMENT (OUTPATIENT)
Dept: WOMENS IMAGING | Facility: HOSPITAL | Age: 31
End: 2021-04-26

## 2021-05-03 ENCOUNTER — APPOINTMENT (OUTPATIENT)
Dept: PREADMISSION TESTING | Facility: HOSPITAL | Age: 31
End: 2021-05-03

## 2021-05-10 ENCOUNTER — POSTPARTUM VISIT (OUTPATIENT)
Dept: OBSTETRICS AND GYNECOLOGY | Facility: CLINIC | Age: 31
End: 2021-05-10

## 2021-05-10 VITALS
SYSTOLIC BLOOD PRESSURE: 110 MMHG | TEMPERATURE: 97.3 F | BODY MASS INDEX: 39.7 KG/M2 | WEIGHT: 247 LBS | DIASTOLIC BLOOD PRESSURE: 78 MMHG | HEIGHT: 66 IN

## 2021-05-10 DIAGNOSIS — Z98.890 POSTOPERATIVE STATE: Primary | ICD-10-CM

## 2021-05-10 PROCEDURE — 0503F POSTPARTUM CARE VISIT: CPT | Performed by: OBSTETRICS & GYNECOLOGY

## 2021-05-10 RX ORDER — FERROUS SULFATE 325(65) MG
325 TABLET ORAL DAILY
COMMUNITY
End: 2021-06-30

## 2021-05-10 RX ORDER — PANTOPRAZOLE SODIUM 40 MG/1
40 TABLET, DELAYED RELEASE ORAL DAILY
COMMUNITY

## 2021-05-10 RX ORDER — FLUOXETINE HYDROCHLORIDE 20 MG/1
20 CAPSULE ORAL DAILY
COMMUNITY
End: 2021-11-18 | Stop reason: SDUPTHER

## 2021-05-10 NOTE — PROGRESS NOTES
CC:  2 weeks post c section done at 37 weeks for protracted tachycardia and lightheadedness  HPI:    Doing well with normal bowel and bladder function  Normal lochia  Bottle feeding  Her predelivery CV sym[toms have resolved ad she was discharged with and is not on medications  She had a BTL  ROS  Otherwise negative  VS  Reviewed  PE  Incision healing well  Impression  Satisfactory postoperative course to date  Plan return in 4 weeks

## 2021-06-30 ENCOUNTER — POSTPARTUM VISIT (OUTPATIENT)
Dept: OBSTETRICS AND GYNECOLOGY | Facility: CLINIC | Age: 31
End: 2021-06-30

## 2021-06-30 VITALS
HEIGHT: 66 IN | WEIGHT: 255 LBS | SYSTOLIC BLOOD PRESSURE: 120 MMHG | BODY MASS INDEX: 40.98 KG/M2 | DIASTOLIC BLOOD PRESSURE: 78 MMHG

## 2021-06-30 PROCEDURE — 99213 OFFICE O/P EST LOW 20 MIN: CPT | Performed by: OBSTETRICS & GYNECOLOGY

## 2021-06-30 NOTE — PROGRESS NOTES
"Subjective   Chief Complaint   Patient presents with   • Post-op     9 weeks postop  with no problems      Urszula Gonzalez is a 31 y.o. year old  presenting to be seen for her postpartum visit.  She had a Repeat  (LTCS) with BTL.  Her daughter is doing well.    Since delivery she has been sexually active.  She does not have concerns about post-partum blues/depression.   [place] Score = 0  She is bottle feeding.  For ongoing contraception, her plans are tubal ligation.    The following portions of the patient's history were reviewed and updated as appropriate:current medications and allergies    Social History    Tobacco Use      Smoking status: Never Smoker      Smokeless tobacco: Never Used      Review of Systems  Constitutional POS: nothing reported    NEG: anorexia or night sweats   Genitourinary POS: nothing reported    NEG: dysuria or hematuria      Gastointestinal POS: nothing reported    NEG: bloating, change in bowel habits, melena or reflux symptoms   Breast POS: nothing reported    NEG: persistent breast lump, skin dimpling or nipple discharge        Objective   /78   Ht 167.6 cm (66\")   Wt 116 kg (255 lb)   LMP  (LMP Unknown)   Breastfeeding No   BMI 41.16 kg/m²     General:  well developed; well nourished  no acute distress  obese - Body mass index is 41.16 kg/m².   Abdomen: soft, non-tender; no masses  no umbilical or inguinal hernias are present  no hepato-splenomegaly   Pelvis: Exam limited by  body habitus  Clinical staff was present for exam  External genitalia:  normal appearance of the external genitalia including Bartholin's and [place]'s glands.  :  urethral meatus normal;  Vaginal:  normal pink mucosa without prolapse or lesions.  Cervix:  normal appearance.  Uterus:  normal size, shape and consistency. anteverted; fully involuted  Adnexa:  normal bimanual exam of the adnexa.          Assessment   1. 9 weeks  S/P Repeat  (LTCS) with BTL     Plan "   1. BC options reviewed and compared today: tubal ligation  2. The importance of keeping all planned follow-up and taking all medications as prescribed was emphasized.  3. Follow up for annual exam 4 months    No orders of the defined types were placed in this encounter.         This note was electronically signed.    Vinayak Gutierrez M.D.  June 30, 2021    Note: Speech recognition transcription software may have been used to create portions of this document.  An attempt at proofreading has been made but errors in transcription could still be present.

## 2021-07-05 NOTE — TELEPHONE ENCOUNTER
"  7/5/2021      RE: Antony Carlos  [address] [place] [address]       Pediatric Gastroenterology, Hepatology, and Nutrition    Outpatient initial consultation  Consultation requested by: Olive Mckeon, for: Fanconi anemia, s/p BMT    Diagnoses:  Patient Active Problem List   Diagnosis     Fanconi's anemia (H)     Multiple nevi     Café au lait spot     Short stature associated with congenital syndrome     Pubertal delay     Cytopenia     Rectal or anal pain     Malaise and fatigue     Hemorrhagic cystitis     Bone marrow transplant candidate     Failure of stem cell transplant (H)     Hx of stem cell transplant (H)     Generalized pain     Neutropenia (H)     Fluid overload     Thrombocytopenia (H)     Peripheral polyneuropathy     Central pain syndrome     Acute kidney failure, unspecified (H)     Fever     Examination of participant or control in clinical research     Lower abdominal pain     Diarrhea, unspecified type       HPI:    Antony Carlos was seen in [facility] for consultation on 07/04/21. he receives primary care from Olive Mckeon.  This consultation was recommended by Olive Mckeon.  Medical records were reviewed prior to this visit. Antony was accompanied today by his mother.    Antony is a 21 year old male with medical history significant for Fanconi anemia who is s/p BMT [06/2019] that was complicated by graft failure, and the second transplant [umbilical cord blood] in 08/2019.  He is completely engrafted.    Diarrhea  -since the past few months  -was living on his own at the time of onset  -occurs 2-3x/week  -consistency: soupy, apple sauce  -sometimes urgency  -no blood in stools  -no dietary association  -takes a pre-work out, total-war  -occurs mainly in the evening  -not associated with anxiousness    Abdominal pain  -described as a \"heat sensation\"  -lower quadrants  -experienced before diarrheal stools  -resolves with " Patient contacted to review recommendations per MJS. Patient verbalizes understanding, all questions answered at this time.    stooling  -times also seems to help  -no specific diet association    Milk intolerance  -when he consumes a large amount, will feel gurgly, gassy    Stooling History:  -Stool frequency: 1-7 per day, average 4  -Consistency: hard  -[place] stool scale: 1  -Large caliber stools: sometimes  -Difficulty/pain with defecation: No  -Difficulty with flushing of passed stools: No  -Blood in stool: No  -Fecal soiling: No  -not on a laxative currently    Diet History:  he is on a restricted diet: avoid eggs, causes abdominal pain, avoids jay, greasy foods  Daily water intake: 100-128 oz  Daily juice intake: none  Servings of fruits/vegetables/day: 2  Coughing with feeds: none  Choking on feeds: none  Gagging with feeds: none    Growth:  Poor growth, low BMI noted.    Red flag signs/symptoms:  The following red flag signs/symptoms are PRESENT: frequent mouth ulcers resolved at BMT    The following red flag signs/symptoms are ABSENT: blood in stools, red or swollen joints, eye redness or blurred vision, frequent mouth ulcers, unexplained rash, unexplained fever, unexplained weight loss.    Other:  -Abdominal pain: No  -Vomiting: No  -Nausea: No  -Hematemesis: No  -Tenesmus: Yes  -Perianal symptoms: had hemorrhoids at transplant  -Dysphagia: No  -Yellowish discoloration of skin/eyes: No  -Easy bleeding/bruising: No  -Excessive fatigue: No  -Excessive pruritus: No  -Anxiety/Depression: yes    Allergies: Jack is allergic to morphine; morphine hcl; and seasonal allergies.    Medications:   Current Outpatient Medications   Medication Sig Dispense Refill     ALPRAZolam (XANAX) 0.25 MG ODT Take 0.25 mg by mouth 3 times daily as needed Anxiety       cetirizine (ZYRTEC) 10 MG tablet Take 10 mg by mouth daily dispersible lingual PO daily       citalopram (CELEXA) 10 MG tablet Take 10 mg by mouth daily          Immunizations:  Immunization History   Administered Date(s) Administered     DTaP / Hep B / IPV 07/28/2020     HPV9 07/28/2020      Hib (PRP-T) 07/28/2020     Influenza Vaccine IM > 6 months Valent IIV4 10/21/2019     Meningococcal (Bexsero ) 07/28/2020     Meningococcal (Menveo ) 07/28/2020     Pneumo Conj 13-V (2010&after) 07/28/2020        Past Medical History:  I have reviewed this patient's past medical history today and updated it as appropriate.  Past Medical History:   Diagnosis Date     Allergic rhinitis      Aphthous stomatitis      Fanconi's anemia (H)     aplastic anemia     Fusarium infection (H)      Hypomagnesemia      Oral ulcer      Purpura (H)        Past Surgical History: I have reviewed this patient's past surgical history today and updated it as appropriate.  Past Surgical History:   Procedure Laterality Date     BONE MARROW BIOPSY       BONE MARROW BIOPSY, BONE SPECIMEN, NEEDLE/TROCAR Right 7/24/2018    Procedure: BIOPSY BONE MARROW;  Bone marrow biopsy;  Surgeon: Sharon Roman NP;  [facility]: [facility]      BONE MARROW BIOPSY, BONE SPECIMEN, NEEDLE/TROCAR Right 6/4/2019    Procedure: BIOPSY, BONE MARROW;  Surgeon: Albaro Wilkins PA-C;  Location: [facility]      BONE MARROW BIOPSY, BONE SPECIMEN, NEEDLE/TROCAR Left 7/19/2019    Procedure: BIOPSY, BONE MARROW, suture removal on right calf;  Surgeon: Sharon Rooney NP;  [facility]: [facility]      BONE MARROW BIOPSY, BONE SPECIMEN, NEEDLE/TROCAR N/A 8/5/2019    Procedure: Bone marrow biopsy;  Surgeon: Sharon Rooney NP;  [facility]      BONE MARROW BIOPSY, BONE SPECIMEN, NEEDLE/TROCAR Right 11/22/2019    Procedure: Bone marrow biopsy;  Surgeon: Dayna Bean NP;  [facility]      BONE MARROW BIOPSY, BONE SPECIMEN, NEEDLE/TROCAR N/A 2/4/2020    Procedure: Bone marrow biopsy;  Surgeon: Felicia Escobar PA-C;  [facility]      BONE MARROW BIOPSY, BONE SPECIMEN, NEEDLE/TROCAR Right 7/28/2020    Procedure: Bone marrow biopsy;  Surgeon: Dayna Bean NP;  [facility]       "BRONCHOSCOPY (RIGID OR FLEXIBLE), DIAGNOSTIC N/A 8/29/2019    Procedure: Flexible Bronchoscopy With Lavage;  Surgeon: Saud Loya MD;  [facility]     ESOPHAGOSCOPY, GASTROSCOPY, DUODENOSCOPY (EGD), COMBINED N/A 7/12/2019    Procedure: Upper endocopy with biopsy and Flexsigmoidoscopy with biopsy;  Surgeon: Yaritza Kwon MD;  [facility]      INSERT CATHETER VASCULAR ACCESS N/A 6/4/2019    Procedure: INSERTION, VASCULAR ACCESS CATHETER;  Surgeon: Nicole Jones PA-C;  [facility]      INSERT CATHETER VASCULAR ACCESS N/A 8/12/2019    Procedure: Non-tunneled fritz line placement;  Surgeon: Nicole Jones PA-C;  [facility]      INSERT PICC LINE N/A 8/29/2019    Procedure: Picc Line Insertion;  Surgeon: Kimani Chávez PA-C;  [facility]     IR CVC TUNNEL PLACEMENT > 5 YRS OF AGE  6/4/2019     IR CVC TUNNEL PLACEMENT > 5 YRS OF AGE  8/12/2019     IR CVC TUNNEL REMOVAL LEFT  11/22/2019     IR CVC TUNNEL REMOVAL RIGHT  8/9/2019     IR PICC PLACEMENT > 5 YRS OF AGE  8/29/2019     REMOVE CATHETER VASCULAR ACCESS N/A 8/9/2019    Procedure: Tunneled line removal;  Surgeon: Nicole Jones PA-C;  [facility]      REMOVE CATHETER VASCULAR ACCESS  11/22/2019    Procedure: tunneled line removal;  Surgeon: Yang Huffman MD;  [facility]      SIGMOIDOSCOPY FLEXIBLE N/A 7/12/2019    Procedure: Flexible sigmoidoscopy with biopsy;  Surgeon: Yaritza Kwon MD;  [facility]      TRANSPLANT      stem cell transplant X2        Family History:  I have reviewed this patient's family history today and updated it as appropriate.    Family History   Problem Relation Age of Onset     Celiac Disease No family hx of      Crohn's Disease No family hx of      Ulcerative Colitis No family hx of        Social History: Antony lives with his parents.    Physical Exam:    /63   Pulse 71   Ht 1.689 m (5' 6.5\")   Wt 56.2 kg (123 " lb 14.4 oz)   BMI 19.70 kg/m     Weight for age: Facility age limit for growth percentiles is 20 years.  Height for age: Facility age limit for growth percentiles is 20 years.  BMI for age: Facility age limit for growth percentiles is 20 years.  Weight for length: Facility age limit for growth percentiles is 20 years.    Physical Exam  Vitals signs reviewed.   Constitutional:       General: He is not in acute distress.     Appearance: He is not toxic-appearing.      Comments: Short stature   HENT:      Head: Atraumatic.      Right Ear: External ear normal.      Left Ear: External ear normal.      Nose: Nose normal.      Mouth/Throat:      Mouth: Mucous membranes are moist.      Pharynx: No oropharyngeal exudate.   Eyes:      General: No scleral icterus.        Right eye: No discharge.         Left eye: No discharge.      Conjunctiva/sclera: Conjunctivae normal.   Neck:      Musculoskeletal: Neck supple.   Cardiovascular:      Rate and Rhythm: Normal rate and regular rhythm.      Heart sounds: Normal heart sounds. No murmur.   Pulmonary:      Effort: Pulmonary effort is normal. No respiratory distress.      Breath sounds: Normal breath sounds.   Abdominal:      General: Bowel sounds are normal. There is no distension.      Palpations: Abdomen is soft. There is no mass.      Tenderness: There is no abdominal tenderness.   Musculoskeletal:         General: No deformity.   Skin:     General: Skin is warm and dry.      Findings: No rash.   Neurological:      General: No focal deficit present.      Mental Status: He is alert.   Psychiatric:         Behavior: Behavior normal.         Review of outside/previous results:  I personally reviewed results of laboratory evaluation, imaging studies and past medical records that were available during this outpatient visit.      Results for orders placed or performed during the hospital encounter of 07/05/21   X-ray Abdomen 1 vw     Status: None    Narrative    Exam: XR ABDOMEN 1  VIEW  7/5/2021 1:46 PM      History: evaluate stool burden; Lower abdominal pain    Comparison: 8/28/2019    Findings: Bowel gas pattern is nonobstructive. There is a moderate  amount stool through the colon. No pneumatosis, portal venous gas,  gross organomegaly, or abnormal calcification. No acute osseous  abnormality.      Impression    Impression: Nonobstructive bowel gas pattern with moderate stool  burden.    REYNA GUERRERO MD          SYSTEM ID:  [id number]   Results for orders placed or performed in visit on 07/05/21   General PFT Lab (Please always keep checked)     Status: None (Preliminary result)   Result Value Ref Range    FVC-Pred 4.46 L    FVC-Pre 4.20 L    FVC-%Pred-Pre 94 %    FEV1-Pre 4.03 L    FEV1-%Pred-Pre 104 %    FEV1FVC-Pred 87 %    FEV1FVC-Pre 96 %    FEFMax-Pred 9.31 L/sec    FEFMax-Pre 9.54 L/sec    FEFMax-%Pred-Pre 102 %    FEF2575-Pred 4.40 L/sec    FEF2575-Pre 5.84 L/sec    RGH0880-%Pred-Pre 132 %    ExpTime-Pre 6.77 sec    FIFMax-Pre 5.20 L/sec    VC-Pred 5.30 L    VC-Pre 4.14 L    VC-%Pred-Pre 78 %    IC-Pred 3.23 L    IC-Pre 3.00 L    IC-%Pred-Pre 92 %    ERV-Pred 2.07 L    ERV-Pre 1.14 L    ERV-%Pred-Pre 55 %    FEV1FEV6-Pred 85 %    FEV1FEV6-Pre 96 %    FRCPleth-Pred 3.09 L    FRCPleth-Pre 3.01 L    FRCPleth-%Pred-Pre 97 %    RVPleth-Pred 1.53 L    RVPleth-Pre 1.87 L    RVPleth-%Pred-Pre 122 %    TLCPleth-Pred 6.42 L    TLCPleth-Pre 6.01 L    TLCPleth-%Pred-Pre 93 %    DLCOunc-Pred 29.08 ml/min/mmHg    DLCOunc-Pre 28.59 ml/min/mmHg    DLCOunc-%Pred-Pre 98 %    VA-Pre 5.38 L    VA-%Pred-Pre 97 %    FEV1SVC-Pred 73 %    FEV1SVC-Pre 97 %   Results for orders placed or performed in visit on 07/05/21   DX Hip/Pelvis/Spine     Status: None    Narrative    HISTORY: Status post bone marrow transplant    COMPARISON: 7/27/2020    [age] 20.4 years.  Height: 66.0 inches  Weight: 123.5 pounds  Sex: Male  Ethnicity: White  Image quality: Adequate    Lumbar spine Z-score in region of L1-L4 = -1.1    L1-4 percent change: 10.9%     HIPS:  Mean total hip Z-score: -1.5  Mean total hip percent change: 8.6%   Left femoral neck Z-score = -1.7  Right femoral neck Z-score= -1.9     Total Body:  Chronological age Z-score: -1.8  Bone Mineral Density: 0.996 gm/cm2  Total Body percent change: 5.6    Body composition:  % body fat: 19.5%    COMPARISON TO PRIOR:  Percent change in the lumbar spine, hips, and total body is  significant accounting to the precision errors for this facility.     According to the ISCD position statements at www.iscd.org:  Z-scores are reported for premenopausal women and men under 50 years  of age.  Osteoporosis cannot be diagnosed in men under age 50 on the basis of  BMD alone.  Z-score less than -2.0 is below the expected range.  Z-scores greater than -2.0 is within the expected range.      Impression    IMPRESSION:    1. Normal bone mineral density.  2. Normal percent body fat.  3. Consider repeating DXA in 24 months, if clinically indicated.    SANJIV ARIAS MD          SYSTEM ID:  [id number]         Assessment:    Antony is a 21 year old male with Fanconi anemia s/p BMT x2, who presents today to discuss a few months of nonbloody diarrhea, occasional lower quadrant abdominal pain, hard stools [although Hemph[place] type IV is reported], sensation of tenesmus.    Patients with Fanconi Anemia are known to be at increased risk of developing neoplasms due to impaired DNA repair mechanism. There is increasing evidence of esophageal cancer in this population (Gatito CAMERON et al. Endoscopic findings and esophageal cancer incidence among Fanconi Anemia patients participating in an endoscopic surveillance program. Dig Liver Dis. 2019 Feb;51(2):242-246. doi: 10.1016/j.dld.2018.08.010. Epub 2018 Aug 18. PMID: 32652495). Some of the patients in this case series were initially found to have reflux esophagitis, but went on to develop squamous cell carcinoma of the esophagus.    Today we discussed the role of  endoscopic surveillance in the early detection of esophagitis and pre-malignant/malignant lesions.    Although an EGD alone was planned, given that Jack has experienced lower abdominal pain, tenesmus, diarrhea, a colonoscopy will be added onto the upcoming EGD.    Other possibilities for symptoms include dietary intolerance, excessive caffeine intake [from preworkout drink], constipation [due to history of hard stools].    Plan:  -will attempt to add on a colonoscopy with upcoming EGD  -maintain food-symptom diary to draw out possible dietary association  -abdominal X ray to evaluate stool burden, and to help us determine if Jack requires a bowel clean out and regular laxative    Orders today--  Orders Placed This Encounter   Procedures     X-ray Abdomen 1 vw       Follow up: Return in about 6 months (around 1/5/2022). Please call or return sooner should Jack become symptomatic.      Thank you for allowing me to participate in Jack's care.   If you have any questions during regular office hours, please contact the nurse line at [phone] or [phone].  If acute concerns arise after hours, you can call [phone] and ask to speak to the pediatric gastroenterologist on call.    If you have scheduling needs, please call the Call Center at [phone].   Outside lab and imaging results should be faxed to [phone].    Sincerely,    Klever Sarkar MD, [facility]    Pediatric Gastroenterology, Hepatology, and Nutrition  [facility]       I discussed the plan of care with Jack and his mother during today's office visit. We discussed: symptoms, differential diagnosis, diagnostic work up, treatment, potential side effects and complications, and follow up plan.  Questions were answered and contact information provided.    At least 45 minutes spent on the date of the encounter doing chart review, history and exam, documentation and further activities as  noted above.    CC  Copy to patient  Betty Carlos James  [address] [place] [address]    Patient Care Team:  Olive Mckeon MD as PCP - General (Pediatric Hematology-Oncology)  Werner Reddy as Referring Physician (Pediatric Hematology/Oncology)  Rossy Leigh, RN as BMT Nurse Coordinator (BMT - Pediatrics)  Karolyn Lau, RN as BMT Nurse Coordinator (BMT - Pediatrics)  Rossy Buchanan MD as Assigned Behavioral Health Provider  Florencia Medina MD as Assigned Surgical Provider

## 2021-11-17 ENCOUNTER — TELEPHONE (OUTPATIENT)
Dept: OBSTETRICS AND GYNECOLOGY | Facility: CLINIC | Age: 31
End: 2021-11-17

## 2021-11-17 NOTE — TELEPHONE ENCOUNTER
Dr Gutierrez pt called to request refill for Prozac 20mg. She is asking to switch pharmacies and have the prescription sent to [facility] in [place], [place], ph# [phone].

## 2021-11-18 RX ORDER — FLUOXETINE HYDROCHLORIDE 20 MG/1
20 CAPSULE ORAL DAILY
Qty: 90 CAPSULE | Refills: 0 | Status: SHIPPED | OUTPATIENT
Start: 2021-11-18 | End: 2022-03-24 | Stop reason: SDUPTHER

## 2021-11-29 RX ORDER — FLUOXETINE HYDROCHLORIDE 20 MG/1
20 CAPSULE ORAL DAILY
Qty: 90 CAPSULE | Refills: 0 | OUTPATIENT
Start: 2021-11-29

## 2022-03-24 ENCOUNTER — TELEPHONE (OUTPATIENT)
Dept: OBSTETRICS AND GYNECOLOGY | Facility: CLINIC | Age: 32
End: 2022-03-24

## 2022-03-24 RX ORDER — FLUOXETINE HYDROCHLORIDE 20 MG/1
20 CAPSULE ORAL DAILY
Qty: 90 CAPSULE | Refills: 1 | Status: SHIPPED | OUTPATIENT
Start: 2022-03-24 | End: 2022-10-04 | Stop reason: DRUGHIGH

## 2022-03-24 NOTE — TELEPHONE ENCOUNTER
Patient called, requesting a refill for FLUoxetine (PROzac) 20 MG capsule. Patient has no supply remaining. [facility] Ramon send to [facility] in [facility]. Please give patient a call once sent.

## 2022-04-28 ENCOUNTER — TELEPHONE (OUTPATIENT)
Dept: GASTROENTEROLOGY | Facility: CLINIC | Age: 32
End: 2022-04-28

## 2022-09-08 PROCEDURE — 99222 1ST HOSP IP/OBS MODERATE 55: CPT | Performed by: INTERNAL MEDICINE

## 2022-09-09 ENCOUNTER — ANESTHESIA (OUTPATIENT)
Dept: PERIOP | Facility: HOSPITAL | Age: 32
End: 2022-09-09

## 2022-09-09 ENCOUNTER — APPOINTMENT (OUTPATIENT)
Dept: OTHER | Facility: HOSPITAL | Age: 32
End: 2022-09-09

## 2022-09-09 ENCOUNTER — APPOINTMENT (OUTPATIENT)
Dept: GENERAL RADIOLOGY | Facility: HOSPITAL | Age: 32
End: 2022-09-09

## 2022-09-09 ENCOUNTER — HOSPITAL ENCOUNTER (INPATIENT)
Facility: HOSPITAL | Age: 32
LOS: 5 days | Discharge: HOME | End: 2022-09-14
Attending: INTERNAL MEDICINE | Admitting: STUDENT IN AN ORGANIZED HEALTH CARE EDUCATION/TRAINING PROGRAM

## 2022-09-09 ENCOUNTER — OUTSIDE FACILITY SERVICE (OUTPATIENT)
Dept: CARDIOLOGY | Facility: CLINIC | Age: 32
End: 2022-09-09

## 2022-09-09 ENCOUNTER — ANESTHESIA EVENT (OUTPATIENT)
Dept: PERIOP | Facility: HOSPITAL | Age: 32
End: 2022-09-09

## 2022-09-09 ENCOUNTER — APPOINTMENT (OUTPATIENT)
Dept: CARDIOLOGY | Facility: HOSPITAL | Age: 32
End: 2022-09-09

## 2022-09-09 DIAGNOSIS — I82.409 DEEP VEIN THROMBOSIS (DVT): ICD-10-CM

## 2022-09-09 DIAGNOSIS — I82.409 DVT (DEEP VENOUS THROMBOSIS): ICD-10-CM

## 2022-09-09 DIAGNOSIS — Z00.6 EXAMINATION FOR NORMAL COMPARISON FOR CLINICAL RESEARCH: ICD-10-CM

## 2022-09-09 PROBLEM — U07.1 DEEP VEIN THROMBOSIS (DVT) ASSOCIATED WITH COVID-19: Status: ACTIVE | Noted: 2022-09-09

## 2022-09-09 PROBLEM — I82.90 DEEP VEIN THROMBOSIS (DVT) ASSOCIATED WITH COVID-19: Status: ACTIVE | Noted: 2022-09-09

## 2022-09-09 PROBLEM — Z98.890 POSTOPERATIVE STATE: Status: RESOLVED | Noted: 2021-05-10 | Resolved: 2022-09-09

## 2022-09-09 PROBLEM — I26.99 ACUTE PULMONARY EMBOLISM (HCC): Status: ACTIVE | Noted: 2022-09-09

## 2022-09-09 LAB
ABO GROUP BLD: NORMAL
ANION GAP SERPL CALCULATED.3IONS-SCNC: 13 MMOL/L (ref 5–15)
ANION GAP SERPL CALCULATED.3IONS-SCNC: 13 MMOL/L (ref 5–15)
APTT PPP: 53.3 SECONDS (ref 60–90)
APTT PPP: 81.2 SECONDS (ref 60–90)
APTT PPP: 90 SECONDS (ref 60–90)
BASOPHILS # BLD AUTO: 0.06 10*3/MM3 (ref 0–0.2)
BASOPHILS NFR BLD AUTO: 0.5 % (ref 0–1.5)
BH CV ECHO MEAS - AO MAX PG: 12.7 MMHG
BH CV ECHO MEAS - AO MEAN PG: 6 MMHG
BH CV ECHO MEAS - AO ROOT DIAM: 3.1 CM
BH CV ECHO MEAS - AO V2 MAX: 178 CM/SEC
BH CV ECHO MEAS - AO V2 VTI: 33.5 CM
BH CV ECHO MEAS - AVA(I,D): 2.01 CM2
BH CV ECHO MEAS - EDV(CUBED): 125 ML
BH CV ECHO MEAS - EDV(MOD-SP2): 62.9 ML
BH CV ECHO MEAS - EDV(MOD-SP4): 84.8 ML
BH CV ECHO MEAS - EF(MOD-BP): 65.4 %
BH CV ECHO MEAS - EF(MOD-SP2): 65.2 %
BH CV ECHO MEAS - EF(MOD-SP4): 66 %
BH CV ECHO MEAS - ESV(CUBED): 32.8 ML
BH CV ECHO MEAS - ESV(MOD-SP2): 21.9 ML
BH CV ECHO MEAS - ESV(MOD-SP4): 28.8 ML
BH CV ECHO MEAS - FS: 36 %
BH CV ECHO MEAS - IVS/LVPW: 1.22 CM
BH CV ECHO MEAS - IVSD: 1.1 CM
BH CV ECHO MEAS - LA DIMENSION: 4 CM
BH CV ECHO MEAS - LV MASS(C)D: 182 GRAMS
BH CV ECHO MEAS - LV MAX PG: 3.9 MMHG
BH CV ECHO MEAS - LV MEAN PG: 2 MMHG
BH CV ECHO MEAS - LV V1 MAX: 99.2 CM/SEC
BH CV ECHO MEAS - LV V1 VTI: 21.4 CM
BH CV ECHO MEAS - LVIDD: 5 CM
BH CV ECHO MEAS - LVIDS: 3.2 CM
BH CV ECHO MEAS - LVOT AREA: 3.1 CM2
BH CV ECHO MEAS - LVOT DIAM: 2 CM
BH CV ECHO MEAS - LVPWD: 0.9 CM
BH CV ECHO MEAS - MV A MAX VEL: 90.7 CM/SEC
BH CV ECHO MEAS - MV DEC SLOPE: 442 CM/SEC2
BH CV ECHO MEAS - MV DEC TIME: 0.26 MSEC
BH CV ECHO MEAS - MV E MAX VEL: 99.2 CM/SEC
BH CV ECHO MEAS - MV E/A: 1.09
BH CV ECHO MEAS - MV P1/2T: 72.2 MSEC
BH CV ECHO MEAS - MVA(P1/2T): 3 CM2
BH CV ECHO MEAS - PA ACC TIME: 0.18 SEC
BH CV ECHO MEAS - PA PR(ACCEL): -2 MMHG
BH CV ECHO MEAS - PI END-D VEL: 101 CM/SEC
BH CV ECHO MEAS - SV(LVOT): 67.2 ML
BH CV ECHO MEAS - SV(MOD-SP2): 41 ML
BH CV ECHO MEAS - SV(MOD-SP4): 56 ML
BH CV ECHO MEAS - TAPSE (>1.6): 2.03 CM
BH CV VAS BP RIGHT ARM: NORMAL MMHG
BH CV XLRA - RV BASE: 4.2 CM
BH CV XLRA - RV LENGTH: 7.8 CM
BH CV XLRA - RV MID: 2.9 CM
BLD GP AB SCN SERPL QL: NEGATIVE
BUN SERPL-MCNC: 7 MG/DL (ref 6–20)
BUN SERPL-MCNC: 8 MG/DL (ref 6–20)
BUN/CREAT SERPL: 10.4 (ref 7–25)
BUN/CREAT SERPL: 8.9 (ref 7–25)
CALCIUM SPEC-SCNC: 8.7 MG/DL (ref 8.6–10.5)
CALCIUM SPEC-SCNC: 8.9 MG/DL (ref 8.6–10.5)
CHLORIDE SERPL-SCNC: 104 MMOL/L (ref 98–107)
CHLORIDE SERPL-SCNC: 104 MMOL/L (ref 98–107)
CO2 SERPL-SCNC: 21 MMOL/L (ref 22–29)
CO2 SERPL-SCNC: 23 MMOL/L (ref 22–29)
CREAT SERPL-MCNC: 0.77 MG/DL (ref 0.57–1)
CREAT SERPL-MCNC: 0.79 MG/DL (ref 0.57–1)
DEPRECATED RDW RBC AUTO: 47.3 FL (ref 37–54)
DEPRECATED RDW RBC AUTO: 48.5 FL (ref 37–54)
EGFRCR SERPLBLD CKD-EPI 2021: 102.7 ML/MIN/1.73
EGFRCR SERPLBLD CKD-EPI 2021: 105.9 ML/MIN/1.73
EOSINOPHIL # BLD AUTO: 0.18 10*3/MM3 (ref 0–0.4)
EOSINOPHIL NFR BLD AUTO: 1.6 % (ref 0.3–6.2)
ERYTHROCYTE [DISTWIDTH] IN BLOOD BY AUTOMATED COUNT: 16.5 % (ref 12.3–15.4)
ERYTHROCYTE [DISTWIDTH] IN BLOOD BY AUTOMATED COUNT: 16.7 % (ref 12.3–15.4)
FIBRINOGEN PPP-MCNC: 562 MG/DL (ref 220–470)
FIBRINOGEN PPP-MCNC: 576 MG/DL (ref 220–470)
GLUCOSE BLDC GLUCOMTR-MCNC: 93 MG/DL (ref 70–130)
GLUCOSE SERPL-MCNC: 114 MG/DL (ref 65–99)
GLUCOSE SERPL-MCNC: 92 MG/DL (ref 65–99)
HCG INTACT+B SERPL-ACNC: <0.1 MIU/ML
HCT VFR BLD AUTO: 31.7 % (ref 34–46.6)
HCT VFR BLD AUTO: 32 % (ref 34–46.6)
HGB BLD-MCNC: 9.8 G/DL (ref 12–15.9)
HGB BLD-MCNC: 9.9 G/DL (ref 12–15.9)
IMM GRANULOCYTES # BLD AUTO: 0.17 10*3/MM3 (ref 0–0.05)
IMM GRANULOCYTES NFR BLD AUTO: 1.5 % (ref 0–0.5)
INR PPP: 1.41 (ref 0.84–1.13)
IVRT: 53 MSEC
LEFT ATRIUM VOLUME INDEX: 13 ML/M2
LEFT ATRIUM VOLUME: 29.6 ML
LYMPHOCYTES # BLD AUTO: 3.75 10*3/MM3 (ref 0.7–3.1)
LYMPHOCYTES NFR BLD AUTO: 32.9 % (ref 19.6–45.3)
MAXIMAL PREDICTED HEART RATE: 189 BPM
MCH RBC QN AUTO: 24.4 PG (ref 26.6–33)
MCH RBC QN AUTO: 24.8 PG (ref 26.6–33)
MCHC RBC AUTO-ENTMCNC: 30.9 G/DL (ref 31.5–35.7)
MCHC RBC AUTO-ENTMCNC: 30.9 G/DL (ref 31.5–35.7)
MCV RBC AUTO: 79.1 FL (ref 79–97)
MCV RBC AUTO: 80.2 FL (ref 79–97)
MONOCYTES # BLD AUTO: 1.29 10*3/MM3 (ref 0.1–0.9)
MONOCYTES NFR BLD AUTO: 11.3 % (ref 5–12)
NEUTROPHILS NFR BLD AUTO: 5.95 10*3/MM3 (ref 1.7–7)
NEUTROPHILS NFR BLD AUTO: 52.2 % (ref 42.7–76)
NRBC BLD AUTO-RTO: 0.4 /100 WBC (ref 0–0.2)
PLATELET # BLD AUTO: 292 10*3/MM3 (ref 140–450)
PLATELET # BLD AUTO: 293 10*3/MM3 (ref 140–450)
PMV BLD AUTO: 9.2 FL (ref 6–12)
PMV BLD AUTO: 9.6 FL (ref 6–12)
POTASSIUM SERPL-SCNC: 4 MMOL/L (ref 3.5–5.2)
POTASSIUM SERPL-SCNC: 4.5 MMOL/L (ref 3.5–5.2)
PROTHROMBIN TIME: 17.2 SECONDS (ref 11.4–14.4)
RBC # BLD AUTO: 3.99 10*6/MM3 (ref 3.77–5.28)
RBC # BLD AUTO: 4.01 10*6/MM3 (ref 3.77–5.28)
RH BLD: POSITIVE
SODIUM SERPL-SCNC: 138 MMOL/L (ref 136–145)
SODIUM SERPL-SCNC: 140 MMOL/L (ref 136–145)
STRESS TARGET HR: 161 BPM
T&S EXPIRATION DATE: NORMAL
UFH PPP CHRO-ACNC: 0.15 IU/ML (ref 0.3–0.7)
UFH PPP CHRO-ACNC: 0.25 IU/ML (ref 0.3–0.7)
WBC NRBC COR # BLD: 11.4 10*3/MM3 (ref 3.4–10.8)
WBC NRBC COR # BLD: 12.08 10*3/MM3 (ref 3.4–10.8)

## 2022-09-09 PROCEDURE — 86850 RBC ANTIBODY SCREEN: CPT | Performed by: STUDENT IN AN ORGANIZED HEALTH CARE EDUCATION/TRAINING PROGRAM

## 2022-09-09 PROCEDURE — 99222 1ST HOSP IP/OBS MODERATE 55: CPT | Performed by: STUDENT IN AN ORGANIZED HEALTH CARE EDUCATION/TRAINING PROGRAM

## 2022-09-09 PROCEDURE — 25010000002 CEFAZOLIN 1-4 GM/50ML-% SOLUTION: Performed by: ANESTHESIOLOGY

## 2022-09-09 PROCEDURE — 25010000002 IOPAMIDOL 61 % SOLUTION: Performed by: STUDENT IN AN ORGANIZED HEALTH CARE EDUCATION/TRAINING PROGRAM

## 2022-09-09 PROCEDURE — 86901 BLOOD TYPING SEROLOGIC RH(D): CPT | Performed by: STUDENT IN AN ORGANIZED HEALTH CARE EDUCATION/TRAINING PROGRAM

## 2022-09-09 PROCEDURE — 84702 CHORIONIC GONADOTROPIN TEST: CPT | Performed by: INTERNAL MEDICINE

## 2022-09-09 PROCEDURE — 93306 TTE W/DOPPLER COMPLETE: CPT | Performed by: INTERNAL MEDICINE

## 2022-09-09 PROCEDURE — 86900 BLOOD TYPING SEROLOGIC ABO: CPT | Performed by: STUDENT IN AN ORGANIZED HEALTH CARE EDUCATION/TRAINING PROGRAM

## 2022-09-09 PROCEDURE — 85730 THROMBOPLASTIN TIME PARTIAL: CPT | Performed by: INTERNAL MEDICINE

## 2022-09-09 PROCEDURE — 80048 BASIC METABOLIC PNL TOTAL CA: CPT | Performed by: STUDENT IN AN ORGANIZED HEALTH CARE EDUCATION/TRAINING PROGRAM

## 2022-09-09 PROCEDURE — 82962 GLUCOSE BLOOD TEST: CPT

## 2022-09-09 PROCEDURE — 86923 COMPATIBILITY TEST ELECTRIC: CPT

## 2022-09-09 PROCEDURE — 25010000002 HEPARIN (PORCINE) PER 1000 UNITS: Performed by: ANESTHESIOLOGY

## 2022-09-09 PROCEDURE — 25010000002 ALTEPLASE PER 1 MG: Performed by: PHYSICIAN ASSISTANT

## 2022-09-09 PROCEDURE — 25010000002 ONDANSETRON PER 1 MG: Performed by: ANESTHESIOLOGY

## 2022-09-09 PROCEDURE — 25010000002 PROPOFOL 10 MG/ML EMULSION: Performed by: ANESTHESIOLOGY

## 2022-09-09 PROCEDURE — 85027 COMPLETE CBC AUTOMATED: CPT | Performed by: PHYSICIAN ASSISTANT

## 2022-09-09 PROCEDURE — 85520 HEPARIN ASSAY: CPT

## 2022-09-09 PROCEDURE — 25010000002 DEXAMETHASONE PER 1 MG: Performed by: ANESTHESIOLOGY

## 2022-09-09 PROCEDURE — 37212 THROMBOLYTIC VENOUS THERAPY: CPT | Performed by: STUDENT IN AN ORGANIZED HEALTH CARE EDUCATION/TRAINING PROGRAM

## 2022-09-09 PROCEDURE — 85025 COMPLETE CBC W/AUTO DIFF WBC: CPT | Performed by: INTERNAL MEDICINE

## 2022-09-09 PROCEDURE — 93306 TTE W/DOPPLER COMPLETE: CPT

## 2022-09-09 PROCEDURE — 85730 THROMBOPLASTIN TIME PARTIAL: CPT | Performed by: STUDENT IN AN ORGANIZED HEALTH CARE EDUCATION/TRAINING PROGRAM

## 2022-09-09 PROCEDURE — 25010000002 FENTANYL CITRATE (PF) 50 MCG/ML SOLUTION: Performed by: ANESTHESIOLOGY

## 2022-09-09 PROCEDURE — 80048 BASIC METABOLIC PNL TOTAL CA: CPT | Performed by: NURSE PRACTITIONER

## 2022-09-09 PROCEDURE — 85384 FIBRINOGEN ACTIVITY: CPT | Performed by: STUDENT IN AN ORGANIZED HEALTH CARE EDUCATION/TRAINING PROGRAM

## 2022-09-09 PROCEDURE — C1894 INTRO/SHEATH, NON-LASER: HCPCS | Performed by: STUDENT IN AN ORGANIZED HEALTH CARE EDUCATION/TRAINING PROGRAM

## 2022-09-09 PROCEDURE — 85384 FIBRINOGEN ACTIVITY: CPT | Performed by: PHYSICIAN ASSISTANT

## 2022-09-09 PROCEDURE — C1751 CATH, INF, PER/CENT/MIDLINE: HCPCS | Performed by: STUDENT IN AN ORGANIZED HEALTH CARE EDUCATION/TRAINING PROGRAM

## 2022-09-09 PROCEDURE — 3E04317 INTRODUCTION OF OTHER THROMBOLYTIC INTO CENTRAL VEIN, PERCUTANEOUS APPROACH: ICD-10-PCS | Performed by: STUDENT IN AN ORGANIZED HEALTH CARE EDUCATION/TRAINING PROGRAM

## 2022-09-09 PROCEDURE — 75825 VEIN X-RAY TRUNK: CPT | Performed by: STUDENT IN AN ORGANIZED HEALTH CARE EDUCATION/TRAINING PROGRAM

## 2022-09-09 PROCEDURE — 36010 PLACE CATHETER IN VEIN: CPT | Performed by: STUDENT IN AN ORGANIZED HEALTH CARE EDUCATION/TRAINING PROGRAM

## 2022-09-09 PROCEDURE — 99232 SBSQ HOSP IP/OBS MODERATE 35: CPT | Performed by: INTERNAL MEDICINE

## 2022-09-09 PROCEDURE — 25010000002 HEPARIN (PORCINE) PER 1000 UNITS: Performed by: STUDENT IN AN ORGANIZED HEALTH CARE EDUCATION/TRAINING PROGRAM

## 2022-09-09 PROCEDURE — 99223 1ST HOSP IP/OBS HIGH 75: CPT | Performed by: INTERNAL MEDICINE

## 2022-09-09 PROCEDURE — 75825 VEIN X-RAY TRUNK: CPT

## 2022-09-09 PROCEDURE — 85610 PROTHROMBIN TIME: CPT | Performed by: INTERNAL MEDICINE

## 2022-09-09 PROCEDURE — 25010000002 HEPARIN (PORCINE) PER 1000 UNITS

## 2022-09-09 PROCEDURE — B5191ZZ FLUOROSCOPY OF INFERIOR VENA CAVA USING LOW OSMOLAR CONTRAST: ICD-10-PCS | Performed by: STUDENT IN AN ORGANIZED HEALTH CARE EDUCATION/TRAINING PROGRAM

## 2022-09-09 PROCEDURE — 25010000002 HYDROMORPHONE HCL PF 500 MG/50ML SOLUTION: Performed by: STUDENT IN AN ORGANIZED HEALTH CARE EDUCATION/TRAINING PROGRAM

## 2022-09-09 PROCEDURE — 85520 HEPARIN ASSAY: CPT | Performed by: INTERNAL MEDICINE

## 2022-09-09 PROCEDURE — C1769 GUIDE WIRE: HCPCS | Performed by: STUDENT IN AN ORGANIZED HEALTH CARE EDUCATION/TRAINING PROGRAM

## 2022-09-09 PROCEDURE — 25010000002 HYDROMORPHONE PER 4 MG: Performed by: NURSE PRACTITIONER

## 2022-09-09 PROCEDURE — 25010000002 HEPARIN (PORCINE) 25000-0.45 UT/250ML-% SOLUTION: Performed by: INTERNAL MEDICINE

## 2022-09-09 RX ORDER — FAMOTIDINE 10 MG/ML
20 INJECTION, SOLUTION INTRAVENOUS ONCE
Status: COMPLETED | OUTPATIENT
Start: 2022-09-09 | End: 2022-09-09

## 2022-09-09 RX ORDER — SODIUM CHLORIDE 0.9 % (FLUSH) 0.9 %
10 SYRINGE (ML) INJECTION EVERY 12 HOURS SCHEDULED
Status: DISCONTINUED | OUTPATIENT
Start: 2022-09-09 | End: 2022-09-09 | Stop reason: HOSPADM

## 2022-09-09 RX ORDER — HEPARIN SODIUM 1000 [USP'U]/ML
2000 INJECTION, SOLUTION INTRAVENOUS; SUBCUTANEOUS ONCE
Status: COMPLETED | OUTPATIENT
Start: 2022-09-09 | End: 2022-09-09

## 2022-09-09 RX ORDER — PROPOFOL 10 MG/ML
VIAL (ML) INTRAVENOUS AS NEEDED
Status: DISCONTINUED | OUTPATIENT
Start: 2022-09-09 | End: 2022-09-09 | Stop reason: SURG

## 2022-09-09 RX ORDER — ROCURONIUM BROMIDE 10 MG/ML
INJECTION, SOLUTION INTRAVENOUS AS NEEDED
Status: DISCONTINUED | OUTPATIENT
Start: 2022-09-09 | End: 2022-09-09 | Stop reason: SURG

## 2022-09-09 RX ORDER — ONDANSETRON 2 MG/ML
INJECTION INTRAMUSCULAR; INTRAVENOUS AS NEEDED
Status: DISCONTINUED | OUTPATIENT
Start: 2022-09-09 | End: 2022-09-09 | Stop reason: SURG

## 2022-09-09 RX ORDER — LIDOCAINE HYDROCHLORIDE 10 MG/ML
0.5 INJECTION, SOLUTION EPIDURAL; INFILTRATION; INTRACAUDAL; PERINEURAL ONCE AS NEEDED
Status: DISCONTINUED | OUTPATIENT
Start: 2022-09-09 | End: 2022-09-09 | Stop reason: HOSPADM

## 2022-09-09 RX ORDER — CEFAZOLIN SODIUM 1 G/50ML
INJECTION, SOLUTION INTRAVENOUS AS NEEDED
Status: DISCONTINUED | OUTPATIENT
Start: 2022-09-09 | End: 2022-09-09 | Stop reason: SURG

## 2022-09-09 RX ORDER — SODIUM CHLORIDE, SODIUM LACTATE, POTASSIUM CHLORIDE, CALCIUM CHLORIDE 600; 310; 30; 20 MG/100ML; MG/100ML; MG/100ML; MG/100ML
9 INJECTION, SOLUTION INTRAVENOUS CONTINUOUS
Status: DISCONTINUED | OUTPATIENT
Start: 2022-09-09 | End: 2022-09-09

## 2022-09-09 RX ORDER — HEPARIN SODIUM 1000 [USP'U]/ML
4000 INJECTION, SOLUTION INTRAVENOUS; SUBCUTANEOUS AS NEEDED
Status: DISCONTINUED | OUTPATIENT
Start: 2022-09-09 | End: 2022-09-09

## 2022-09-09 RX ORDER — SODIUM CHLORIDE 9 MG/ML
35 INJECTION, SOLUTION INTRAVENOUS CONTINUOUS
Status: DISCONTINUED | OUTPATIENT
Start: 2022-09-09 | End: 2022-09-10

## 2022-09-09 RX ORDER — HEPARIN SODIUM 1000 [USP'U]/ML
2000 INJECTION, SOLUTION INTRAVENOUS; SUBCUTANEOUS AS NEEDED
Status: DISCONTINUED | OUTPATIENT
Start: 2022-09-09 | End: 2022-09-09

## 2022-09-09 RX ORDER — SODIUM CHLORIDE, SODIUM LACTATE, POTASSIUM CHLORIDE, CALCIUM CHLORIDE 600; 310; 30; 20 MG/100ML; MG/100ML; MG/100ML; MG/100ML
125 INJECTION, SOLUTION INTRAVENOUS CONTINUOUS
Status: DISCONTINUED | OUTPATIENT
Start: 2022-09-09 | End: 2022-09-10

## 2022-09-09 RX ORDER — HEPARIN SODIUM 10000 [USP'U]/100ML
18 INJECTION, SOLUTION INTRAVENOUS
Status: DISCONTINUED | OUTPATIENT
Start: 2022-09-09 | End: 2022-09-09

## 2022-09-09 RX ORDER — HYDROXYZINE PAMOATE 25 MG/1
25 CAPSULE ORAL DAILY
Status: ON HOLD | COMMUNITY
End: 2022-09-09

## 2022-09-09 RX ORDER — ONDANSETRON 2 MG/ML
4 INJECTION INTRAMUSCULAR; INTRAVENOUS ONCE AS NEEDED
Status: DISCONTINUED | OUTPATIENT
Start: 2022-09-09 | End: 2022-09-10 | Stop reason: HOSPADM

## 2022-09-09 RX ORDER — LIDOCAINE HYDROCHLORIDE 20 MG/ML
INJECTION, SOLUTION INFILTRATION; PERINEURAL AS NEEDED
Status: DISCONTINUED | OUTPATIENT
Start: 2022-09-09 | End: 2022-09-09 | Stop reason: SURG

## 2022-09-09 RX ORDER — OXYCODONE HYDROCHLORIDE AND ACETAMINOPHEN 5; 325 MG/1; MG/1
1 TABLET ORAL EVERY 6 HOURS PRN
COMMUNITY
End: 2022-09-14 | Stop reason: HOSPADM

## 2022-09-09 RX ORDER — FENTANYL CITRATE 50 UG/ML
50 INJECTION, SOLUTION INTRAMUSCULAR; INTRAVENOUS
Status: DISCONTINUED | OUTPATIENT
Start: 2022-09-09 | End: 2022-09-09

## 2022-09-09 RX ORDER — FLUOXETINE HYDROCHLORIDE 20 MG/1
20 CAPSULE ORAL DAILY
Status: DISCONTINUED | OUTPATIENT
Start: 2022-09-10 | End: 2022-09-14 | Stop reason: HOSPADM

## 2022-09-09 RX ORDER — MAGNESIUM HYDROXIDE 1200 MG/15ML
LIQUID ORAL AS NEEDED
Status: DISCONTINUED | OUTPATIENT
Start: 2022-09-09 | End: 2022-09-09 | Stop reason: HOSPADM

## 2022-09-09 RX ORDER — FAMOTIDINE 20 MG/1
20 TABLET, FILM COATED ORAL ONCE
Status: DISCONTINUED | OUTPATIENT
Start: 2022-09-09 | End: 2022-09-09 | Stop reason: HOSPADM

## 2022-09-09 RX ORDER — HEPARIN SODIUM 1000 [USP'U]/ML
INJECTION, SOLUTION INTRAVENOUS; SUBCUTANEOUS AS NEEDED
Status: DISCONTINUED | OUTPATIENT
Start: 2022-09-09 | End: 2022-09-09 | Stop reason: SURG

## 2022-09-09 RX ORDER — SODIUM CHLORIDE 9 MG/ML
INJECTION, SOLUTION INTRAVENOUS CONTINUOUS PRN
Status: DISCONTINUED | OUTPATIENT
Start: 2022-09-09 | End: 2022-09-09 | Stop reason: SURG

## 2022-09-09 RX ORDER — HYDROMORPHONE HYDROCHLORIDE 1 MG/ML
0.5 INJECTION, SOLUTION INTRAMUSCULAR; INTRAVENOUS; SUBCUTANEOUS
Status: DISCONTINUED | OUTPATIENT
Start: 2022-09-09 | End: 2022-09-09

## 2022-09-09 RX ORDER — MIDAZOLAM HYDROCHLORIDE 1 MG/ML
1 INJECTION INTRAMUSCULAR; INTRAVENOUS
Status: DISCONTINUED | OUTPATIENT
Start: 2022-09-09 | End: 2022-09-09

## 2022-09-09 RX ORDER — SODIUM CHLORIDE 0.9 % (FLUSH) 0.9 %
10 SYRINGE (ML) INJECTION EVERY 12 HOURS SCHEDULED
Status: DISCONTINUED | OUTPATIENT
Start: 2022-09-09 | End: 2022-09-14 | Stop reason: HOSPADM

## 2022-09-09 RX ORDER — PANTOPRAZOLE SODIUM 40 MG/10ML
40 INJECTION, POWDER, LYOPHILIZED, FOR SOLUTION INTRAVENOUS
Status: DISCONTINUED | OUTPATIENT
Start: 2022-09-10 | End: 2022-09-13

## 2022-09-09 RX ORDER — DEXAMETHASONE SODIUM PHOSPHATE 10 MG/ML
INJECTION INTRAMUSCULAR; INTRAVENOUS AS NEEDED
Status: DISCONTINUED | OUTPATIENT
Start: 2022-09-09 | End: 2022-09-09 | Stop reason: SURG

## 2022-09-09 RX ORDER — SODIUM CHLORIDE 9 MG/ML
35 INJECTION, SOLUTION INTRAVENOUS CONTINUOUS
Status: DISCONTINUED | OUTPATIENT
Start: 2022-09-09 | End: 2022-09-09

## 2022-09-09 RX ORDER — NALOXONE HCL 0.4 MG/ML
0.1 VIAL (ML) INJECTION
Status: DISCONTINUED | OUTPATIENT
Start: 2022-09-09 | End: 2022-09-10

## 2022-09-09 RX ORDER — HYDROMORPHONE HYDROCHLORIDE 1 MG/ML
0.5 INJECTION, SOLUTION INTRAMUSCULAR; INTRAVENOUS; SUBCUTANEOUS ONCE
Status: COMPLETED | OUTPATIENT
Start: 2022-09-09 | End: 2022-09-09

## 2022-09-09 RX ORDER — ALPRAZOLAM 0.25 MG/1
0.25 TABLET ORAL ONCE
Status: COMPLETED | OUTPATIENT
Start: 2022-09-09 | End: 2022-09-09

## 2022-09-09 RX ORDER — FENTANYL CITRATE 50 UG/ML
INJECTION, SOLUTION INTRAMUSCULAR; INTRAVENOUS AS NEEDED
Status: DISCONTINUED | OUTPATIENT
Start: 2022-09-09 | End: 2022-09-09 | Stop reason: SURG

## 2022-09-09 RX ORDER — SODIUM CHLORIDE 0.9 % (FLUSH) 0.9 %
10 SYRINGE (ML) INJECTION AS NEEDED
Status: DISCONTINUED | OUTPATIENT
Start: 2022-09-09 | End: 2022-09-10

## 2022-09-09 RX ORDER — SODIUM CHLORIDE 0.9 % (FLUSH) 0.9 %
10 SYRINGE (ML) INJECTION AS NEEDED
Status: DISCONTINUED | OUTPATIENT
Start: 2022-09-09 | End: 2022-09-09 | Stop reason: HOSPADM

## 2022-09-09 RX ORDER — HEPARIN SODIUM 10000 [USP'U]/100ML
1000 INJECTION, SOLUTION INTRAVENOUS
Status: DISCONTINUED | OUTPATIENT
Start: 2022-09-09 | End: 2022-09-10

## 2022-09-09 RX ADMIN — Medication 10 ML: at 20:00

## 2022-09-09 RX ADMIN — LIDOCAINE HYDROCHLORIDE 50 MG: 20 INJECTION, SOLUTION INFILTRATION; PERINEURAL at 16:48

## 2022-09-09 RX ADMIN — HEPARIN SODIUM 2000 UNITS: 1000 INJECTION, SOLUTION INTRAVENOUS; SUBCUTANEOUS at 13:51

## 2022-09-09 RX ADMIN — ALTEPLASE 1 MG/HR: KIT at 20:08

## 2022-09-09 RX ADMIN — FENTANYL CITRATE 50 MCG: 50 INJECTION, SOLUTION INTRAMUSCULAR; INTRAVENOUS at 16:56

## 2022-09-09 RX ADMIN — ONDANSETRON 4 MG: 2 INJECTION INTRAMUSCULAR; INTRAVENOUS at 18:16

## 2022-09-09 RX ADMIN — FENTANYL CITRATE 50 MCG: 50 INJECTION, SOLUTION INTRAMUSCULAR; INTRAVENOUS at 16:48

## 2022-09-09 RX ADMIN — HEPARIN SODIUM 18 UNITS/KG/HR: 10000 INJECTION, SOLUTION INTRAVENOUS at 13:50

## 2022-09-09 RX ADMIN — HYDROMORPHONE HYDROCHLORIDE 0.5 MG: 1 INJECTION, SOLUTION INTRAMUSCULAR; INTRAVENOUS; SUBCUTANEOUS at 20:19

## 2022-09-09 RX ADMIN — HEPARIN SODIUM 5000 UNITS: 1000 INJECTION, SOLUTION INTRAVENOUS; SUBCUTANEOUS at 17:47

## 2022-09-09 RX ADMIN — FAMOTIDINE 20 MG: 10 INJECTION INTRAVENOUS at 16:33

## 2022-09-09 RX ADMIN — ROCURONIUM BROMIDE 20 MG: 10 INJECTION INTRAVENOUS at 17:38

## 2022-09-09 RX ADMIN — PROPOFOL 50 MG: 10 INJECTION, EMULSION INTRAVENOUS at 17:41

## 2022-09-09 RX ADMIN — SODIUM CHLORIDE: 9 INJECTION, SOLUTION INTRAVENOUS at 16:51

## 2022-09-09 RX ADMIN — FENTANYL CITRATE 50 MCG: 50 INJECTION, SOLUTION INTRAMUSCULAR; INTRAVENOUS at 19:03

## 2022-09-09 RX ADMIN — HYDROMORPHONE HYDROCHLORIDE: 10 INJECTION, SOLUTION INTRAMUSCULAR; INTRAVENOUS; SUBCUTANEOUS at 20:41

## 2022-09-09 RX ADMIN — SODIUM CHLORIDE, POTASSIUM CHLORIDE, SODIUM LACTATE AND CALCIUM CHLORIDE 125 ML/HR: 600; 310; 30; 20 INJECTION, SOLUTION INTRAVENOUS at 20:56

## 2022-09-09 RX ADMIN — SODIUM CHLORIDE, POTASSIUM CHLORIDE, SODIUM LACTATE AND CALCIUM CHLORIDE 9 ML/HR: 600; 310; 30; 20 INJECTION, SOLUTION INTRAVENOUS at 16:33

## 2022-09-09 RX ADMIN — PROPOFOL 200 MG: 10 INJECTION, EMULSION INTRAVENOUS at 16:48

## 2022-09-09 RX ADMIN — ROCURONIUM BROMIDE 50 MG: 10 INJECTION INTRAVENOUS at 16:48

## 2022-09-09 RX ADMIN — DEXAMETHASONE SODIUM PHOSPHATE 8 MG: 10 INJECTION INTRAMUSCULAR; INTRAVENOUS at 17:28

## 2022-09-09 RX ADMIN — SUGAMMADEX 200 MG: 100 INJECTION, SOLUTION INTRAVENOUS at 18:14

## 2022-09-09 RX ADMIN — CEFAZOLIN SODIUM 2 G: 1 INJECTION, SOLUTION INTRAVENOUS at 17:20

## 2022-09-09 RX ADMIN — ALPRAZOLAM 0.25 MG: 0.25 TABLET ORAL at 23:14

## 2022-09-09 RX ADMIN — PROPOFOL 50 MG: 10 INJECTION, EMULSION INTRAVENOUS at 16:56

## 2022-09-09 NOTE — PLAN OF CARE
Cardiothoracic and Vascular Surgery Postoperative Plan of Care Note    tPA infusion 1 mg/h via catheter  Unfractionated heparin infusion 500 units/h via sheath  Keep n.p.o.  Type and cross 2 PRBC  IV fluids LR or similar at 125 cc/h  Continue Stanley catheter do not remove  CBC and fibrinogen every 6 hours  If fibrinogen is less than 150, then decrease tPA infusion to 0.5 mg/h via catheter  If fibrinogen is less than 100, then stop tPA and start therapeutic heparin dosing via sheath  Plan for lysis check venogram, mechanical thrombectomy, and possible stent in hybrid OR on September 10, 2022

## 2022-09-09 NOTE — ANESTHESIA PREPROCEDURE EVALUATION
Anesthesia Evaluation     Patient summary reviewed and Nursing notes reviewed   no history of anesthetic complications:  NPO Solid Status: > 8 hours  NPO Liquid Status: > 8 hours           Airway   Mallampati: II  TM distance: >3 FB  Neck ROM: full  No difficulty expected  Comment: Previous grade 1 view with Mac 3 blade  Dental    (+) poor dentition    Pulmonary - normal exam   (+) pulmonary embolism,     ROS comment: covid 2.5 weeks ago    Cardiovascular - normal exam    ECG reviewed    (+) hypertension, DVT current,     ROS comment: Echo Interpretation Summary 9/9/22    · Left ventricular ejection fraction appears to be 61 - 65%.  · Normal right ventricular cavity size, wall thickness, systolic function and septal motion noted.  · The tricuspid valve is structurally normal with no significant regurgitation present. Insufficient TR velocity profile to estimate the right ventricular systolic pressure.         large burden IVC and left iliofemoral DVT         Neuro/Psych  (+) psychiatric history Anxiety and Depression,    (-) seizures, TIA, CVA  GI/Hepatic/Renal/Endo    (+) morbid obesity, GERD,      Musculoskeletal     Abdominal    Substance History      OB/GYN          Other   blood dyscrasia anemia,     ROS/Med Hx Other: Questionable clotting disorder                Anesthesia Plan    ASA 3 - emergent     general     intravenous induction     Anesthetic plan, risks, benefits, and alternatives have been provided, discussed and informed consent has been obtained with: patient.    Use of blood products discussed with patient  Consented to blood products.   Plan discussed with CRNA.        CODE STATUS:    Level Of Support Discussed With: Patient  Code Status (Patient has no pulse and is not breathing): CPR (Attempt to Resuscitate)  Medical Interventions (Patient has pulse or is breathing): Full Support

## 2022-09-09 NOTE — OP NOTE
DATE OF PROCEDURE: September 9, 2022     PREOPERATIVE DIAGNOSES:  Bilateral pulmonary emboli  COVID-19 infection  Left iliofemoral DVT  IVC DVT  Left lower extremity engorgement, pain     POSTOPERATIVE DIAGNOSES:    Same     PROCEDURES PERFORMED:    1.  Supervision and interpretation of radiography fluoroscopy   2.  Ultrasound-guided needle access of the left popliteal vein   3. IVC venogram  4.  Third order cannulation of inferior vena cava  5. Initiation of TPA at left iliofemoral venous system and IVC    SURGEON:   MD Addison Barraza MD Gary F. Earle, MD     ASSISTANTS:    None    Circulator: Haase, Sherri L, RN; Alis Burton RN  Scrub Person: Roland Palacios  Nursing Assistant: Ricky Edmond PCT; Cecilia Quiñonez      ANESTHESIA: General endotracheal anesthesia      ESTIMATED BLOOD LOSS: 5 mL.      Fluoroscopy time less than 1 minute    Total contrast used less than 30 cc     INDICATIONS: This is a 32-year-old woman with a history of obesity and recent COVID-19 infection who presented with recent bilateral pulmonary emboli and complex left iliofemoral DVT that extended into the inferior vena cava which was complicated by severe pain and edema at the left lower extremity.  This was discussed with her cardiologist Dr. Valenzuela and Dr. Martinez as well as multiple members of the cardiothoracic and vascular surgery division Dr. Lamas, Dr. Herman, and the patient was felt to be a reasonable candidate for ileofemoral DVT lysis and mechanical thrombectomy to prevent long-term sequelae of post thrombotic syndrome. The risks and benefits of surgery were discussed with the patient including pain, bleeding, infection, myocardial infarction and death. The patient understood these risks and wished to proceed with surgery.      DESCRIPTION OF PROCEDURE:  The patient was taken to the operating room and placed under general endotracheal anesthesia.  A Stanley catheter was placed in urethra.   The patient was laid in the prone position and the bilateral posterior thighs and legs were prepped and draped in the usual sterile fashion.  A timeout was performed including the patient's name, procedure and antibiotic administration.  The patient was given 5000 units of unfractionated heparin IV, in addition to her heparin drip which had not been stopped.  I used an ultrasound to identify the popliteal vein on the left side, and obtain needle access with a single wall puncture.  I placed a 6 Macedonian sheath, and advanced a Glidewire advantage into the IVC after navigating through clot.  I performed venography of the IVC, and identified the renal veins which appear to be around the level of T12/L1.  We then advanced a UniFuse catheter into the infrarenal IVC, which extended down into the left iliac vein.  We connected the UniFuse wire, and then connected our system to a tPA infusion so that 1 mg/h of tPA would be infused.  We then connected the sheath to 500 cc of unfractionated heparin per hour.  We secured the system about the posterior leg with stitches and an Ioban in a sterile fashion.  All needle, instrument, and sponge counts were correct at the case completion.  I was present for the entire procedure.    Cooper Zhao M.D.   Cardiothoracic and Vascular Surgeon  [facility]

## 2022-09-09 NOTE — NURSING NOTE
ACC REVIEW REPORT: [facility]        PATIENT NAME: Urszula Gonzalez    PATIENT ID: [id number]        COVID-19 ACC SCREENING       DOES THE PATIENT HAVE A FEVER GREATER THAN OR EQUAL .4: NO    IS THE PATIENT EXPERIENCING SHORTNESS OF BREATH: NO    DOES THE PATIENT HAVE A COUGH: NO  DOES THE PATIENT HAVE ANY OF THE FOLLOWING RISK FACTORS:    EXPOSURE TO SUSPECTED OR KNOWN COVID-19: NO    RECENT TRAVEL HISTORY TO ENDEMIC AREA (DOMESTIC/LOCAL): NO    IS THE PATIENT A HEALTHCARE WORKER: NO    HAS THE PATIENT EXPERIENCED A LOSS OF SENSE OF TASTE OR SMELL: NO    HAS THE PATIENT BEEN TESTED FOR COVID-19: YES, NEGATIVE    DATE TESTED: 9/3/22    LAB TESTING SENT TO: [facility]          BED: CVOU    BED TYPE: CVOU    BED GIVEN TO: ALONZO DESIR    TIME BED GIVEN: 0645    TODAY'S DATE: 9/9/2022    TRANSFER DATE: 9/9/22    ETA: WILL CALL    TRANSFERRING FACILITY: [facility]    TRANSFERRING FACILITY PHONE # : [phone]    TRANSFERRING MD: JUSTIN CALLED TO HAVE PT BROUGHT FROM [facility] TO CATH LAB FOR PROCEDURE    DATE/TIME REQUEST RECEIVED: 9/8/22 [facility]3    [facility] RN: JUAN RICHARDS RN    REPORT FROM: ALONZO DESIR    TIME REPORT TAKEN: 0645    DIAGNOSIS: LLE DVT AND PE    REASON FOR TRANSFER TO [facility]: HIGHER LEVEL OF CARE    TRANSPORTATION: EMS GROUND    CLINICAL REASON FOR TRANSFER TO [facility]:  PROCEDURE IN CATH LAB    CLINICAL INFORMATION    HEIGHT: 5'6    WEIGHT: 250 LB    ALLERGIES: CIPRO    INFECTIOUS DISEASE: NONE    ISOLATION: NONE    VITAL SIGNS:   TIME: 0558  TEMP: 98.3  PULSE: 103  B/P: 118/68  RESP: 18        LAB INFORMATION:   ALT 36  AST 55      MEDS/IV FLUIDS:   20 LW WITH HEPARIN @ 19U/KG/HR    LAST PTT AT 0144 THERAPEUTIC, NO CHANGES MADE    9/8/22 WARFARIN 5 MG PO GIVEN      CARDIAC SYSTEM:    CHEST PAIN: NONE    RATE: 103    RHYTHM: SR    Is patient taking or has patient been given any drugs that could increase bleeding? HEPARIN GTT AND WARFARIN    CARDIAC NOTES:   PT ADMITTED TO [facility] 9/3 FOR  PE AND DVT LLE. COMING TO [facility] FOR PROCEDURE IN CATH LAB.      RESPIRATORY SYSTEM:    LUNG SOUNDS: CLEAR    OXYGEN: ROOM AIR    O2 SAT: 98%      CNS/MUSCULOSKELETAL    ALERT AND ORIENTED:  PT A&O    GI//GY      ABDOMINAL PAIN: NONE    VOMITING: NONE    DIARRHEA: NONE    NAUSEA: NONE    PAST MEDICAL HISTORY:   TONSILLECTOMY  CHOLEY  GASTRIC SLEEVE  HTN  GERD  DEPRESSION  LLE DVT      Sindy Crane RN  9/9/2022  06:41 EDT

## 2022-09-09 NOTE — H&P
[facility] Consult/H&P Note      Referring Provider: Rafiq Allen MD  Primary Provider:  Cha Fernández APRN  Reason for Consultation: DVT/PE    PROBLEM LIST:  1. DVT/PE  a. Diagnosed with bilateral PE, LLE DVT, 2022  b. CT Abd/pelvis, 9/3/22 [facility]: acute DVT within the left common femoral vein, left external iliac vein, left common iliac vein extending into the infrarenal IVC and almost to the level of renal veins.  c. Echo 22: pending  2. POTS during second pregnancy  3. Orthostatic tachycardia and orthostatic hypotension  4. Hypertension  5. Gestational hypertension  6. GERD  7. Ovarian cyst  8. Depression  9. Surgical Hx:   a. Tonsillectomy  b. Cholecystectomy  c.  x 3  d. Tubal ligation  e. Gastric sleeve      HISTORY OF PRESENT ILLNESS:  Urszula Gonzalez is a 32 y.o. female with the above noted pmhx who presented to [facility] 9/3/2022 for worsening left lower extremity pain. Of note, she had been recently diagnosed with bilateral PE and left lower extremity DVT. She was treated with heparin gtt to Eliquis. She improved and was subsequently discharged home. We have no documentation of that admission. She was only home for a few hours before her left leg pain significantly worsened and prompted her return to the ED. At OSH, she had CT A/P as noted above which revealed an extensive left lower extremity DVT extending from left common femoral vein to almost her renal veins. She was subsequently re-admitted and started on Coumadin given failure of Eliquis. Today she has been transferred to [facility] for consideration of possible intervention to [facility].       REVIEW OF SYSTEMS  Pertinent positives are listed in the HPI and all other ROS are negative.      SOCIAL HISTORY:   reports that she has never smoked. She has never used smokeless tobacco. She reports previous alcohol use. She reports that she does not use drugs.     FAMILY HISTORY:  family history includes  Cancer in her maternal grandmother and paternal grandfather; Cirrhosis in her maternal grandmother; Diabetes in her maternal grandmother; Graves' disease in her mother; Hypertension in her brother, brother, and father; Myasthenia gravis in her mother; No Known Problems in her daughter, paternal grandmother, sister, and son; Thyroid disease in her mother.     CURRENT MEDICATIONS:      Current Facility-Administered Medications:   •  heparin (porcine) injection 2,000 Units, 2,000 Units, Intravenous, PRN, Terrence Green MD  •  heparin (porcine) injection 4,000 Units, 4,000 Units, Intravenous, PRN, Terrence Green MD  •  heparin 31980 units/250 mL (100 units/mL) in 0.45 % NaCl infusion, 18 Units/kg/hr, Intravenous, Titrated, Terrence Green MD  •  Pharmacy to Dose Heparin, , Does not apply, Continuous PRN, Terrence Green MD  •  sodium chloride 0.9 % flush 10 mL, 10 mL, Intravenous, Q12H, Kuns-Oneil, Sumi B, APRN  •  sodium chloride 0.9 % flush 10 mL, 10 mL, Intravenous, PRN, Kuns-Oneil, Sumi B, APRN     Allergies:  Ciprofloxacin    Objective     Vital Sign Min/Max for last 24 hours  Temp  Min: 97.2 °F (36.2 °C)  Max: 97.2 °F (36.2 °C)   BP  Min: 128/74  Max: 150/105   Pulse  Min: 90  Max: 90   Resp  Min: 18  Max: 18   SpO2  Min: 96 %  Max: 97 %   No data recorded   Weight  Min: 122 kg (269 lb)  Max: 122 kg (269 lb)         PHYSICAL EXAM:  GENERAL: This is a well-developed, well-nourished, female who is in no acute distress.   SKIN: Pink and warm without rash or abnormality noted.   HEENT: Head is normocephalic and atraumatic.   LUNGS: Clear to auscultation bilaterally without wheezing, rhonchi, or rales noted.   CARDIOVASCULAR: The heart has a regular rate with a normal S1 and S2. There is no murmur, gallop, rub, or click appreciated. The PMI is nondisplaced. Carotid upstrokes are 2+ and symmetrical without bruits.   ABDOMEN: Soft and nondistended with positive bowel sounds x4.  The patient denies tenderness of palpitation.   PERIPHERAL VASCULAR:  Posterior tibial and dorsalis pedis pulses are 1+ and symmetrical. There is 2+ peripheral edema to LLE.   PSYCH: Normal mood and affect.      EKG:    Tele: NSR    LABS:      Lab Results   Component Value Date    WBC 15.28 (H) 04/22/2021    HGB 8.9 (L) 04/23/2021    HCT 30.1 (L) 04/23/2021    MCV 79.9 04/22/2021     04/22/2021     Lab Results   Component Value Date    GLUCOSE 120 (H) 03/10/2021    BUN 7 03/10/2021    CREATININE 0.60 04/21/2021    EGFRIFNONA 131 03/10/2021    BCR 12.7 03/10/2021    K 3.4 (L) 03/10/2021    CO2 18.0 (L) 03/10/2021    CALCIUM 9.0 03/10/2021    ALBUMIN 3.50 03/10/2021    AST 11 04/21/2021    ALT 6 04/21/2021     Lab Results   Component Value Date    TROPONINT <0.010 03/10/2021     No results found for: INR, PROTIME  No results found for: CHOL, CHLPL, TRIG, HDL, LDL, LDLDIRECT     Results Review: I reviewed the patient's new clinical results.      Echo:  · Left ventricular ejection fraction appears to be 61 - 65%.  · Normal right ventricular cavity size, wall thickness, systolic function and septal motion noted.  · The tricuspid valve is structurally normal with no significant regurgitation present. Insufficient TR velocity profile to estimate the right ventricular systolic pressure.        ASSESSMENT:    1.  DVT/PE  a. Diagnosed with bilateral PE, LLE DVT, 9/2022  b. CT Abd/pelvis, 9/3/22 [facility]: acute DVT within the left common femoral vein, left external iliac vein, left common iliac vein extending into the infrarenal IVC and almost to the level of renal veins.  2. POTS during second pregnancy  3. Orthostatic tachycardia and orthostatic hypotension  4. Hypertension  5. COVID infection early August      PLAN:    1. CTA of the chest abdomen pelvis have been uploaded to PACS for review  2. Echocardiogram: EF 61 to 65%, RV strain appears resolved  3. Continue heparin gtt, pharmacy to dose.   4. I have consulted  Dr. Zhao in cardiothoracic and vascular surgery for evaluation for thrombectomy versus lysis for the patient's left iliofemoral DVT.  He has evaluated the patient and agrees that she is at elevated risk for post thrombotic complications.  She will be taken to the OR today for iliofemoral and caval DVT lysis with staged mechanical thrombectomy.      I discussed the patient's findings and my recommendations with patient.    Scribed for Terrence Green MD by KERVIN Oh. 9/9/2022  12:44 EDT       I, Terrence Green M.D.,  have reviewed the notes, assessments, and/or procedures performed by KERVIN/PA, I CONCUR with the documentation of Urszula Gonzalez.

## 2022-09-09 NOTE — ANESTHESIA PROCEDURE NOTES
Airway  Urgency: elective    Date/Time: 9/9/2022 4:50 PM  Airway not difficult    General Information and Staff    Patient location during procedure: OR  Anesthesiologist: Ceci Will MD  CRNA/CAA: Joe Purvis CRNA  SRNA: Rhina Walker SRNA  Indications and Patient Condition  Indications for airway management: airway protection    Preoxygenated: yes  MILS not maintained throughout  Mask difficulty assessment: 1 - vent by mask    Final Airway Details  Final airway type: endotracheal airway      Successful airway: ETT  Cuffed: yes   Successful intubation technique: direct laryngoscopy  Endotracheal tube insertion site: oral  Blade: [place]  Blade size: 3  ETT size (mm): 7.0  Cormack-Lehane Classification: grade I - full view of glottis  Placement verified by: chest auscultation and capnometry   Cuff volume (mL): 8  Measured from: lips  ETT/EBT  to lips (cm): 21  Number of attempts at approach: 1  Assessment: lips, teeth, and gum same as pre-op and atraumatic intubation    Additional Comments  Negative epigastric sounds, Breath sound equal bilaterally with symmetric chest rise and fall

## 2022-09-09 NOTE — BRIEF OP NOTE
Urszula Gonzalez  9/9/2022    Pre-op Diagnosis:   Bilateral pulmonary emboli  COVID-19 infection  Left iliofemoral DVT  IVC DVT  Left lower extremity engorgement, pain       Post-Op Diagnosis Codes:  Same    Procedure/CPT® Codes:        Procedure(s):  1.  Supervision and interpretation of radiography fluoroscopy   2.  Ultrasound-guided needle access of the left popliteal vein   3. IVC venogram  4.  Third order cannulation of inferior vena cava  5. Initiation of TPA at left iliofemoral venous system and IVC    Surgeon(s):  Cooper Zhao MD Chaney, John H, MD Earle, Gary F, MD    Anesthesia: Choice    Staff:   Circulator: Haase, Sherri L, RN; Alis Burton RN  Scrub Person: Roland Palacios  Nursing Assistant: Ricky Edmond PCT; McDowell- Russell, Cassandra L         Estimated Blood Loss: minimal    Urine Voided: * No values recorded between 9/9/2022  4:41 PM and 9/9/2022  6:28 PM *    Specimens:                None          Drains:   Urethral Catheter Silicone 16 Fr. (Active)       Findings: 50 cm infusion catheter placed in infrarenal IVC extending into left iliofemoral system none        Complications: None          Cooper Zhao MD     Date: 9/9/2022  Time: 18:37 EDT

## 2022-09-09 NOTE — ANESTHESIA PROCEDURE NOTES
Peripheral IV    Patient location during procedure: OR  Start time: 9/9/2022 4:50 PM  Line placed for Fluids/Medication Admin, Sedation, Other and Difficult Access.  Performed By   Anesthesiologist: Ceci Will MD  Peripheral IV Prep   Patient position: supine   Prep: alcohol swabs  Patient monitoring: heart rate, cardiac monitor and continuous pulse ox  Peripheral IV Procedure   Laterality:right  Location:  Hand  Catheter size: 20 G         Post Assessment   Dressing Type: gauze, tape and transparent.    IV Dressing/Site: clean, dry and intact

## 2022-09-09 NOTE — CONSULTS
Cardiothoracic and Vascular Surgery Consultation Note    Date of consultation September 9, 2022    Referring physician Dr. Terrence Valenzuela cardiology    Chief complaint bilateral PE and iliocaval DVT    History of present illness  This is a 32-year-old woman with a history of POTS during second pregnancy, orthostatic tachycardia and hypotension, hypertension, and obesity who was recently diagnosed with COVID approximately 2.5 weeks ago and is now presenting with acute bilateral PE and DVT in the left common femoral vein, left external iliac vein, left common iliac vein, infrarenal IVC.  The patient presented to [facility] September 3, 2022 for worsening left lower extremity pain.  She has been treated with a heparin drip and was prescribed Eliquis for her recent diagnoses of PE and DVT, however her left leg edema and pain worsened prompting return to ER.  She was started on Coumadin for Eliquis failure.  She reports a history of grandmother with possible clotting issues however denies any other members of her family having any issue and also denies a personal history of clotting.    Review of systems  General no acute distress  Psychological anxious  Neurological pain left leg  Infectious denies fevers, chills, and other infectious symptoms  Ears nose throat no discharge  Cardiac no chest pain  Pulmonary does feel some shortness of breath with exercise  Gastrointestinal no abdominal pain   Genitourinary no pain  Musculoskeletal as above left leg pain  Lymphatic as above left leg edema  Dermatological no changes noted lesions    Past Medical History:   Diagnosis Date   • Anemia    • Anxiety    • Bradycardia, unspecified    • Depression    • DVT (deep venous thrombosis) (HCC)     Left Lower Extremity.   • Dysfunctional gallbladder    • GERD (gastroesophageal reflux disease)    • Gestational hypertension    • H. pylori infection    • Hypertension    • Hyponatremia    • Ovarian cyst    • Pulmonary emboli (HCC)  "   • Tachycardia      Past Surgical History:   Procedure Laterality Date   •  SECTION  2018   •  SECTION PRIMARY  2016   •  SECTION WITH TUBAL Bilateral 2021    Procedure:  SECTION REPEAT WITH TUBAL;  Surgeon: Vinayak Gutierrez MD;  Location:  [facility];  Service: Obstetrics/Gynecology;  Laterality: Bilateral;   • CHOLECYSTECTOMY N/A 3/13/2020    Procedure: CHOLECYSTECTOMY LAPAROSCOPIC;  Surgeon: Toby Murphy MD;  Location:  [facility];  Service: General;  Laterality: N/A;   • COLONOSCOPY     • ENDOSCOPY     • GASTRIC SLEEVE LAPAROSCOPIC     • TONSILLECTOMY     • WISDOM TOOTH EXTRACTION       Family History   Problem Relation Age of Onset   • Thyroid disease Mother    • Graves' disease Mother    • Myasthenia gravis Mother    • No Known Problems Sister    • Hypertension Father    • Hypertension Brother    • Diabetes Maternal Grandmother    • Cirrhosis Maternal Grandmother    • Cancer Maternal Grandmother    • No Known Problems Paternal Grandmother    • Cancer Paternal Grandfather    • Hypertension Brother    • No Known Problems Son    • No Known Problems Daughter      Social History     Tobacco Use   • Smoking status: Never Smoker   • Smokeless tobacco: Never Used   Vaping Use   • Vaping Use: Never used   Substance Use Topics   • Alcohol use: Not Currently     Comment: social   • Drug use: Never     Allergies   Allergen Reactions   • Ciprofloxacin Hives     Objective  BP (!) 150/105 (BP Location: Left arm, Patient Position: Lying)   Pulse 90   Temp 97.2 °F (36.2 °C) (Temporal)   Resp 18   Ht 167.6 cm (65.98\")   Wt 122 kg (268 lb 15.4 oz)   SpO2 96%   BMI 43.43 kg/m²     Exam  General no acute distress  Psychological anxious logical  Neurological grossly intact, slightly diminished sensation over left toes  Head atraumatic normocephalic  Eyes clear sclera  Ears nose throat no discharge  Cardiac regular rate and rhythm no murmurs rubs gallops "   Pulmonary clear bilaterally normal work of breathing on room air  Gastrointestinal nontender nondistended  Genitourinary no Stanley  Musculoskeletal bilateral lower extremities nontender to palpation  Lymphatic left leg edema  Dermatological no lesions    WBC   Date Value Ref Range Status   09/09/2022 11.40 (H) 3.40 - 10.80 10*3/mm3 Final     RBC   Date Value Ref Range Status   09/09/2022 3.99 3.77 - 5.28 10*6/mm3 Final     Hemoglobin   Date Value Ref Range Status   09/09/2022 9.9 (L) 12.0 - 15.9 g/dL Final     Hematocrit   Date Value Ref Range Status   09/09/2022 32.0 (L) 34.0 - 46.6 % Final     MCV   Date Value Ref Range Status   09/09/2022 80.2 79.0 - 97.0 fL Final     MCH   Date Value Ref Range Status   09/09/2022 24.8 (L) 26.6 - 33.0 pg Final     MCHC   Date Value Ref Range Status   09/09/2022 30.9 (L) 31.5 - 35.7 g/dL Final     RDW   Date Value Ref Range Status   09/09/2022 16.7 (H) 12.3 - 15.4 % Final     RDW-SD   Date Value Ref Range Status   09/09/2022 48.5 37.0 - 54.0 fl Final     MPV   Date Value Ref Range Status   09/09/2022 9.6 6.0 - 12.0 fL Final     Platelets   Date Value Ref Range Status   09/09/2022 292 140 - 450 10*3/mm3 Final     Neutrophil %   Date Value Ref Range Status   09/09/2022 52.2 42.7 - 76.0 % Final     Lymphocyte %   Date Value Ref Range Status   09/09/2022 32.9 19.6 - 45.3 % Final     Monocyte %   Date Value Ref Range Status   09/09/2022 11.3 5.0 - 12.0 % Final     Eosinophil %   Date Value Ref Range Status   09/09/2022 1.6 0.3 - 6.2 % Final     Basophil %   Date Value Ref Range Status   09/09/2022 0.5 0.0 - 1.5 % Final     Immature Grans %   Date Value Ref Range Status   09/09/2022 1.5 (H) 0.0 - 0.5 % Final     Neutrophils, Absolute   Date Value Ref Range Status   09/09/2022 5.95 1.70 - 7.00 10*3/mm3 Final     Lymphocytes, Absolute   Date Value Ref Range Status   09/09/2022 3.75 (H) 0.70 - 3.10 10*3/mm3 Final     Monocytes, Absolute   Date Value Ref Range Status   09/09/2022 1.29 (H)  0.10 - 0.90 10*3/mm3 Final     Eosinophils, Absolute   Date Value Ref Range Status   09/09/2022 0.18 0.00 - 0.40 10*3/mm3 Final     Basophils, Absolute   Date Value Ref Range Status   09/09/2022 0.06 0.00 - 0.20 10*3/mm3 Final     Immature Grans, Absolute   Date Value Ref Range Status   09/09/2022 0.17 (H) 0.00 - 0.05 10*3/mm3 Final     nRBC   Date Value Ref Range Status   09/09/2022 0.4 (H) 0.0 - 0.2 /100 WBC Final     Basic Metabolic Panel    Sodium Sodium   Date Value Ref Range Status   09/09/2022 140 136 - 145 mmol/L Final      Potassium Potassium   Date Value Ref Range Status   09/09/2022 4.0 3.5 - 5.2 mmol/L Final     Comment:     Slight hemolysis detected by analyzer. Results may be affected.      Chloride Chloride   Date Value Ref Range Status   09/09/2022 104 98 - 107 mmol/L Final      Bicarbonate No results found for: PLASMABICARB   BUN BUN   Date Value Ref Range Status   09/09/2022 8 6 - 20 mg/dL Final      Creatinine Creatinine   Date Value Ref Range Status   09/09/2022 0.77 0.57 - 1.00 mg/dL Final      Calcium Calcium   Date Value Ref Range Status   09/09/2022 8.9 8.6 - 10.5 mg/dL Final      Glucose      No components found for: GLUCOSE.*     Imaging  CT abdomen pelvis reviewed notable for large amount of thrombotic material in the IVC, iliofemoral system on the left side.   Echocardiogram notable for normal biventricular ejection fraction and absence of right heart strain..    Assessment  32-year-old woman with history of obesity and possible clotting disorder in the family who had COVID infection approximately 2.5 weeks ago presenting with large burden IVC and left iliofemoral DVT that is symptomatic and concerning for high risk for evolution to posttraumatic syndrome and chronic irreversible pain and/or disability.  She is hemodynamically stable and is being managed medically for her bilateral PE,     Plan  Iliofemoral and caval DVT lysis and likely staged mechanical thrombectomy  N.p.o.  Type and  milton    Discussed with Dr. Rafiq Martinez Interventional Cardiology    Cooper Zhao M.D.   Cardiothoracic and Vascular Surgeon  [facility]

## 2022-09-09 NOTE — ANESTHESIA PROCEDURE NOTES
Peripheral IV    Patient location during procedure: OR  Start time: 9/9/2022 4:50 PM  Line placed for Fluids/Medication Admin, Sedation and Difficult Access.  Performed By   CRNA/CAA: Joe Purvis CRNA  Preanesthetic Checklist  Completed: patient identified, IV checked, site marked, risks and benefits discussed, surgical consent, monitors and equipment checked, pre-op evaluation and timeout performed  Peripheral IV Prep   Patient position: supine   Prep: alcohol swabs  Patient monitoring: heart rate, cardiac monitor and continuous pulse ox  Peripheral IV Procedure   Laterality:left  Location:  Wrist  Catheter size: 18 G         Post Assessment   Dressing Type: gauze, tape and transparent.    IV Dressing/Site: clean, dry and intact

## 2022-09-09 NOTE — PROGRESS NOTES
"Pulmonary/Critical Care Progress Note     LOS: 0 days   Patient Care Team:  Cha Fernández APRN as PCP - General (Family Medicine)    Chief Complaint: Symptomatic DVT    Subjective     HPI: Urszula Gonzalez is a 32 y.o. female who was admitted on 9/9 by Cardiology.      She tells me her symptoms originated as L foot pain on 8/18 which she was told was \"heel spurs\".  This pain continued to worsen over the next week.  She developed SOA on 8/21 which prompted her to take a home COVID-19 test which was positive.  She continued to have mild CHAVEZ and worsenign LLE pain w/ some swelling of her ankle.  On 9/3 she had 2 syncopal episodes after her  massaged her leg which prompted her to present to [facility].  She was initially treated w/ heparin gtt and transitioned to Eliquis prior to D/C the next day.    Shortly after arriving home she had excruciating pain in her LLE and returned to the ED where repeat imaging showed \"acute DVT within the left common femoral vein, left external iliac vein, left common iliac vein extending into the infrarenal IVC and almost to the level of renal veins\".  She was admitted and started on coumadin.  The intention was to transfer to [facility] but they were unable to secure a bed.   She was transferred to our facility for possible intervention on 9/9.  ECHO obtained on day of admission shows normal EF, normal RV size, wall thickness, septal motion, and systolic function.    Dr. Zhao was consulted on day of admission and the patient was taken to the OR for IVC cannulation and intravenous TPA administration to the IVC/left iliofemoral venous system.  Plan is to return to the OR @ 0800 on 9/10 for mechanical thrombectomy.      Patient has several risk factors for hypercoagulable state: MO, recent COVID-19 infection (asymptomatic, tested + on home test 8/22), and possible family hx of clotting disorder (grandmother had \"red marks\" on her legs).  She does not smoke and is " not on oral birth control.  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   She denies any long trips or prolonged immobility or prior trauma to her legs.      I performed an independent history and physical examination. Portions of the history were obtained by KERVIN Nj who spent 7 minutes on patient care and were modified by me according to my findings. The above note reflects my findings, assessment, and plan.    Johnny Panchal MD    History taken from: patient    Past Medical History:   Diagnosis Date   • Anemia    • Anxiety    • Bradycardia, unspecified    • Depression    • DVT (deep venous thrombosis) (HCC)     Left Lower Extremity.   • Dysfunctional gallbladder    • GERD (gastroesophageal reflux disease)    • Gestational hypertension    • H. pylori infection    • Hypertension    • Hyponatremia    • Ovarian cyst    • Pulmonary emboli (HCC)    • Tachycardia        Past Surgical History:   Procedure Laterality Date   •  SECTION  2018   •  SECTION PRIMARY  2016   •  SECTION WITH TUBAL Bilateral 2021    Procedure:  SECTION REPEAT WITH TUBAL;  Surgeon: Vinayak Gutierrez MD;  Location:  [facility];  Service: Obstetrics/Gynecology;  Laterality: Bilateral;   • CHOLECYSTECTOMY N/A 3/13/2020    Procedure: CHOLECYSTECTOMY LAPAROSCOPIC;  Surgeon: Toby Murphy MD;  Location: [facility] OR;  Service: General;  Laterality: N/A;   • COLONOSCOPY     • ENDOSCOPY     • GASTRIC SLEEVE LAPAROSCOPIC     • TONSILLECTOMY     • WISDOM TOOTH EXTRACTION         Family History   Problem Relation Age of Onset   • Thyroid disease Mother    • Graves' disease Mother    • Myasthenia gravis Mother    • No Known Problems Sister    • Hypertension Father    • Hypertension Brother    • Diabetes Maternal Grandmother    • Cirrhosis Maternal Grandmother    • Cancer Maternal Grandmother    • No Known Problems Paternal Grandmother    • Cancer Paternal  Grandfather    • Hypertension Brother    • No Known Problems Son    • No Known Problems Daughter        Social History     Socioeconomic History   • Marital status:      Spouse name: nate gary   • Number of children: 2   • Highest education level: High school graduate   Tobacco Use   • Smoking status: Never Smoker   • Smokeless tobacco: Never Used   Vaping Use   • Vaping Use: Never used   Substance and Sexual Activity   • Alcohol use: Not Currently     Comment: social   • Drug use: Never   • Sexual activity: Defer     Partners: Male     Birth control/protection: None, Surgical       Allergies   Allergen Reactions   • Ciprofloxacin Hives       PMH/FH/SocH were reviewed by me and updates were made.     Review of Systems: see H&P and HPI   All systems were reviewed and negative except as noted in subjective.      Objective     Vital Signs  Temp:  [97 °F (36.1 °C)-97.2 °F (36.2 °C)] 97 °F (36.1 °C)  Heart Rate:  [78-90] 82  Resp:  [18-20] 20  BP: (128-150)/() 137/82    Physical Exam:     General Appearance:    Alert, cooperative, in no acute distress   Head:    Normocephalic, without obvious abnormality, atraumatic   Eyes:            Lids and lashes normal, conjunctivae and sclerae normal,   no icterus, no pallor, corneas clear, PERRLA   ENMT:   Ears appear intact with no abnormalities noted      No oral lesions, no thrush, oral mucosa moist   Neck:   No adenopathy, supple, trachea midline, no thyromegaly, no carotid bruit, no JVD   Lungs/resp:     Normal effort, symmetric chest rise, no crepitus, clear to      auscultation bilaterally, no chest wall tenderness, resonant to percussion throughout.                  Heart/CV:    Regular rhythm and normal rate, normal S1 and S2, no         murmur, no gallop, no rub, no click   Abdomen/GI:     Normal bowel sounds, no masses, no organomegaly, soft     nontender, nondistended, no guarding, no rebound                tenderness   G/U:     Deferred    Extremities/MSK:   No clubbing, cyanosis or edema.  No deformities.    Pulses:   Pulses palpable and equal bilaterally   Skin:   No bleeding, bruising or rash   Hem/Lymph:   No cervical or supraclavicular adenopathy.    Neurologic:   Moves all extremities with no obvious focal motor deficit.  Cranial nerves 2 - 12 grossly intact            Psychiatric:  Normal mood and affect, oriented x 3.      Results Review:     I reviewed the patient's new clinical results.   Results from last 7 days   Lab Units 09/09/22  1914 09/09/22  1228   SODIUM mmol/L 138 140   POTASSIUM mmol/L 4.5 4.0   CHLORIDE mmol/L 104 104   CO2 mmol/L 21.0* 23.0   BUN mg/dL 7 8   CREATININE mg/dL 0.79 0.77   CALCIUM mg/dL 8.7 8.9   GLUCOSE mg/dL 114* 92     Results from last 7 days   Lab Units 09/09/22  1914 09/09/22  1228   WBC 10*3/mm3 12.08* 11.40*   HEMOGLOBIN g/dL 9.8* 9.9*   HEMATOCRIT % 31.7* 32.0*   PLATELETS 10*3/mm3 293 292           I reviewed the patient's new imaging including images and reports.    Medication Review:   alteplase (CATHFLO) INTRACATHETER injection, 8 mg, Intravenous, Once  [START ON 9/10/2022] pantoprazole, 40 mg, Intravenous, Q AM  sodium chloride, 10 mL, Intravenous, Q12H      alteplase (ACTIVASE) 20 mg in 0.9% NaCl 500 mL, 1 mg/hr, Last Rate: 1 mg/hr (09/09/22 2008)  heparin, 500 Units/hr, Last Rate: 4.098 Units/kg/hr (09/09/22 1924)  HYDROmorphone HCl-NaCl,   lactated ringers, 125 mL/hr, Last Rate: 125 mL/hr (09/09/22 2056)  sodium chloride, 35 mL/hr        Assessment & Plan       Acute pulmonary embolism without acute cor pulmonale (HCC)    Gastroesophageal reflux disease without esophagitis    Deep vein thrombosis (DVT) associated with COVID-19    Examination for normal comparison for clinical research    Depression    32 y.o. recently diagnosed with bilateral pulmonary embolism and treated at [facility].  Subsequently developed worsening left lower extremity pain and very presented where imaging showed extensive  left DVT extending up almost to the level of the renal veins.  She was transferred here for further surgical evaluation.    Patient was taken to the operating room and underwent IVC cannulation and IV tPA administration by Dr. Zhao on 9/9/2022.  Plan is to take the patient back to the operating room for thrombectomy on 9/10/2022.    Patient reports pain is currently adequately controlled.    Plan:  1.  For DVT/pulmonary embolism: Getting tPA.  Plan to go back to operating room tomorrow per Dr. Zhao.  Patient will need anticoagulation for at least a year in my opinion.  Patient to follow-up with labs drawn at [facility] on presentation.  2.  For GERD on home PPI: Continue Protonix.  3.  For depression: Continue Prozac.    Electronically signed by:    Johnny Panchal MD  09/09/22  23:40 EDT

## 2022-09-09 NOTE — ANESTHESIA POSTPROCEDURE EVALUATION
Patient: Urszula Gonzalez    Procedure Summary     Date: 09/09/22 Room / Location:  [facility] /  [facility]    Anesthesia Start: 1640 Anesthesia Stop: 1834    Procedure: venogram, initiation of TPA at left leg and IVC (Left Leg Lower) Diagnosis:     Surgeons: Cooper Zhao MD Provider: Ceci Will MD    Anesthesia Type: general ASA Status: 3 - Emergent          Anesthesia Type: general    Vitals  No vitals data found for the desired time range.          Post Anesthesia Care and Evaluation    Patient location during evaluation: ICU  Patient participation: complete - patient participated  Level of consciousness: awake  Pain score: 0  Pain management: adequate    Airway patency: patent  Anesthetic complications: No anesthetic complications  PONV Status: none  Cardiovascular status: acceptable and hemodynamically stable  Respiratory status: acceptable, spontaneous ventilation, nonlabored ventilation and nasal cannula  Hydration status: acceptable    Comments: No apparent anesthesia complications noted

## 2022-09-10 ENCOUNTER — ANESTHESIA (OUTPATIENT)
Dept: PERIOP | Facility: HOSPITAL | Age: 32
End: 2022-09-10

## 2022-09-10 ENCOUNTER — ANESTHESIA EVENT (OUTPATIENT)
Dept: PERIOP | Facility: HOSPITAL | Age: 32
End: 2022-09-10

## 2022-09-10 ENCOUNTER — APPOINTMENT (OUTPATIENT)
Dept: GENERAL RADIOLOGY | Facility: HOSPITAL | Age: 32
End: 2022-09-10

## 2022-09-10 LAB
ANION GAP SERPL CALCULATED.3IONS-SCNC: 11 MMOL/L (ref 5–15)
APTT PPP: 109.1 SECONDS (ref 60–90)
APTT PPP: 49.3 SECONDS (ref 60–90)
APTT PPP: 75.4 SECONDS (ref 60–90)
BUN SERPL-MCNC: 8 MG/DL (ref 6–20)
BUN/CREAT SERPL: 10.4 (ref 7–25)
CALCIUM SPEC-SCNC: 8.8 MG/DL (ref 8.6–10.5)
CHLORIDE SERPL-SCNC: 108 MMOL/L (ref 98–107)
CO2 SERPL-SCNC: 23 MMOL/L (ref 22–29)
CREAT SERPL-MCNC: 0.77 MG/DL (ref 0.57–1)
DEPRECATED RDW RBC AUTO: 46 FL (ref 37–54)
DEPRECATED RDW RBC AUTO: 48.1 FL (ref 37–54)
DEPRECATED RDW RBC AUTO: 48.4 FL (ref 37–54)
EGFRCR SERPLBLD CKD-EPI 2021: 105.9 ML/MIN/1.73
ERYTHROCYTE [DISTWIDTH] IN BLOOD BY AUTOMATED COUNT: 16 % (ref 12.3–15.4)
ERYTHROCYTE [DISTWIDTH] IN BLOOD BY AUTOMATED COUNT: 16.3 % (ref 12.3–15.4)
ERYTHROCYTE [DISTWIDTH] IN BLOOD BY AUTOMATED COUNT: 16.4 % (ref 12.3–15.4)
FIBRINOGEN PPP-MCNC: 103 MG/DL (ref 220–470)
FIBRINOGEN PPP-MCNC: 264 MG/DL (ref 220–470)
GLUCOSE SERPL-MCNC: 145 MG/DL (ref 65–99)
HCT VFR BLD AUTO: 32 % (ref 34–46.6)
HCT VFR BLD AUTO: 32.1 % (ref 34–46.6)
HCT VFR BLD AUTO: 32.9 % (ref 34–46.6)
HGB BLD-MCNC: 10 G/DL (ref 12–15.9)
HGB BLD-MCNC: 10 G/DL (ref 12–15.9)
HGB BLD-MCNC: 9.6 G/DL (ref 12–15.9)
MAGNESIUM SERPL-MCNC: 2 MG/DL (ref 1.6–2.6)
MCH RBC QN AUTO: 24.4 PG (ref 26.6–33)
MCH RBC QN AUTO: 24.7 PG (ref 26.6–33)
MCH RBC QN AUTO: 25.1 PG (ref 26.6–33)
MCHC RBC AUTO-ENTMCNC: 30 G/DL (ref 31.5–35.7)
MCHC RBC AUTO-ENTMCNC: 30.4 G/DL (ref 31.5–35.7)
MCHC RBC AUTO-ENTMCNC: 31.2 G/DL (ref 31.5–35.7)
MCV RBC AUTO: 80.5 FL (ref 79–97)
MCV RBC AUTO: 81.2 FL (ref 79–97)
MCV RBC AUTO: 81.4 FL (ref 79–97)
PHOSPHATE SERPL-MCNC: 3.8 MG/DL (ref 2.5–4.5)
PLATELET # BLD AUTO: 237 10*3/MM3 (ref 140–450)
PLATELET # BLD AUTO: 268 10*3/MM3 (ref 140–450)
PLATELET # BLD AUTO: 278 10*3/MM3 (ref 140–450)
PMV BLD AUTO: 9.4 FL (ref 6–12)
PMV BLD AUTO: 9.7 FL (ref 6–12)
PMV BLD AUTO: 9.8 FL (ref 6–12)
POTASSIUM SERPL-SCNC: 4.3 MMOL/L (ref 3.5–5.2)
RBC # BLD AUTO: 3.93 10*6/MM3 (ref 3.77–5.28)
RBC # BLD AUTO: 3.99 10*6/MM3 (ref 3.77–5.28)
RBC # BLD AUTO: 4.05 10*6/MM3 (ref 3.77–5.28)
SODIUM SERPL-SCNC: 142 MMOL/L (ref 136–145)
UFH PPP CHRO-ACNC: 0.27 IU/ML (ref 0.3–0.7)
WBC NRBC COR # BLD: 11.41 10*3/MM3 (ref 3.4–10.8)
WBC NRBC COR # BLD: 14.91 10*3/MM3 (ref 3.4–10.8)
WBC NRBC COR # BLD: 16.03 10*3/MM3 (ref 3.4–10.8)

## 2022-09-10 PROCEDURE — C1757 CATH, THROMBECTOMY/EMBOLECT: HCPCS | Performed by: STUDENT IN AN ORGANIZED HEALTH CARE EDUCATION/TRAINING PROGRAM

## 2022-09-10 PROCEDURE — 06CD3ZZ EXTIRPATION OF MATTER FROM LEFT COMMON ILIAC VEIN, PERCUTANEOUS APPROACH: ICD-10-PCS | Performed by: STUDENT IN AN ORGANIZED HEALTH CARE EDUCATION/TRAINING PROGRAM

## 2022-09-10 PROCEDURE — 84100 ASSAY OF PHOSPHORUS: CPT

## 2022-09-10 PROCEDURE — 25010000002 CEFAZOLIN IN DEXTROSE 2-4 GM/100ML-% SOLUTION: Performed by: NURSE ANESTHETIST, CERTIFIED REGISTERED

## 2022-09-10 PROCEDURE — 36010 PLACE CATHETER IN VEIN: CPT | Performed by: STUDENT IN AN ORGANIZED HEALTH CARE EDUCATION/TRAINING PROGRAM

## 2022-09-10 PROCEDURE — 80048 BASIC METABOLIC PNL TOTAL CA: CPT

## 2022-09-10 PROCEDURE — 85520 HEPARIN ASSAY: CPT | Performed by: PHYSICIAN ASSISTANT

## 2022-09-10 PROCEDURE — C1769 GUIDE WIRE: HCPCS | Performed by: STUDENT IN AN ORGANIZED HEALTH CARE EDUCATION/TRAINING PROGRAM

## 2022-09-10 PROCEDURE — 86900 BLOOD TYPING SEROLOGIC ABO: CPT

## 2022-09-10 PROCEDURE — 85384 FIBRINOGEN ACTIVITY: CPT | Performed by: PHYSICIAN ASSISTANT

## 2022-09-10 PROCEDURE — 93010 ELECTROCARDIOGRAM REPORT: CPT | Performed by: INTERNAL MEDICINE

## 2022-09-10 PROCEDURE — 88304 TISSUE EXAM BY PATHOLOGIST: CPT | Performed by: STUDENT IN AN ORGANIZED HEALTH CARE EDUCATION/TRAINING PROGRAM

## 2022-09-10 PROCEDURE — 75825 VEIN X-RAY TRUNK: CPT | Performed by: STUDENT IN AN ORGANIZED HEALTH CARE EDUCATION/TRAINING PROGRAM

## 2022-09-10 PROCEDURE — C1725 CATH, TRANSLUMIN NON-LASER: HCPCS | Performed by: STUDENT IN AN ORGANIZED HEALTH CARE EDUCATION/TRAINING PROGRAM

## 2022-09-10 PROCEDURE — 06C03ZZ EXTIRPATION OF MATTER FROM INFERIOR VENA CAVA, PERCUTANEOUS APPROACH: ICD-10-PCS | Performed by: STUDENT IN AN ORGANIZED HEALTH CARE EDUCATION/TRAINING PROGRAM

## 2022-09-10 PROCEDURE — 25010000002 HEPARIN (PORCINE) 25000-0.45 UT/250ML-% SOLUTION: Performed by: STUDENT IN AN ORGANIZED HEALTH CARE EDUCATION/TRAINING PROGRAM

## 2022-09-10 PROCEDURE — 06CN3ZZ EXTIRPATION OF MATTER FROM LEFT FEMORAL VEIN, PERCUTANEOUS APPROACH: ICD-10-PCS | Performed by: STUDENT IN AN ORGANIZED HEALTH CARE EDUCATION/TRAINING PROGRAM

## 2022-09-10 PROCEDURE — P9040 RBC LEUKOREDUCED IRRADIATED: HCPCS

## 2022-09-10 PROCEDURE — 25010000002 HYDROMORPHONE PER 4 MG: Performed by: NURSE ANESTHETIST, CERTIFIED REGISTERED

## 2022-09-10 PROCEDURE — C1894 INTRO/SHEATH, NON-LASER: HCPCS | Performed by: STUDENT IN AN ORGANIZED HEALTH CARE EDUCATION/TRAINING PROGRAM

## 2022-09-10 PROCEDURE — 25010000002 HEPARIN (PORCINE) PER 1000 UNITS: Performed by: NURSE ANESTHETIST, CERTIFIED REGISTERED

## 2022-09-10 PROCEDURE — 37249 TRLUML BALO ANGIOP ADDL VEIN: CPT | Performed by: STUDENT IN AN ORGANIZED HEALTH CARE EDUCATION/TRAINING PROGRAM

## 2022-09-10 PROCEDURE — 83735 ASSAY OF MAGNESIUM: CPT

## 2022-09-10 PROCEDURE — 067D3ZZ DILATION OF LEFT COMMON ILIAC VEIN, PERCUTANEOUS APPROACH: ICD-10-PCS | Performed by: STUDENT IN AN ORGANIZED HEALTH CARE EDUCATION/TRAINING PROGRAM

## 2022-09-10 PROCEDURE — 37188 VEN MECHNL THRMBC REPEAT TX: CPT | Performed by: STUDENT IN AN ORGANIZED HEALTH CARE EDUCATION/TRAINING PROGRAM

## 2022-09-10 PROCEDURE — 37248 TRLUML BALO ANGIOP 1ST VEIN: CPT | Performed by: STUDENT IN AN ORGANIZED HEALTH CARE EDUCATION/TRAINING PROGRAM

## 2022-09-10 PROCEDURE — 93005 ELECTROCARDIOGRAM TRACING: CPT | Performed by: ANESTHESIOLOGY

## 2022-09-10 PROCEDURE — 85347 COAGULATION TIME ACTIVATED: CPT

## 2022-09-10 PROCEDURE — 067N3ZZ DILATION OF LEFT FEMORAL VEIN, PERCUTANEOUS APPROACH: ICD-10-PCS | Performed by: STUDENT IN AN ORGANIZED HEALTH CARE EDUCATION/TRAINING PROGRAM

## 2022-09-10 PROCEDURE — 36430 TRANSFUSION BLD/BLD COMPNT: CPT

## 2022-09-10 PROCEDURE — 99232 SBSQ HOSP IP/OBS MODERATE 35: CPT | Performed by: INTERNAL MEDICINE

## 2022-09-10 PROCEDURE — 25010000002 PROTAMINE SULFATE PER 10 MG: Performed by: NURSE ANESTHETIST, CERTIFIED REGISTERED

## 2022-09-10 PROCEDURE — 85730 THROMBOPLASTIN TIME PARTIAL: CPT | Performed by: STUDENT IN AN ORGANIZED HEALTH CARE EDUCATION/TRAINING PROGRAM

## 2022-09-10 PROCEDURE — 25010000002 HEPARIN (PORCINE) PER 1000 UNITS: Performed by: STUDENT IN AN ORGANIZED HEALTH CARE EDUCATION/TRAINING PROGRAM

## 2022-09-10 PROCEDURE — 067Y3ZZ DILATION OF LOWER VEIN, PERCUTANEOUS APPROACH: ICD-10-PCS | Performed by: STUDENT IN AN ORGANIZED HEALTH CARE EDUCATION/TRAINING PROGRAM

## 2022-09-10 PROCEDURE — 067G3ZZ DILATION OF LEFT EXTERNAL ILIAC VEIN, PERCUTANEOUS APPROACH: ICD-10-PCS | Performed by: STUDENT IN AN ORGANIZED HEALTH CARE EDUCATION/TRAINING PROGRAM

## 2022-09-10 PROCEDURE — 85027 COMPLETE CBC AUTOMATED: CPT | Performed by: PHYSICIAN ASSISTANT

## 2022-09-10 PROCEDURE — 25010000002 IOPAMIDOL 61 % SOLUTION: Performed by: STUDENT IN AN ORGANIZED HEALTH CARE EDUCATION/TRAINING PROGRAM

## 2022-09-10 PROCEDURE — 85027 COMPLETE CBC AUTOMATED: CPT | Performed by: STUDENT IN AN ORGANIZED HEALTH CARE EDUCATION/TRAINING PROGRAM

## 2022-09-10 PROCEDURE — 75820 VEIN X-RAY ARM/LEG: CPT | Performed by: STUDENT IN AN ORGANIZED HEALTH CARE EDUCATION/TRAINING PROGRAM

## 2022-09-10 PROCEDURE — 85730 THROMBOPLASTIN TIME PARTIAL: CPT | Performed by: PHYSICIAN ASSISTANT

## 2022-09-10 PROCEDURE — 25010000002 ONDANSETRON PER 1 MG: Performed by: NURSE ANESTHETIST, CERTIFIED REGISTERED

## 2022-09-10 PROCEDURE — 0 MEPERIDINE PER 100 MG: Performed by: NURSE ANESTHETIST, CERTIFIED REGISTERED

## 2022-09-10 RX ORDER — MEPERIDINE HYDROCHLORIDE 25 MG/ML
25 INJECTION INTRAMUSCULAR; INTRAVENOUS; SUBCUTANEOUS ONCE
Status: COMPLETED | OUTPATIENT
Start: 2022-09-10 | End: 2022-09-10

## 2022-09-10 RX ORDER — HEPARIN SODIUM 10000 [USP'U]/100ML
12.5 INJECTION, SOLUTION INTRAVENOUS
Status: DISCONTINUED | OUTPATIENT
Start: 2022-09-10 | End: 2022-09-13

## 2022-09-10 RX ORDER — CALCIUM CARBONATE 200(500)MG
2 TABLET,CHEWABLE ORAL 3 TIMES DAILY PRN
Status: DISCONTINUED | OUTPATIENT
Start: 2022-09-10 | End: 2022-09-14 | Stop reason: HOSPADM

## 2022-09-10 RX ORDER — HEPARIN SODIUM 1000 [USP'U]/ML
INJECTION, SOLUTION INTRAVENOUS; SUBCUTANEOUS AS NEEDED
Status: DISCONTINUED | OUTPATIENT
Start: 2022-09-10 | End: 2022-09-10 | Stop reason: SURG

## 2022-09-10 RX ORDER — SODIUM CHLORIDE 0.9 % (FLUSH) 0.9 %
10 SYRINGE (ML) INJECTION AS NEEDED
Status: DISCONTINUED | OUTPATIENT
Start: 2022-09-10 | End: 2022-09-10 | Stop reason: HOSPADM

## 2022-09-10 RX ORDER — FAMOTIDINE 20 MG/1
20 TABLET, FILM COATED ORAL ONCE
Status: COMPLETED | OUTPATIENT
Start: 2022-09-10 | End: 2022-09-10

## 2022-09-10 RX ORDER — DROPERIDOL 2.5 MG/ML
0.62 INJECTION, SOLUTION INTRAMUSCULAR; INTRAVENOUS
Status: DISCONTINUED | OUTPATIENT
Start: 2022-09-10 | End: 2022-09-10

## 2022-09-10 RX ORDER — PROTAMINE SULFATE 10 MG/ML
INJECTION, SOLUTION INTRAVENOUS AS NEEDED
Status: DISCONTINUED | OUTPATIENT
Start: 2022-09-10 | End: 2022-09-10 | Stop reason: SURG

## 2022-09-10 RX ORDER — HEPARIN SODIUM 1000 [USP'U]/ML
2000 INJECTION, SOLUTION INTRAVENOUS; SUBCUTANEOUS AS NEEDED
Status: DISCONTINUED | OUTPATIENT
Start: 2022-09-10 | End: 2022-09-10

## 2022-09-10 RX ORDER — PROMETHAZINE HYDROCHLORIDE 25 MG/1
25 TABLET ORAL ONCE AS NEEDED
Status: DISCONTINUED | OUTPATIENT
Start: 2022-09-10 | End: 2022-09-10

## 2022-09-10 RX ORDER — ALPRAZOLAM 0.25 MG/1
0.25 TABLET ORAL ONCE
Status: COMPLETED | OUTPATIENT
Start: 2022-09-10 | End: 2022-09-10

## 2022-09-10 RX ORDER — MIDAZOLAM HYDROCHLORIDE 1 MG/ML
1 INJECTION INTRAMUSCULAR; INTRAVENOUS
Status: DISCONTINUED | OUTPATIENT
Start: 2022-09-10 | End: 2022-09-10 | Stop reason: HOSPADM

## 2022-09-10 RX ORDER — HEPARIN SODIUM 1000 [USP'U]/ML
4000 INJECTION, SOLUTION INTRAVENOUS; SUBCUTANEOUS AS NEEDED
Status: DISCONTINUED | OUTPATIENT
Start: 2022-09-10 | End: 2022-09-10

## 2022-09-10 RX ORDER — ONDANSETRON 2 MG/ML
4 INJECTION INTRAMUSCULAR; INTRAVENOUS ONCE AS NEEDED
Status: DISCONTINUED | OUTPATIENT
Start: 2022-09-10 | End: 2022-09-10

## 2022-09-10 RX ORDER — FAMOTIDINE 10 MG/ML
20 INJECTION, SOLUTION INTRAVENOUS ONCE
Status: DISCONTINUED | OUTPATIENT
Start: 2022-09-10 | End: 2022-09-10 | Stop reason: HOSPADM

## 2022-09-10 RX ORDER — SODIUM CHLORIDE 0.9 % (FLUSH) 0.9 %
3-10 SYRINGE (ML) INJECTION AS NEEDED
Status: DISCONTINUED | OUTPATIENT
Start: 2022-09-10 | End: 2022-09-10

## 2022-09-10 RX ORDER — CEFAZOLIN SODIUM 2 G/100ML
INJECTION, SOLUTION INTRAVENOUS AS NEEDED
Status: DISCONTINUED | OUTPATIENT
Start: 2022-09-10 | End: 2022-09-10 | Stop reason: SURG

## 2022-09-10 RX ORDER — PROMETHAZINE HYDROCHLORIDE 25 MG/1
25 SUPPOSITORY RECTAL ONCE AS NEEDED
Status: DISCONTINUED | OUTPATIENT
Start: 2022-09-10 | End: 2022-09-10

## 2022-09-10 RX ORDER — DROPERIDOL 2.5 MG/ML
0.62 INJECTION, SOLUTION INTRAMUSCULAR; INTRAVENOUS ONCE AS NEEDED
Status: DISCONTINUED | OUTPATIENT
Start: 2022-09-10 | End: 2022-09-10

## 2022-09-10 RX ORDER — MEPERIDINE HYDROCHLORIDE 25 MG/ML
12.5 INJECTION INTRAMUSCULAR; INTRAVENOUS; SUBCUTANEOUS
Status: DISCONTINUED | OUTPATIENT
Start: 2022-09-10 | End: 2022-09-10

## 2022-09-10 RX ORDER — HYDROMORPHONE HCL 110MG/55ML
PATIENT CONTROLLED ANALGESIA SYRINGE INTRAVENOUS AS NEEDED
Status: DISCONTINUED | OUTPATIENT
Start: 2022-09-10 | End: 2022-09-10 | Stop reason: SURG

## 2022-09-10 RX ORDER — LIDOCAINE HYDROCHLORIDE 10 MG/ML
0.5 INJECTION, SOLUTION EPIDURAL; INFILTRATION; INTRACAUDAL; PERINEURAL ONCE AS NEEDED
Status: DISCONTINUED | OUTPATIENT
Start: 2022-09-10 | End: 2022-09-10 | Stop reason: HOSPADM

## 2022-09-10 RX ORDER — IPRATROPIUM BROMIDE AND ALBUTEROL SULFATE 2.5; .5 MG/3ML; MG/3ML
3 SOLUTION RESPIRATORY (INHALATION) ONCE AS NEEDED
Status: DISCONTINUED | OUTPATIENT
Start: 2022-09-10 | End: 2022-09-10

## 2022-09-10 RX ORDER — SODIUM CHLORIDE 0.9 % (FLUSH) 0.9 %
10 SYRINGE (ML) INJECTION EVERY 12 HOURS SCHEDULED
Status: DISCONTINUED | OUTPATIENT
Start: 2022-09-10 | End: 2022-09-10 | Stop reason: HOSPADM

## 2022-09-10 RX ORDER — LORAZEPAM 0.5 MG/1
0.5 TABLET ORAL EVERY 6 HOURS PRN
Status: DISCONTINUED | OUTPATIENT
Start: 2022-09-10 | End: 2022-09-14 | Stop reason: HOSPADM

## 2022-09-10 RX ORDER — SODIUM CHLORIDE, SODIUM LACTATE, POTASSIUM CHLORIDE, CALCIUM CHLORIDE 600; 310; 30; 20 MG/100ML; MG/100ML; MG/100ML; MG/100ML
9 INJECTION, SOLUTION INTRAVENOUS CONTINUOUS
Status: DISCONTINUED | OUTPATIENT
Start: 2022-09-10 | End: 2022-09-10

## 2022-09-10 RX ORDER — NALOXONE HCL 0.4 MG/ML
0.4 VIAL (ML) INJECTION AS NEEDED
Status: DISCONTINUED | OUTPATIENT
Start: 2022-09-10 | End: 2022-09-10

## 2022-09-10 RX ORDER — FENTANYL CITRATE 50 UG/ML
50 INJECTION, SOLUTION INTRAMUSCULAR; INTRAVENOUS
Status: DISCONTINUED | OUTPATIENT
Start: 2022-09-10 | End: 2022-09-10

## 2022-09-10 RX ORDER — ONDANSETRON 2 MG/ML
INJECTION INTRAMUSCULAR; INTRAVENOUS AS NEEDED
Status: DISCONTINUED | OUTPATIENT
Start: 2022-09-10 | End: 2022-09-10 | Stop reason: SURG

## 2022-09-10 RX ORDER — HYDROCODONE BITARTRATE AND ACETAMINOPHEN 5; 325 MG/1; MG/1
1 TABLET ORAL EVERY 6 HOURS PRN
Status: DISCONTINUED | OUTPATIENT
Start: 2022-09-10 | End: 2022-09-11

## 2022-09-10 RX ORDER — HYDROMORPHONE HYDROCHLORIDE 1 MG/ML
0.5 INJECTION, SOLUTION INTRAMUSCULAR; INTRAVENOUS; SUBCUTANEOUS
Status: DISCONTINUED | OUTPATIENT
Start: 2022-09-10 | End: 2022-09-10

## 2022-09-10 RX ORDER — HEPARIN SODIUM 10000 [USP'U]/100ML
8.1 INJECTION, SOLUTION INTRAVENOUS
Status: DISCONTINUED | OUTPATIENT
Start: 2022-09-10 | End: 2022-09-10

## 2022-09-10 RX ORDER — SODIUM CHLORIDE 0.9 % (FLUSH) 0.9 %
3 SYRINGE (ML) INJECTION EVERY 12 HOURS SCHEDULED
Status: DISCONTINUED | OUTPATIENT
Start: 2022-09-10 | End: 2022-09-10

## 2022-09-10 RX ORDER — LABETALOL HYDROCHLORIDE 5 MG/ML
5 INJECTION, SOLUTION INTRAVENOUS
Status: DISCONTINUED | OUTPATIENT
Start: 2022-09-10 | End: 2022-09-10

## 2022-09-10 RX ORDER — ROCURONIUM BROMIDE 10 MG/ML
INJECTION, SOLUTION INTRAVENOUS AS NEEDED
Status: DISCONTINUED | OUTPATIENT
Start: 2022-09-10 | End: 2022-09-10 | Stop reason: SURG

## 2022-09-10 RX ORDER — HYDROCODONE BITARTRATE AND ACETAMINOPHEN 5; 325 MG/1; MG/1
1 TABLET ORAL ONCE AS NEEDED
Status: DISCONTINUED | OUTPATIENT
Start: 2022-09-10 | End: 2022-09-10

## 2022-09-10 RX ADMIN — HEPARIN SODIUM 5000 UNITS: 1000 INJECTION, SOLUTION INTRAVENOUS; SUBCUTANEOUS at 11:22

## 2022-09-10 RX ADMIN — SODIUM CHLORIDE, POTASSIUM CHLORIDE, SODIUM LACTATE AND CALCIUM CHLORIDE 125 ML/HR: 600; 310; 30; 20 INJECTION, SOLUTION INTRAVENOUS at 04:36

## 2022-09-10 RX ADMIN — PANTOPRAZOLE SODIUM 40 MG: 40 INJECTION, POWDER, FOR SOLUTION INTRAVENOUS at 05:03

## 2022-09-10 RX ADMIN — SUGAMMADEX 200 MG: 100 INJECTION, SOLUTION INTRAVENOUS at 12:14

## 2022-09-10 RX ADMIN — HEPARIN SODIUM 5000 UNITS: 1000 INJECTION, SOLUTION INTRAVENOUS; SUBCUTANEOUS at 10:46

## 2022-09-10 RX ADMIN — FLUOXETINE 20 MG: 20 CAPSULE ORAL at 08:53

## 2022-09-10 RX ADMIN — HYDROMORPHONE HYDROCHLORIDE 0.5 MG: 2 INJECTION, SOLUTION INTRAMUSCULAR; INTRAVENOUS; SUBCUTANEOUS at 11:19

## 2022-09-10 RX ADMIN — PROTAMINE SULFATE 25 MG: 10 INJECTION, SOLUTION INTRAVENOUS at 12:24

## 2022-09-10 RX ADMIN — ALPRAZOLAM 0.25 MG: 0.25 TABLET ORAL at 04:36

## 2022-09-10 RX ADMIN — MEPERIDINE HYDROCHLORIDE 25 MG: 25 INJECTION, SOLUTION INTRAMUSCULAR; INTRAVENOUS; SUBCUTANEOUS at 13:14

## 2022-09-10 RX ADMIN — LORAZEPAM 0.5 MG: 0.5 TABLET ORAL at 22:50

## 2022-09-10 RX ADMIN — Medication 10 ML: at 20:29

## 2022-09-10 RX ADMIN — PROTAMINE SULFATE 25 MG: 10 INJECTION, SOLUTION INTRAVENOUS at 12:19

## 2022-09-10 RX ADMIN — Medication 10 ML: at 08:56

## 2022-09-10 RX ADMIN — HYDROMORPHONE HYDROCHLORIDE 0.5 MG: 2 INJECTION, SOLUTION INTRAMUSCULAR; INTRAVENOUS; SUBCUTANEOUS at 11:35

## 2022-09-10 RX ADMIN — HEPARIN SODIUM 12 UNITS/KG/HR: 10000 INJECTION, SOLUTION INTRAVENOUS at 13:21

## 2022-09-10 RX ADMIN — Medication 10 ML: at 08:55

## 2022-09-10 RX ADMIN — FAMOTIDINE 20 MG: 20 TABLET ORAL at 08:53

## 2022-09-10 RX ADMIN — HEPARIN SODIUM 8.1 UNITS/KG/HR: 10000 INJECTION, SOLUTION INTRAVENOUS at 09:55

## 2022-09-10 RX ADMIN — ROCURONIUM BROMIDE 10 MG: 10 INJECTION INTRAVENOUS at 10:45

## 2022-09-10 RX ADMIN — CEFAZOLIN SODIUM 2 G: 2 INJECTION, SOLUTION INTRAVENOUS at 10:10

## 2022-09-10 RX ADMIN — HEPARIN SODIUM 5000 UNITS: 1000 INJECTION, SOLUTION INTRAVENOUS; SUBCUTANEOUS at 11:50

## 2022-09-10 RX ADMIN — HYDROMORPHONE HYDROCHLORIDE 0.5 MG: 2 INJECTION, SOLUTION INTRAMUSCULAR; INTRAVENOUS; SUBCUTANEOUS at 11:30

## 2022-09-10 RX ADMIN — HYDROCODONE BITARTRATE AND ACETAMINOPHEN 1 TABLET: 5; 325 TABLET ORAL at 20:28

## 2022-09-10 RX ADMIN — ALPRAZOLAM 0.25 MG: 0.25 TABLET ORAL at 17:12

## 2022-09-10 RX ADMIN — ONDANSETRON 4 MG: 2 INJECTION INTRAMUSCULAR; INTRAVENOUS at 12:44

## 2022-09-10 RX ADMIN — HYDROMORPHONE HYDROCHLORIDE 0.5 MG: 2 INJECTION, SOLUTION INTRAMUSCULAR; INTRAVENOUS; SUBCUTANEOUS at 11:25

## 2022-09-10 NOTE — OP NOTE
DATE OF PROCEDURE: September 10, 2022     Preoperative Diagnosis:   Bilateral pulmonary emboli  COVID-19 infection  Left iliofemoral DVT  IVC DVT  Left lower extremity engorgement, pain       Postoperative Diagnosis:  Same     Procedure(s):  1.  Supervision and interpretation of radiography fluoroscopy   2.  Third order cannulation of inferior vena cava  3.  Venography of the inferior vena cava, left common iliac vein, left external iliac vein, left femoral vein, popliteal vein  4.  Angioplasty of the inferior vena cava, left common iliac vein, left external iliac vein, and left femoral vein with 12 mm balloon  5.  Suction thromboembolectomy of the  inferior vena cava, left common iliac vein, left external iliac vein, and left femoral vein using the CAT 8 penumbra system     Surgeon(s):  Cooper Zhao MD Chaney, MD Dr. Cydney Vargas and Dr. Cochran participated equally in the case and should be considered co-surgeons.     Anesthesia: General endotracheal    Circulator: Nicole Mallory RN  Radiology Technologist: Jac Peralta  Scrub Person: Grecia Michelle      ANESTHESIA: General endotracheal anesthesia     ESTIMATED BLOOD LOSS: 600 mL.       Fluoroscopy time 18 minutes 48 seconds    Radiation dose delivered 2900 mGy    Total contrast volume delivered 70 mL of Isovue-300    INDICATIONS:  This is a 32-year-old woman with a history of obesity and recent COVID-19 infection who presented with recent bilateral pulmonary emboli and complex left iliofemoral DVT that extended into the inferior vena cava which was complicated by severe pain and edema at the left lower extremity.  This was discussed with her cardiologist Dr. Valenzuela and Dr. Martinez as well as multiple members of the cardiothoracic and vascular surgery division Dr. Lamas, Dr. Herman, and the patient was felt to be a reasonable candidate for ileofemoral DVT lysis and mechanical thrombectomy to prevent long-term sequelae of post thrombotic syndrome.  On  September 9, 2022 the patient underwent placement of a thrombolysis catheter at the level of the IVC and left iliofemoral vein system via the left popliteal vein and thrombolysis was continued for approximately 16 hours.  Her tPA had been stopped at least 1 hour prior to the procedure.  The patient returned to the operating room today for venogram and additional interventions.  The risks and benefits of surgery were discussed with the patient including pain, bleeding, infection, myocardial infarction and death. The patient understood these risks and wished to proceed with surgery.      DESCRIPTION OF PROCEDURE:  The patient was taken to the operating room and placed under general endotracheal anesthesia.  She was placed in the prone position.  Antibiotics and 5000 units of unfractionated heparin were given systemically, with additional bolusing of heparin systemically to maintain an ACT greater than 300 during the entire procedure.  The posterior left leg, previously placed 6 Djiboutian sheath and UniFuse infusion catheter and wire were prepped and draped in the usual sterile fashion..  A timeout was performed including the patient's name, procedure and antibiotic administration.  We began by removing the UniFuse catheter, and inserted guidewire advantage wire that was passed easily into the IVC.  We performed venography initially nonselectively and then selectively at the levels of the popliteal vein, femoral vein, iliac veins, and IVC.  There was still a large amount of remaining thrombotic material, and thus we pursued suction thrombectomy by upsizing to an 8 Djiboutian sheath and placing the penumbra CAT 8 device at the level of the femoral vein.  We performed multiple passes retrogradely from the femoral vein to the IVC, which was productive of a small amount of clot.  We performed another venogram, which showed improved flow, with a large amount of remaining thrombus.  We performed balloon angioplasty using an 8 mm  balloon throughout the length of the common femoral vein, iliac veins, and infrarenal IVC.  Additional venography revealed retained thrombotic material at the level of the left common iliac vein, and thus additional passes with the CAT 8 thromboembolectomy suction catheter were made.  Additional venography revealed improved flow through the iliofemoral system, with a large thrombus that remained at the level of the left common femoral vein.  We determined that additional passes with our suction thrombectomy catheter would likely yield minimal if any additional benefit, and the procedure was terminated.  We remove the gears and held manual pressure for over 30 minutes.  We gave a total of 50 mg of protamine due to prolonged bleeding at the popliteal vein puncture site.  All needle, sponge, and instrument counts were correct at the case completion.  I was present for the entire case.    Cooper Zhao M.D.   Cardiothoracic and Vascular Surgeon  [facility]

## 2022-09-10 NOTE — BRIEF OP NOTE
Cardiothoracic and Vascular Surgery Brief Operative Note    Urszula Gonzalez  9/10/2022    Pre-op Diagnosis:   Bilateral pulmonary emboli  COVID-19 infection  Left iliofemoral DVT  IVC DVT  Left lower extremity engorgement, pain       Post-Op Diagnosis Codes:  Same    Procedure/CPT® Codes:        Procedure(s):  1.  Supervision and interpretation of radiography fluoroscopy   2.  Third order cannulation of inferior vena cava  3.  Venography of the inferior vena cava, left common iliac vein, left external iliac vein, left femoral vein, popliteal vein  4.  Angioplasty of the inferior vena cava, left common iliac vein, left external iliac vein, and left femoral vein with 12 mm balloon  5.  Suction thromboembolectomy of the  inferior vena cava, left common iliac vein, left external iliac vein, and left femoral vein using the CAT 8 penumbra system    Surgeon(s):  Cooper Zhao MD Chaney, John H, MD    Anesthesia: Choice    Staff:   Circulator: Nicole Mallory RN  Radiology Technologist: Jac Peralta  Scrub Person: Grecia Michelle         Estimated Blood Loss: 850 mL    Urine Voided: * No values recorded between 9/10/2022  9:55 AM and 9/10/2022 11:59 AM *    Specimens:                Specimens     ID Source Type Tests Collected By Collected At Frozen?    A Femoral Artery Tissue · TISSUE PATHOLOGY EXAM   Cooper Zhao MD 9/10/22 1200     Description: ILEOFEMORAL EMBOLUS                Drains:   Urethral Catheter Silicone 16 Fr. (Active)   Daily Indications Hourly Output in Critical Unstable Patient requiring Frequent Intervention (hemodynamics, titration or life supportive therapy) 09/10/22 0800   Site Assessment Clean;Skin intact 09/10/22 0800   Collection Container Standard drainage bag 09/10/22 0800   Securement Method Securing device 09/10/22 0800   Catheter care complete Yes 09/10/22 0800   Output (mL) 400 mL 09/10/22 0800       Findings: Successful extraction of approximately 50% of thromboembolic material  at the left iliofemoral venous system and IVC         Complications: None          Cooper Zhao MD     Date: 9/10/2022  Time: 12:16 EDT

## 2022-09-10 NOTE — PROGRESS NOTES
Cardiology Progress Note      Reason for visit:    · Iliofemoral DVT    IDENTIFICATION: 32-year-old female who resides in [place], [place]    Active Hospital Problems    Diagnosis  POA   • **Acute pulmonary embolism without acute cor pulmonale (HCC) [I26.99]  Yes     · Echo (9/9/2022): LVEF 62%.  Normal RV size and normal RVSP     • Deep vein thrombosis (DVT) associated with COVID-19 [U07.1, I82.90]  Yes     Priority: High     · CT Abd/pelvis, 9/3/22 [place]: acute DVT within the left common femoral vein, left external iliac vein, left common iliac vein extending into the infrarenal IVC and almost to the level of renal veins.  · Localized thrombolysis catheter placed, 9/9/2022  · Attempted thrombectomy using CAT aspiration catheter with residual large thrombus burden, 9/10/2022     • Examination for normal comparison for clinical research [Z00.6]  Not Applicable   • Depression [F32.A]  Yes   • Gastroesophageal reflux disease without esophagitis [K21.9]  Yes            · Patient brought taken back to the OR today for relook angiography.  Residual ileal caval DVT present  · Aspiration Thrombectomy with CAT device yielded little thrombus             Vital Sign Min/Max for last 24 hours  Temp  Min: 97 °F (36.1 °C)  Max: 98.3 °F (36.8 °C)   BP  Min: 105/75  Max: 177/93   Pulse  Min: 48  Max: 102   Resp  Min: 18  Max: 22   SpO2  Min: 93 %  Max: 99 %   Flow (L/min)  Min: 2  Max: 4      Intake/Output Summary (Last 24 hours) at 9/10/2022 1500  Last data filed at 9/10/2022 1243  Gross per 24 hour   Intake 3637.2 ml   Output 4125 ml   Net -487.8 ml           Physical Exam  Constitutional:       Appearance: Normal appearance.   HENT:      Head: Normocephalic and atraumatic.   Cardiovascular:      Rate and Rhythm: Normal rate and regular rhythm.      Heart sounds: No murmur heard.  Neurological:      Mental Status: She is alert.         Tele: Sinus    Results Review (reviewed the patient's recent labs in the electronic  medical record):       Results from last 7 days   Lab Units 09/10/22  0032 09/09/22  1914 09/09/22  1228   SODIUM mmol/L 142 138 140   POTASSIUM mmol/L 4.3 4.5 4.0   CHLORIDE mmol/L 108* 104 104   BUN mg/dL 8 7 8   CREATININE mg/dL 0.77 0.79 0.77   MAGNESIUM mg/dL 2.0  --   --          Results from last 7 days   Lab Units 09/10/22  0643 09/10/22  0032 09/09/22  1914   WBC 10*3/mm3 14.91* 11.41* 12.08*   HEMOGLOBIN g/dL 10.0* 9.6* 9.8*   HEMATOCRIT % 32.9* 32.0* 31.7*   PLATELETS 10*3/mm3 268 278 293       Lab Results   Component Value Date    HGBA1C 5.83 (H) 10/09/2020       Lab Results   Component Value Date    AST 11 04/21/2021    ALT 6 04/21/2021              Ileal caval DVT  · Attempted thrombectomy today without resolution of the thrombus  · Further plans per CT surgery    Pulmonary embolus  · Continue anticoagulation               · No further cardiac recommendations at this time  · Please contact us if additional cardiology assistance needed    Electronically signed by Efe Lala IV, MD, 09/10/22, 2:58 PM EDT.

## 2022-09-10 NOTE — ANESTHESIA PROCEDURE NOTES
Airway  Urgency: elective    Date/Time: 9/10/2022 10:06 AM  Airway not difficult    General Information and Staff    Patient location during procedure: OR  CRNA/CAA: Joe Purvis CRNA    Indications and Patient Condition  Indications for airway management: airway protection    Preoxygenated: yes  MILS not maintained throughout  Mask difficulty assessment: 1 - vent by mask    Final Airway Details  Final airway type: endotracheal airway      Successful airway: ETT  Cuffed: yes   Successful intubation technique: direct laryngoscopy  Endotracheal tube insertion site: oral  Blade: [place]  Blade size: 3  ETT size (mm): 7.0  Cormack-Lehane Classification: grade I - full view of glottis  Placement verified by: chest auscultation and capnometry   Measured from: lips  ETT/EBT  to lips (cm): 20  Number of attempts at approach: 1  Assessment: lips, teeth, and gum same as pre-op and atraumatic intubation    Additional Comments  Negative epigastric sounds, Breath sound equal bilaterally with symmetric chest rise and fall

## 2022-09-10 NOTE — PLAN OF CARE
Goal Outcome Evaluation:  Plan of Care Reviewed With: patient, spouse, mother        Progress: no change    Patient returned to OR this shift for LLE thrombectomy. Venous sheath and TPA discontinued in the OR. Peripheral heparin gtt started per MD orders. Pulse remains dopplerable to LLE. Knee immobilizer in use. ACE wrap intact to LLE. 0 bleeding or hematoma noted. Patient noted with increased anxiety and very tearful post-op. MD notified and new orders obtained. 0 s/s of acute distress noted at this time. Will cont to follow.

## 2022-09-10 NOTE — ANESTHESIA PREPROCEDURE EVALUATION
Anesthesia Evaluation     Patient summary reviewed and Nursing notes reviewed   no history of anesthetic complications:  NPO Solid Status: > 8 hours  NPO Liquid Status: > 8 hours           Airway   Mallampati: II  TM distance: >3 FB  Neck ROM: full  No difficulty expected  Comment: Previous grade 1 view with Mac 3 blade  Dental    (+) poor dentition    Pulmonary - normal exam   (+) pulmonary embolism,     ROS comment: covid 2.5 weeks ago    Cardiovascular - normal exam    ECG reviewed    (+) hypertension, DVT current,     ROS comment: Echo Interpretation Summary 9/9/22    · Left ventricular ejection fraction appears to be 61 - 65%.  · Normal right ventricular cavity size, wall thickness, systolic function and septal motion noted.  · The tricuspid valve is structurally normal with no significant regurgitation present. Insufficient TR velocity profile to estimate the right ventricular systolic pressure.         large burden IVC and left iliofemoral DVT         Neuro/Psych  (+) psychiatric history Anxiety and Depression,    (-) seizures, TIA, CVA  GI/Hepatic/Renal/Endo    (+) morbid obesity, GERD,      Musculoskeletal     Abdominal    Substance History      OB/GYN    (+) pregnancy induced hypertension        Other   blood dyscrasia anemia,     ROS/Med Hx Other: Questionable clotting disorder  On TPA                    Anesthesia Plan    ASA 3     general     intravenous induction     Anesthetic plan, risks, benefits, and alternatives have been provided, discussed and informed consent has been obtained with: patient.    Use of blood products discussed with patient  Consented to blood products.   Plan discussed with CRNA.        CODE STATUS:

## 2022-09-10 NOTE — ANESTHESIA POSTPROCEDURE EVALUATION
Patient: Urszula Gonzalez    Procedure Summary     Date: 09/10/22 Room / Location: [facility] OR 01 /  LUZ MARIA HYBRID JULIO    Anesthesia Start: 0955 Anesthesia Stop: 1304    Procedure: ILEOFEMORAL EMBOLECTOMY, VENOGRAM (Left Thigh) Diagnosis:     Surgeons: Cooper Zhao MD Provider: Ceci Will MD    Anesthesia Type: general ASA Status: 3          Anesthesia Type: general    Vitals  Vitals Value Taken Time   /83 09/10/22 1400   Temp     Pulse 71 09/10/22 1401   Resp     SpO2 100 % 09/10/22 1401   Vitals shown include unvalidated device data.        Post Anesthesia Care and Evaluation    Patient location during evaluation: PACU  Patient participation: complete - patient participated  Level of consciousness: awake and alert  Pain management: adequate    Airway patency: patent  Anesthetic complications: No anesthetic complications  PONV Status: none  Cardiovascular status: hemodynamically stable and acceptable  Respiratory status: nonlabored ventilation, acceptable and nasal cannula  Hydration status: acceptable

## 2022-09-10 NOTE — PROGRESS NOTES
INTENSIVIST   PROGRESS NOTE     Hospital:  LOS: 1 day     Ms. Urszula Gonzalez, 32 y.o. female is followed for a Chief Complaint of: DVT      Subjective   S     Interval History:  She returned to the OR today and underwent relook angiography with aspiration thrombectomy.        The patient's relevant past medical, surgical and social history were reviewed and updated in Epic as appropriate.      ROS:   Constitutional: Negative for fever.   Respiratory: Negative for dyspnea.   Cardiovascular: Negative for chest pain.   Gastrointestinal: Negative for  nausea, vomiting and diarrhea.     Objective   O     Vitals:  Temp  Min: 98 °F (36.7 °C)  Max: 98.3 °F (36.8 °C)  BP  Min: 105/75  Max: 177/93  Pulse  Min: 48  Max: 102  Resp  Min: 18  Max: 23  SpO2  Min: 93 %  Max: 100 % Flow (L/min)  Min: 2  Max: 4    Intake/Ouptut 24 hrs (7:00AM - 6:59 AM)  Intake & Output (last 3 days)       09/07 0701  09/08 0700 09/08 0701  09/09 0700 09/09 0701  09/10 0700 09/10 0701  09/11 0700    I.V. (mL/kg)   2377.2 (19.5) 900 (7.4)    Blood    360    Total Intake(mL/kg)   2377.2 (19.5) 1260 (10.3)    Urine (mL/kg/hr)   2875 400 (0.3)    Blood    850    Total Output   2875 1250    Net   -497.8 +10                  Medications (drips):  heparin, Last Rate: 12 Units/kg/hr (09/10/22 1321)  HYDROmorphone HCl-NaCl  Pharmacy to Dose Heparin          Physical Examination  Telemetry:  Normal sinus rhythm.    Constitutional:  No acute distress.  Resting in bed comfortably.    Eyes: No scleral icterus.   PERRL, EOM intact.    Neck:  Supple, FROM   Cardiovascular: Normal rate, regular and rhythm. Normal heart sounds.  No murmurs, gallop or rub.   Respiratory: No respiratory distress. Normal respiratory effort.  Normal breath sounds  Clear to auscultation   Abdominal:  Soft. No masses. Nontender. No distension. No HSM.   Extremities: No digital cyanosis. No clubbing.  No peripheral edema.   Skin: No rashes, lesions or ulcers   Neurological:   Alert  and Oriented to person, place, and time.              Results from last 7 days   Lab Units 09/10/22  1621 09/10/22  0643 09/10/22  0032   WBC 10*3/mm3 16.03* 14.91* 11.41*   HEMOGLOBIN g/dL 10.0* 10.0* 9.6*   MCV fL 80.5 81.2 81.4   PLATELETS 10*3/mm3 237 268 278     Results from last 7 days   Lab Units 09/10/22  0032 09/09/22  1914 09/09/22  1228   SODIUM mmol/L 142 138 140   POTASSIUM mmol/L 4.3 4.5 4.0   CO2 mmol/L 23.0 21.0* 23.0   CREATININE mg/dL 0.77 0.79 0.77   GLUCOSE mg/dL 145* 114* 92   MAGNESIUM mg/dL 2.0  --   --    PHOSPHORUS mg/dL 3.8  --   --      Estimated Creatinine Clearance: 141 mL/min (by C-G formula based on SCr of 0.77 mg/dL).              Images:  Imaging Results (Last 24 Hours)     Procedure Component Value Units Date/Time    [facility] [place] OR Procedure [[id number]] Resulted: 09/10/22 1214     Updated: 09/10/22 1214    [facility] [place] OR Procedure [[id number]] Resulted: 09/09/22 1818     Updated: 09/09/22 1818            Results: Reviewed.  I reviewed the patient's new laboratory and imaging results.  I independently reviewed the patient's new images.    Medications: Reviewed.    Assessment & Plan   A / P     Ms. Gonzalez is a 31yo F who is admitted for a left lower extremity DVT with extension into the IVC almost to the level of the renal veins. She was taken to the OR on 9/9 by Dr. Zhao and underwent IVC cannulation and IV tPA. She was transferred to the ICU after the procedure.     She returned to the OR on 9/10 for re-look angiography and thrombectomy. Unfortunately, very little thrombus was able to be removed.     Nutrition:   Diet Clear Liquid  Advance Directives:   Code Status and Medical Interventions:   Ordered at: 09/09/22 1229     Level Of Support Discussed With:    Patient     Code Status (Patient has no pulse and is not breathing):    CPR (Attempt to Resuscitate)     Medical Interventions (Patient has pulse or is breathing):    Full Support       Active Hospital Problems    Diagnosis     • **Deep vein thrombosis (DVT) associated with COVID-19    • Acute pulmonary embolism without acute cor pulmonale (HCC)    • Examination for normal comparison for clinical research    • Depression    • Gastroesophageal reflux disease without esophagitis        Assessment / Plan:    Continue ICU care  Heparin drip.   Pain control with PCA  Bedrest  Home Prozac  Back to the OR on Monday for possible venogram and thrombectomy via the femoral vein  CLD  AM labs      High level of risk due to:  severe exacerbation of chronic illness and illness with threat to life or bodily function.    Plan of care and goals reviewed during interdisciplinary rounds.  I discussed the patient's findings and my recommendations with patient, family and nursing staff        May Ervin, DO    Intensive Care Medicine and Pulmonary Medicine

## 2022-09-10 NOTE — PROGRESS NOTES
HEPARIN INFUSION  Urszula Gonzalez is a  32 y.o. female receiving heparin infusion.     Therapy for (VTE/Cardiac): VTE  Patient Weight: 122 kg  Initial Bolus (Y/N): No  Any Bolus (Y/N):  No    Signs or Symptoms of Bleeding:       VTE (PE/DVT)   Initial Bolus: 80 units/kg (Max 10,000 units)  Initial rate: 18 units/kg/hr (Max 1,500 units/hr)    Anti Xa Rebolus Infusion Hold time Change infusion Dose (Units/kg/hr) Next Anti Xa Level Due   < 0.11 50 Units/kg  (4000 Units Max) None Increase by  4 Units/kg/hr 6 hours   0.11 - 0.19 25 Units/kg  (2000 Units Max) None Increase by  3 Units/kg/hr 6 hours   0.2 - 0.29 0 None Increase by  2 Units/kg/hr 6 hours   0.3 - 0.7 0 None No Change 6 hours (after 2 consecutive levels in range check qAM)   0.71 - 0.8 0 None Decrease by  1 Units/kg/hr 6 hours   0.81 - 0.9 0 None Decrease by  2 Units/kg/hr 6 hours   0.91 - 1 0 60 Minutes Decrease by  3 Units/kg/hr 6 hours   >1 0 Hold  After Anti Xa less than 0.7 decrease previous rate by  4 Units/kg/hr  Every 2 hours until Anti Xa is less than 0.7 then when infusion restarts in 6 hours     Results from last 7 days   Lab Units 09/10/22  0643 09/10/22  0032 09/09/22  1914 09/09/22  1228   INR   --   --   --  1.41*   HEMOGLOBIN g/dL 10.0* 9.6* 9.8* 9.9*   HEMATOCRIT % 32.9* 32.0* 31.7* 32.0*   PLATELETS 10*3/mm3 268 278 293 292          Date   Time   Anti-Xa Current Rate (Unit/kg/hr) Bolus   (Units) Rate Change   (Unit/kg/hr) New Rate (Unit/kg/hr) Next   Anti-Xa Comments  Pump Check Daily   9/10 -- pending 1000U/hr (turned off during procedure) -- 12U/kg/hr 12 2000 Heparin previously running at a set rate. Now  back to dosing per protocol . Frantz RN       --           --           --           --           --           --           --           --           --           --           --           --           --           --           --           --           --           --           --           --         Lori Das,  PharmD  9/10/2022  13:21 EDT

## 2022-09-10 NOTE — PROGRESS NOTES
Cardiothoracic Surgery Progress Note      POD # 1 s/p Venogram, TPA catheter insertion popliteal vein     LOS: 1 day      Subjective:  Pain in left leg improved today.  Denies shortness of breath    Objective:  Vital Signs  Temp:  [97 °F (36.1 °C)-98.3 °F (36.8 °C)] 98.3 °F (36.8 °C)  Heart Rate:  [] 87  Resp:  [18-22] 22  BP: (105-177)/() 149/94    Physical Exam:   General Appearance: alert, appears stated age and cooperative   Lungs: clear to auscultation, respirations regular, respirations even and respirations unlabored   Heart: regular rhythm & normal rate, normal S1, S2 and no murmur, no gallop, no rub   Skin: Incision c/d/i   Left foot warm, 2+ DP pulse, motor and sensation intact  Results:  Results from last 7 days   Lab Units 09/10/22  0643   WBC 10*3/mm3 14.91*   HEMOGLOBIN g/dL 10.0*   HEMATOCRIT % 32.9*   PLATELETS 10*3/mm3 268     Results from last 7 days   Lab Units 09/10/22  0032   SODIUM mmol/L 142   POTASSIUM mmol/L 4.3   CHLORIDE mmol/L 108*   CO2 mmol/L 23.0   BUN mg/dL 8   CREATININE mg/dL 0.77   GLUCOSE mg/dL 145*   CALCIUM mg/dL 8.8       Assessment:  POD # 1 s/p Venogram, TPA catheter insertion popliteal vein  Ileofemoral and IVC DVT    Plan:  Continue heparin gtt  Possible venogram and thrombectomy on Monday via femoral vein     Addison Lamas MD  09/10/22  14:30 EDT

## 2022-09-10 NOTE — PLAN OF CARE
Goal Outcome Evaluation:      Pt. Neuro intact, on bedrest, very anxious about procedure today: Xanax given x 2.  On 2-4 L O2 NC.  Rhythm SB-SR (rate 49-80s). -130s. TPN infusing through catheter and heparin drip infusing through sheath. LR continues at 125 ml/hr. Pt. Was having lots of pain in left leg/foot: Dilaudid PCA started, pain has been improving since. Pulses palpable/doppler in left foot.

## 2022-09-11 LAB
ANION GAP SERPL CALCULATED.3IONS-SCNC: 11 MMOL/L (ref 5–15)
BASOPHILS # BLD AUTO: 0.06 10*3/MM3 (ref 0–0.2)
BASOPHILS NFR BLD AUTO: 0.4 % (ref 0–1.5)
BUN SERPL-MCNC: 12 MG/DL (ref 6–20)
BUN/CREAT SERPL: 16.2 (ref 7–25)
CALCIUM SPEC-SCNC: 8 MG/DL (ref 8.6–10.5)
CHLORIDE SERPL-SCNC: 109 MMOL/L (ref 98–107)
CO2 SERPL-SCNC: 22 MMOL/L (ref 22–29)
CREAT SERPL-MCNC: 0.74 MG/DL (ref 0.57–1)
DEPRECATED RDW RBC AUTO: 48.3 FL (ref 37–54)
EGFRCR SERPLBLD CKD-EPI 2021: 111.1 ML/MIN/1.73
EOSINOPHIL # BLD AUTO: 0.06 10*3/MM3 (ref 0–0.4)
EOSINOPHIL NFR BLD AUTO: 0.4 % (ref 0.3–6.2)
ERYTHROCYTE [DISTWIDTH] IN BLOOD BY AUTOMATED COUNT: 16.3 % (ref 12.3–15.4)
GLUCOSE SERPL-MCNC: 140 MG/DL (ref 65–99)
HCT VFR BLD AUTO: 28.7 % (ref 34–46.6)
HGB BLD-MCNC: 9 G/DL (ref 12–15.9)
IMM GRANULOCYTES # BLD AUTO: 0.26 10*3/MM3 (ref 0–0.05)
IMM GRANULOCYTES NFR BLD AUTO: 1.9 % (ref 0–0.5)
LYMPHOCYTES # BLD AUTO: 4.13 10*3/MM3 (ref 0.7–3.1)
LYMPHOCYTES NFR BLD AUTO: 29.9 % (ref 19.6–45.3)
MAGNESIUM SERPL-MCNC: 1.9 MG/DL (ref 1.6–2.6)
MCH RBC QN AUTO: 25.4 PG (ref 26.6–33)
MCHC RBC AUTO-ENTMCNC: 31.4 G/DL (ref 31.5–35.7)
MCV RBC AUTO: 81.1 FL (ref 79–97)
MONOCYTES # BLD AUTO: 1.31 10*3/MM3 (ref 0.1–0.9)
MONOCYTES NFR BLD AUTO: 9.5 % (ref 5–12)
NEUTROPHILS NFR BLD AUTO: 57.9 % (ref 42.7–76)
NEUTROPHILS NFR BLD AUTO: 7.98 10*3/MM3 (ref 1.7–7)
NRBC BLD AUTO-RTO: 1.2 /100 WBC (ref 0–0.2)
PHOSPHATE SERPL-MCNC: 3.3 MG/DL (ref 2.5–4.5)
PLATELET # BLD AUTO: 230 10*3/MM3 (ref 140–450)
PMV BLD AUTO: 9.5 FL (ref 6–12)
POTASSIUM SERPL-SCNC: 4 MMOL/L (ref 3.5–5.2)
QT INTERVAL: 450 MS
QTC INTERVAL: 426 MS
RBC # BLD AUTO: 3.54 10*6/MM3 (ref 3.77–5.28)
SODIUM SERPL-SCNC: 142 MMOL/L (ref 136–145)
UFH PPP CHRO-ACNC: 0.31 IU/ML (ref 0.3–0.7)
UFH PPP CHRO-ACNC: 0.4 IU/ML (ref 0.3–0.7)
WBC NRBC COR # BLD: 13.8 10*3/MM3 (ref 3.4–10.8)

## 2022-09-11 PROCEDURE — 83735 ASSAY OF MAGNESIUM: CPT | Performed by: INTERNAL MEDICINE

## 2022-09-11 PROCEDURE — 84100 ASSAY OF PHOSPHORUS: CPT | Performed by: INTERNAL MEDICINE

## 2022-09-11 PROCEDURE — 85025 COMPLETE CBC W/AUTO DIFF WBC: CPT | Performed by: PHYSICIAN ASSISTANT

## 2022-09-11 PROCEDURE — 80048 BASIC METABOLIC PNL TOTAL CA: CPT | Performed by: INTERNAL MEDICINE

## 2022-09-11 PROCEDURE — 0 MAGNESIUM SULFATE 4 GM/100ML SOLUTION: Performed by: NURSE PRACTITIONER

## 2022-09-11 PROCEDURE — 99232 SBSQ HOSP IP/OBS MODERATE 35: CPT | Performed by: INTERNAL MEDICINE

## 2022-09-11 PROCEDURE — 85520 HEPARIN ASSAY: CPT | Performed by: STUDENT IN AN ORGANIZED HEALTH CARE EDUCATION/TRAINING PROGRAM

## 2022-09-11 PROCEDURE — 85520 HEPARIN ASSAY: CPT

## 2022-09-11 PROCEDURE — 25010000002 HEPARIN (PORCINE) 25000-0.45 UT/250ML-% SOLUTION

## 2022-09-11 RX ORDER — CEFAZOLIN SODIUM 2 G/100ML
2 INJECTION, SOLUTION INTRAVENOUS
Status: COMPLETED | OUTPATIENT
Start: 2022-09-12 | End: 2022-09-12

## 2022-09-11 RX ORDER — HYDROCODONE BITARTRATE AND ACETAMINOPHEN 5; 325 MG/1; MG/1
2 TABLET ORAL EVERY 4 HOURS PRN
Status: DISCONTINUED | OUTPATIENT
Start: 2022-09-11 | End: 2022-09-14 | Stop reason: HOSPADM

## 2022-09-11 RX ORDER — MAGNESIUM SULFATE HEPTAHYDRATE 40 MG/ML
4 INJECTION, SOLUTION INTRAVENOUS AS NEEDED
Status: DISCONTINUED | OUTPATIENT
Start: 2022-09-11 | End: 2022-09-14 | Stop reason: HOSPADM

## 2022-09-11 RX ORDER — MAGNESIUM SULFATE HEPTAHYDRATE 40 MG/ML
2 INJECTION, SOLUTION INTRAVENOUS AS NEEDED
Status: DISCONTINUED | OUTPATIENT
Start: 2022-09-11 | End: 2022-09-14 | Stop reason: HOSPADM

## 2022-09-11 RX ADMIN — FLUOXETINE 20 MG: 20 CAPSULE ORAL at 09:03

## 2022-09-11 RX ADMIN — PANTOPRAZOLE SODIUM 40 MG: 40 INJECTION, POWDER, FOR SOLUTION INTRAVENOUS at 06:41

## 2022-09-11 RX ADMIN — LORAZEPAM 0.5 MG: 0.5 TABLET ORAL at 07:00

## 2022-09-11 RX ADMIN — HEPARIN SODIUM 14 UNITS/KG/HR: 10000 INJECTION, SOLUTION INTRAVENOUS at 03:46

## 2022-09-11 RX ADMIN — HEPARIN SODIUM 14 UNITS/KG/HR: 10000 INJECTION, SOLUTION INTRAVENOUS at 17:11

## 2022-09-11 RX ADMIN — HYDROCODONE BITARTRATE AND ACETAMINOPHEN 2 TABLET: 5; 325 TABLET ORAL at 09:48

## 2022-09-11 RX ADMIN — HYDROCODONE BITARTRATE AND ACETAMINOPHEN 2 TABLET: 5; 325 TABLET ORAL at 18:44

## 2022-09-11 RX ADMIN — Medication 10 ML: at 09:04

## 2022-09-11 RX ADMIN — HYDROCODONE BITARTRATE AND ACETAMINOPHEN 2 TABLET: 5; 325 TABLET ORAL at 13:48

## 2022-09-11 RX ADMIN — HYDROCODONE BITARTRATE AND ACETAMINOPHEN 2 TABLET: 5; 325 TABLET ORAL at 22:59

## 2022-09-11 RX ADMIN — MAGNESIUM SULFATE IN WATER 4 G: 40 INJECTION, SOLUTION INTRAVENOUS at 06:47

## 2022-09-11 RX ADMIN — HYDROCODONE BITARTRATE AND ACETAMINOPHEN 1 TABLET: 5; 325 TABLET ORAL at 03:46

## 2022-09-11 RX ADMIN — Medication 10 ML: at 21:33

## 2022-09-11 RX ADMIN — LORAZEPAM 0.5 MG: 0.5 TABLET ORAL at 19:30

## 2022-09-11 RX ADMIN — LORAZEPAM 0.5 MG: 0.5 TABLET ORAL at 13:11

## 2022-09-11 NOTE — PROGRESS NOTES
Cardiothoracic Surgery Progress Note      POD # 1/2 s/p Venogram, TPA catheter insertion popliteal vein, thrombectomy     LOS: 2 days      Subjective:  Some pain in left leg.  Denies shortness of breath    Objective:  Vital Signs  Temp:  [97.6 °F (36.4 °C)-98 °F (36.7 °C)] 97.7 °F (36.5 °C)  Heart Rate:  [] 68  Resp:  [17-23] 20  BP: ()/() 112/64    Physical Exam:   General Appearance: alert, appears stated age and cooperative   Lungs: clear to auscultation, respirations regular, respirations even and respirations unlabored   Heart: regular rhythm & normal rate, normal S1, S2 and no murmur, no gallop, no rub   Skin: Incision c/d/i   Left foot warm, 2+ DP pulse palpable, motor and sensation intact, bilateral lower extremity edema  Results:    Results from last 7 days   Lab Units 09/11/22  0349   WBC 10*3/mm3 13.80*   HEMOGLOBIN g/dL 9.0*   HEMATOCRIT % 28.7*   PLATELETS 10*3/mm3 230     Results from last 7 days   Lab Units 09/11/22  0349   SODIUM mmol/L 142   POTASSIUM mmol/L 4.0   CHLORIDE mmol/L 109*   CO2 mmol/L 22.0   BUN mg/dL 12   CREATININE mg/dL 0.74   GLUCOSE mg/dL 140*   CALCIUM mg/dL 8.0*       Assessment:  POD # 1 s/p Venogram, TPA catheter insertion popliteal vein  Ileofemoral and IVC DVT    Plan:  Continue heparin gtt  Possible venogram and thrombectomy on Monday via femoral vein/IJ  Bedrest    Addison Lamas MD  09/11/22  11:18 EDT

## 2022-09-11 NOTE — PROGRESS NOTES
HEPARIN INFUSION  Urszula Gonzalez is a  32 y.o. female receiving heparin infusion.     Therapy for (VTE/Cardiac): VTE  Patient Weight: 122 kg  Initial Bolus (Y/N): No  Any Bolus (Y/N):  No    Signs or Symptoms of Bleeding:       VTE (PE/DVT)   Initial Bolus: 80 units/kg (Max 10,000 units)  Initial rate: 18 units/kg/hr (Max 1,500 units/hr)    Anti Xa Rebolus Infusion Hold time Change infusion Dose (Units/kg/hr) Next Anti Xa Level Due   < 0.11 50 Units/kg  (4000 Units Max) None Increase by  4 Units/kg/hr 6 hours   0.11 - 0.19 25 Units/kg  (2000 Units Max) None Increase by  3 Units/kg/hr 6 hours   0.2 - 0.29 0 None Increase by  2 Units/kg/hr 6 hours   0.3 - 0.7 0 None No Change 6 hours (after 2 consecutive levels in range check qAM)   0.71 - 0.8 0 None Decrease by  1 Units/kg/hr 6 hours   0.81 - 0.9 0 None Decrease by  2 Units/kg/hr 6 hours   0.91 - 1 0 60 Minutes Decrease by  3 Units/kg/hr 6 hours   >1 0 Hold  After Anti Xa less than 0.7 decrease previous rate by  4 Units/kg/hr  Every 2 hours until Anti Xa is less than 0.7 then when infusion restarts in 6 hours     Results from last 7 days   Lab Units 09/11/22  0349 09/10/22  1621 09/10/22  0643 09/09/22  1914 09/09/22  1228   INR   --   --   --   --  1.41*   HEMOGLOBIN g/dL 9.0* 10.0* 10.0*   < > 9.9*   HEMATOCRIT % 28.7* 32.1* 32.9*   < > 32.0*   PLATELETS 10*3/mm3 230 237 268   < > 292    < > = values in this interval not displayed.          Date   Time   Anti-Xa Current Rate (Unit/kg/hr) Bolus   (Units) Rate Change   (Unit/kg/hr) New Rate (Unit/kg/hr) Next   Anti-Xa Comments  Pump Check Daily   9/10 -- pending 1000U/hr (turned off during procedure) -- 12U/kg/hr 12 2000 Heparin previously running at a set rate. Now  back to dosing per protocol . Frantz ROSADO   9-10 2049 0.27 12 -- +2 14 0400 D/W Marcos    9/11 0349 0.31 14 -- -- 14 1000 D/w ALONZO Lantigua   9/11 1145 0.4 14 -- -- 14 0600 Frantz Rosado. Pump verfied       --           --           --           --           --            --           --           --           --           --           --           --           --           --           --           --           --

## 2022-09-11 NOTE — PLAN OF CARE
Goal Outcome Evaluation:      Pt. Neuro intact, on bedrest. Pt. Having some anxiety, ativan given x 2. On room air. Sinus rhythm, rate . SBP . All pedal pulses palpable, except for the left PT is found with the doppler. Pt. Tolerating regular diet. 490 ml UOP. Pt. Having some pain in left leg/lower abdomen area: PRN lortab given. Heparin drip continues.

## 2022-09-11 NOTE — PROGRESS NOTES
INTENSIVIST   PROGRESS NOTE     Hospital:  LOS: 2 days     Ms. Urszula Gonzalez, 32 y.o. female is followed for a Chief Complaint of: DVT      Subjective   S     Interval History:  No acute events overnight.        The patient's relevant past medical, surgical and social history were reviewed and updated in Epic as appropriate.      ROS:   Constitutional: Negative for fever.   Respiratory: Negative for dyspnea.   Cardiovascular: Negative for chest pain.   Gastrointestinal: Negative for  nausea, vomiting and diarrhea.     Objective   O     Vitals:  Temp  Min: 97.6 °F (36.4 °C)  Max: 98 °F (36.7 °C)  BP  Min: 99/60  Max: 177/93  Pulse  Min: 64  Max: 105  Resp  Min: 17  Max: 23  SpO2  Min: 91 %  Max: 100 % Flow (L/min)  Min: 2  Max: 4    Intake/Ouptut 24 hrs (7:00AM - 6:59 AM)  Intake & Output (last 3 days)       09/08 0701  09/09 0700 09/09 0701  09/10 0700 09/10 0701  09/11 0700 09/11 0701  09/12 0700    P.O.   880     I.V. (mL/kg)  2377.2 (19.5) 1720.8 (14.1)     Blood   360     Total Intake(mL/kg)  2377.2 (19.5) 2960.8 (24.3)     Urine (mL/kg/hr)  2875 2465 (0.8) 275 (0.5)    Blood   850     Total Output  2875 3315 275    Net  -497.8 -354.2 -275                  Medications (drips):  heparin, Last Rate: 14 Units/kg/hr (09/11/22 0346)  Pharmacy to Dose Heparin          Physical Examination  Telemetry:  Normal sinus rhythm.    Constitutional:  No acute distress.  Resting in bed comfortably.    Eyes: No scleral icterus.   PERRL, EOM intact.    Neck:  Supple, FROM   Cardiovascular: Normal rate, regular and rhythm. Normal heart sounds.  No murmurs, gallop or rub.   Respiratory: No respiratory distress. Normal respiratory effort.  Normal breath sounds  Clear to auscultation   Abdominal:  Soft. No masses. Nontender. No distension. No HSM.   Extremities: No digital cyanosis. No clubbing.  No peripheral edema.   Skin: No rashes, lesions or ulcers   Neurological:   Alert and Oriented to person, place, and time.               Results from last 7 days   Lab Units 09/11/22  0349 09/10/22  1621 09/10/22  0643   WBC 10*3/mm3 13.80* 16.03* 14.91*   HEMOGLOBIN g/dL 9.0* 10.0* 10.0*   MCV fL 81.1 80.5 81.2   PLATELETS 10*3/mm3 230 237 268     Results from last 7 days   Lab Units 09/11/22  0349 09/10/22  0032 09/09/22  1914   SODIUM mmol/L 142 142 138   POTASSIUM mmol/L 4.0 4.3 4.5   CO2 mmol/L 22.0 23.0 21.0*   CREATININE mg/dL 0.74 0.77 0.79   GLUCOSE mg/dL 140* 145* 114*   MAGNESIUM mg/dL 1.9 2.0  --    PHOSPHORUS mg/dL 3.3 3.8  --      Estimated Creatinine Clearance: 146.8 mL/min (by C-G formula based on SCr of 0.74 mg/dL).              Images:  Imaging Results (Last 24 Hours)     Procedure Component Value Units Date/Time    XR [place] OR Procedure [[id number]] Resulted: 09/10/22 1214     Updated: 09/10/22 1214            Results: Reviewed.  I reviewed the patient's new laboratory and imaging results.  I independently reviewed the patient's new images.    Medications: Reviewed.    Assessment & Plan   A / P     Ms. Gonzalez is a 33yo F who is admitted for a left lower extremity DVT with extension into the IVC almost to the level of the renal veins. She was taken to the OR on 9/9 by Dr. Zhao and underwent IVC cannulation and IV tPA. She was transferred to the ICU after the procedure.     She returned to the OR on 9/10 for re-look angiography and thrombectomy. Unfortunately, very little thrombus was able to be removed.     Nutrition:   Diet Regular  NPO Diet NPO Type: Sips with Meds  Advance Directives:   Code Status and Medical Interventions:   Ordered at: 09/09/22 1229     Level Of Support Discussed With:    Patient     Code Status (Patient has no pulse and is not breathing):    CPR (Attempt to Resuscitate)     Medical Interventions (Patient has pulse or is breathing):    Full Support       Active Hospital Problems    Diagnosis    • **Deep vein thrombosis (DVT) associated with COVID-19    • Acute pulmonary embolism without acute cor  pulmonale (HCC)    • Examination for normal comparison for clinical research    • Depression    • Gastroesophageal reflux disease without esophagitis        Assessment / Plan:    Continue ICU care  Heparin drip.   Pain control with PCA  Bedrest  Back to the OR on Monday for possible venogram and thrombectomy via the femoral vein  Regular diet and NPO after MN.  AM labs      High level of risk due to:  severe exacerbation of chronic illness and illness with threat to life or bodily function.    Plan of care and goals reviewed during interdisciplinary rounds.  I discussed the patient's findings and my recommendations with patient, family and nursing staff        May Ervin, DO    Intensive Care Medicine and Pulmonary Medicine

## 2022-09-12 ENCOUNTER — APPOINTMENT (OUTPATIENT)
Dept: GENERAL RADIOLOGY | Facility: HOSPITAL | Age: 32
End: 2022-09-12

## 2022-09-12 ENCOUNTER — ANESTHESIA EVENT (OUTPATIENT)
Dept: PERIOP | Facility: HOSPITAL | Age: 32
End: 2022-09-12

## 2022-09-12 ENCOUNTER — APPOINTMENT (OUTPATIENT)
Dept: CARDIOLOGY | Facility: HOSPITAL | Age: 32
End: 2022-09-12

## 2022-09-12 ENCOUNTER — ANESTHESIA (OUTPATIENT)
Dept: PERIOP | Facility: HOSPITAL | Age: 32
End: 2022-09-12

## 2022-09-12 LAB
ANION GAP SERPL CALCULATED.3IONS-SCNC: 8 MMOL/L (ref 5–15)
BH BB BLOOD EXPIRATION DATE: NORMAL
BH BB BLOOD TYPE BARCODE: 6200
BH BB DISPENSE STATUS: NORMAL
BH BB PRODUCT CODE: NORMAL
BH BB UNIT NUMBER: NORMAL
BH CV ECHO MEAS - EDV(CUBED): 85.2 ML
BH CV ECHO MEAS - EDV(MOD-SP2): 96.8 ML
BH CV ECHO MEAS - EDV(MOD-SP4): 81.9 ML
BH CV ECHO MEAS - EF(MOD-BP): 69.7 %
BH CV ECHO MEAS - EF(MOD-SP2): 68.9 %
BH CV ECHO MEAS - EF(MOD-SP4): 70.1 %
BH CV ECHO MEAS - ESV(CUBED): 13.8 ML
BH CV ECHO MEAS - ESV(MOD-SP2): 30.1 ML
BH CV ECHO MEAS - ESV(MOD-SP4): 24.5 ML
BH CV ECHO MEAS - FS: 45.5 %
BH CV ECHO MEAS - IVS/LVPW: 1 CM
BH CV ECHO MEAS - IVSD: 1 CM
BH CV ECHO MEAS - LA DIMENSION: 3.2 CM
BH CV ECHO MEAS - LV DIASTOLIC VOL/BSA (35-75): 36.6 CM2
BH CV ECHO MEAS - LV MASS(C)D: 147.8 GRAMS
BH CV ECHO MEAS - LV SYSTOLIC VOL/BSA (12-30): 10.9 CM2
BH CV ECHO MEAS - LVIDD: 4.4 CM
BH CV ECHO MEAS - LVIDS: 2.4 CM
BH CV ECHO MEAS - LVPWD: 1 CM
BH CV ECHO MEAS - RAP SYSTOLE: 3 MMHG
BH CV ECHO MEAS - SI(MOD-SP2): 29.8 ML/M2
BH CV ECHO MEAS - SI(MOD-SP4): 25.6 ML/M2
BH CV ECHO MEAS - SV(MOD-SP2): 66.7 ML
BH CV ECHO MEAS - SV(MOD-SP4): 57.4 ML
BH CV ECHO MEAS RV FREE WALL STRAIN: -25.9 %
BH CV VAS BP RIGHT ARM: NORMAL MMHG
BH CV XLRA - RV BASE: 3 CM
BH CV XLRA - RV LENGTH: 6.4 CM
BH CV XLRA - RV MID: 2.9 CM
BUN SERPL-MCNC: 10 MG/DL (ref 6–20)
BUN/CREAT SERPL: 14.5 (ref 7–25)
CALCIUM SPEC-SCNC: 8 MG/DL (ref 8.6–10.5)
CHLORIDE SERPL-SCNC: 110 MMOL/L (ref 98–107)
CO2 SERPL-SCNC: 24 MMOL/L (ref 22–29)
CREAT SERPL-MCNC: 0.69 MG/DL (ref 0.57–1)
CROSSMATCH INTERPRETATION: NORMAL
DEPRECATED RDW RBC AUTO: 49.8 FL (ref 37–54)
EGFRCR SERPLBLD CKD-EPI 2021: 119.2 ML/MIN/1.73
ERYTHROCYTE [DISTWIDTH] IN BLOOD BY AUTOMATED COUNT: 16.7 % (ref 12.3–15.4)
GLUCOSE SERPL-MCNC: 105 MG/DL (ref 65–99)
HCT VFR BLD AUTO: 29.4 % (ref 34–46.6)
HGB BLD-MCNC: 8.9 G/DL (ref 12–15.9)
MAGNESIUM SERPL-MCNC: 2.1 MG/DL (ref 1.6–2.6)
MAXIMAL PREDICTED HEART RATE: 189 BPM
MCH RBC QN AUTO: 25.1 PG (ref 26.6–33)
MCHC RBC AUTO-ENTMCNC: 30.3 G/DL (ref 31.5–35.7)
MCV RBC AUTO: 83.1 FL (ref 79–97)
PHOSPHATE SERPL-MCNC: 4.4 MG/DL (ref 2.5–4.5)
PLATELET # BLD AUTO: 252 10*3/MM3 (ref 140–450)
PMV BLD AUTO: 9.9 FL (ref 6–12)
POTASSIUM SERPL-SCNC: 4.1 MMOL/L (ref 3.5–5.2)
RBC # BLD AUTO: 3.54 10*6/MM3 (ref 3.77–5.28)
SODIUM SERPL-SCNC: 142 MMOL/L (ref 136–145)
STRESS TARGET HR: 161 BPM
UFH PPP CHRO-ACNC: 0.17 IU/ML (ref 0.3–0.7)
UFH PPP CHRO-ACNC: 0.43 IU/ML (ref 0.3–0.7)
UFH PPP CHRO-ACNC: >1.1 IU/ML (ref 0.3–0.7)
UFH PPP CHRO-ACNC: >1.1 IU/ML (ref 0.3–0.7)
UNIT  ABO: NORMAL
UNIT  RH: NORMAL
WBC NRBC COR # BLD: 11.92 10*3/MM3 (ref 3.4–10.8)

## 2022-09-12 PROCEDURE — 93308 TTE F-UP OR LMTD: CPT | Performed by: INTERNAL MEDICINE

## 2022-09-12 PROCEDURE — 93325 DOPPLER ECHO COLOR FLOW MAPG: CPT

## 2022-09-12 PROCEDURE — 85520 HEPARIN ASSAY: CPT

## 2022-09-12 PROCEDURE — B5191ZA FLUOROSCOPY OF INFERIOR VENA CAVA USING LOW OSMOLAR CONTRAST, GUIDANCE: ICD-10-PCS | Performed by: STUDENT IN AN ORGANIZED HEALTH CARE EDUCATION/TRAINING PROGRAM

## 2022-09-12 PROCEDURE — C1894 INTRO/SHEATH, NON-LASER: HCPCS | Performed by: STUDENT IN AN ORGANIZED HEALTH CARE EDUCATION/TRAINING PROGRAM

## 2022-09-12 PROCEDURE — 80048 BASIC METABOLIC PNL TOTAL CA: CPT | Performed by: INTERNAL MEDICINE

## 2022-09-12 PROCEDURE — 25010000002 HEPARIN (PORCINE) 25000-0.45 UT/250ML-% SOLUTION

## 2022-09-12 PROCEDURE — 25010000002 ONDANSETRON PER 1 MG: Performed by: NURSE ANESTHETIST, CERTIFIED REGISTERED

## 2022-09-12 PROCEDURE — 93010 ELECTROCARDIOGRAM REPORT: CPT | Performed by: INTERNAL MEDICINE

## 2022-09-12 PROCEDURE — 99233 SBSQ HOSP IP/OBS HIGH 50: CPT | Performed by: INTERNAL MEDICINE

## 2022-09-12 PROCEDURE — B51V1ZA FLUOROSCOPY OF OTHER VEINS USING LOW OSMOLAR CONTRAST, GUIDANCE: ICD-10-PCS | Performed by: STUDENT IN AN ORGANIZED HEALTH CARE EDUCATION/TRAINING PROGRAM

## 2022-09-12 PROCEDURE — 37191 INS ENDOVAS VENA CAVA FILTR: CPT | Performed by: STUDENT IN AN ORGANIZED HEALTH CARE EDUCATION/TRAINING PROGRAM

## 2022-09-12 PROCEDURE — 75825 VEIN X-RAY TRUNK: CPT

## 2022-09-12 PROCEDURE — 06H03DZ INSERTION OF INTRALUMINAL DEVICE INTO INFERIOR VENA CAVA, PERCUTANEOUS APPROACH: ICD-10-PCS | Performed by: STUDENT IN AN ORGANIZED HEALTH CARE EDUCATION/TRAINING PROGRAM

## 2022-09-12 PROCEDURE — 25010000002 CEFAZOLIN IN DEXTROSE 2-4 GM/100ML-% SOLUTION: Performed by: PHYSICIAN ASSISTANT

## 2022-09-12 PROCEDURE — 25010000002 CEFAZOLIN PER 500 MG: Performed by: STUDENT IN AN ORGANIZED HEALTH CARE EDUCATION/TRAINING PROGRAM

## 2022-09-12 PROCEDURE — 067D3ZZ DILATION OF LEFT COMMON ILIAC VEIN, PERCUTANEOUS APPROACH: ICD-10-PCS | Performed by: STUDENT IN AN ORGANIZED HEALTH CARE EDUCATION/TRAINING PROGRAM

## 2022-09-12 PROCEDURE — C1769 GUIDE WIRE: HCPCS | Performed by: STUDENT IN AN ORGANIZED HEALTH CARE EDUCATION/TRAINING PROGRAM

## 2022-09-12 PROCEDURE — 06C03ZZ EXTIRPATION OF MATTER FROM INFERIOR VENA CAVA, PERCUTANEOUS APPROACH: ICD-10-PCS | Performed by: STUDENT IN AN ORGANIZED HEALTH CARE EDUCATION/TRAINING PROGRAM

## 2022-09-12 PROCEDURE — 85520 HEPARIN ASSAY: CPT | Performed by: STUDENT IN AN ORGANIZED HEALTH CARE EDUCATION/TRAINING PROGRAM

## 2022-09-12 PROCEDURE — 25010000002 MIDAZOLAM PER 1 MG: Performed by: NURSE ANESTHETIST, CERTIFIED REGISTERED

## 2022-09-12 PROCEDURE — 0 LIDOCAINE 1 % SOLUTION: Performed by: NURSE ANESTHETIST, CERTIFIED REGISTERED

## 2022-09-12 PROCEDURE — 93321 DOPPLER ECHO F-UP/LMTD STD: CPT | Performed by: INTERNAL MEDICINE

## 2022-09-12 PROCEDURE — C1757 CATH, THROMBECTOMY/EMBOLECT: HCPCS | Performed by: STUDENT IN AN ORGANIZED HEALTH CARE EDUCATION/TRAINING PROGRAM

## 2022-09-12 PROCEDURE — 25010000002 PROPOFOL 10 MG/ML EMULSION: Performed by: NURSE ANESTHETIST, CERTIFIED REGISTERED

## 2022-09-12 PROCEDURE — 93005 ELECTROCARDIOGRAM TRACING: CPT | Performed by: INTERNAL MEDICINE

## 2022-09-12 PROCEDURE — 25010000002 IOPAMIDOL 61 % SOLUTION: Performed by: STUDENT IN AN ORGANIZED HEALTH CARE EDUCATION/TRAINING PROGRAM

## 2022-09-12 PROCEDURE — B51C1ZA FLUOROSCOPY OF LEFT LOWER EXTREMITY VEINS USING LOW OSMOLAR CONTRAST, GUIDANCE: ICD-10-PCS | Performed by: STUDENT IN AN ORGANIZED HEALTH CARE EDUCATION/TRAINING PROGRAM

## 2022-09-12 PROCEDURE — 93325 DOPPLER ECHO COLOR FLOW MAPG: CPT | Performed by: INTERNAL MEDICINE

## 2022-09-12 PROCEDURE — 84100 ASSAY OF PHOSPHORUS: CPT | Performed by: INTERNAL MEDICINE

## 2022-09-12 PROCEDURE — 25010000002 FENTANYL CITRATE (PF) 50 MCG/ML SOLUTION: Performed by: NURSE ANESTHETIST, CERTIFIED REGISTERED

## 2022-09-12 PROCEDURE — 37238 OPEN/PERQ PLACE STENT SAME: CPT | Performed by: STUDENT IN AN ORGANIZED HEALTH CARE EDUCATION/TRAINING PROGRAM

## 2022-09-12 PROCEDURE — 75820 VEIN X-RAY ARM/LEG: CPT | Performed by: STUDENT IN AN ORGANIZED HEALTH CARE EDUCATION/TRAINING PROGRAM

## 2022-09-12 PROCEDURE — 93321 DOPPLER ECHO F-UP/LMTD STD: CPT

## 2022-09-12 PROCEDURE — 85027 COMPLETE CBC AUTOMATED: CPT | Performed by: INTERNAL MEDICINE

## 2022-09-12 PROCEDURE — 93308 TTE F-UP OR LMTD: CPT

## 2022-09-12 PROCEDURE — 25010000002 HEPARIN (PORCINE) PER 1000 UNITS: Performed by: STUDENT IN AN ORGANIZED HEALTH CARE EDUCATION/TRAINING PROGRAM

## 2022-09-12 PROCEDURE — 25010000002 DEXAMETHASONE PER 1 MG: Performed by: NURSE ANESTHETIST, CERTIFIED REGISTERED

## 2022-09-12 PROCEDURE — 85347 COAGULATION TIME ACTIVATED: CPT

## 2022-09-12 PROCEDURE — 83735 ASSAY OF MAGNESIUM: CPT | Performed by: INTERNAL MEDICINE

## 2022-09-12 PROCEDURE — 25010000002 HEPARIN (PORCINE) PER 1000 UNITS: Performed by: NURSE ANESTHETIST, CERTIFIED REGISTERED

## 2022-09-12 PROCEDURE — 37187 VENOUS MECH THROMBECTOMY: CPT | Performed by: STUDENT IN AN ORGANIZED HEALTH CARE EDUCATION/TRAINING PROGRAM

## 2022-09-12 PROCEDURE — 88304 TISSUE EXAM BY PATHOLOGIST: CPT | Performed by: STUDENT IN AN ORGANIZED HEALTH CARE EDUCATION/TRAINING PROGRAM

## 2022-09-12 PROCEDURE — 75822 VEIN X-RAY ARMS/LEGS: CPT

## 2022-09-12 PROCEDURE — 99232 SBSQ HOSP IP/OBS MODERATE 35: CPT

## 2022-09-12 PROCEDURE — C1876 STENT, NON-COA/NON-COV W/DEL: HCPCS | Performed by: STUDENT IN AN ORGANIZED HEALTH CARE EDUCATION/TRAINING PROGRAM

## 2022-09-12 DEVICE — SELF-EXPANDING STENT
Type: IMPLANTABLE DEVICE | Site: ARTERY ILIAC | Status: FUNCTIONAL
Brand: VENOUS WALLSTENT™

## 2022-09-12 RX ORDER — LIDOCAINE HYDROCHLORIDE 10 MG/ML
0.5 INJECTION, SOLUTION EPIDURAL; INFILTRATION; INTRACAUDAL; PERINEURAL ONCE AS NEEDED
Status: CANCELLED | OUTPATIENT
Start: 2022-09-12

## 2022-09-12 RX ORDER — CEFAZOLIN SODIUM IN 0.9 % NACL 3 G/100 ML
3 INTRAVENOUS SOLUTION, PIGGYBACK (ML) INTRAVENOUS EVERY 8 HOURS
Status: COMPLETED | OUTPATIENT
Start: 2022-09-12 | End: 2022-09-13

## 2022-09-12 RX ORDER — MIDAZOLAM HYDROCHLORIDE 1 MG/ML
1 INJECTION INTRAMUSCULAR; INTRAVENOUS
Status: CANCELLED | OUTPATIENT
Start: 2022-09-12

## 2022-09-12 RX ORDER — LIDOCAINE HYDROCHLORIDE 10 MG/ML
INJECTION, SOLUTION INFILTRATION; PERINEURAL AS NEEDED
Status: DISCONTINUED | OUTPATIENT
Start: 2022-09-12 | End: 2022-09-12 | Stop reason: SURG

## 2022-09-12 RX ORDER — AMOXICILLIN 250 MG
2 CAPSULE ORAL 2 TIMES DAILY
Status: DISCONTINUED | OUTPATIENT
Start: 2022-09-12 | End: 2022-09-14 | Stop reason: HOSPADM

## 2022-09-12 RX ORDER — SODIUM CHLORIDE, SODIUM LACTATE, POTASSIUM CHLORIDE, CALCIUM CHLORIDE 600; 310; 30; 20 MG/100ML; MG/100ML; MG/100ML; MG/100ML
INJECTION, SOLUTION INTRAVENOUS CONTINUOUS PRN
Status: DISCONTINUED | OUTPATIENT
Start: 2022-09-12 | End: 2022-09-12 | Stop reason: SURG

## 2022-09-12 RX ORDER — ONDANSETRON 2 MG/ML
INJECTION INTRAMUSCULAR; INTRAVENOUS AS NEEDED
Status: DISCONTINUED | OUTPATIENT
Start: 2022-09-12 | End: 2022-09-12 | Stop reason: SURG

## 2022-09-12 RX ORDER — PROPOFOL 10 MG/ML
VIAL (ML) INTRAVENOUS AS NEEDED
Status: DISCONTINUED | OUTPATIENT
Start: 2022-09-12 | End: 2022-09-12 | Stop reason: SURG

## 2022-09-12 RX ORDER — MIDAZOLAM HYDROCHLORIDE 1 MG/ML
INJECTION INTRAMUSCULAR; INTRAVENOUS AS NEEDED
Status: DISCONTINUED | OUTPATIENT
Start: 2022-09-12 | End: 2022-09-12 | Stop reason: SURG

## 2022-09-12 RX ORDER — SODIUM CHLORIDE 0.9 % (FLUSH) 0.9 %
10 SYRINGE (ML) INJECTION AS NEEDED
Status: CANCELLED | OUTPATIENT
Start: 2022-09-12

## 2022-09-12 RX ORDER — HEPARIN SODIUM 1000 [USP'U]/ML
INJECTION, SOLUTION INTRAVENOUS; SUBCUTANEOUS AS NEEDED
Status: DISCONTINUED | OUTPATIENT
Start: 2022-09-12 | End: 2022-09-12 | Stop reason: SURG

## 2022-09-12 RX ORDER — ROCURONIUM BROMIDE 10 MG/ML
INJECTION, SOLUTION INTRAVENOUS AS NEEDED
Status: DISCONTINUED | OUTPATIENT
Start: 2022-09-12 | End: 2022-09-12 | Stop reason: SURG

## 2022-09-12 RX ORDER — SODIUM CHLORIDE 0.9 % (FLUSH) 0.9 %
10 SYRINGE (ML) INJECTION EVERY 12 HOURS SCHEDULED
Status: CANCELLED | OUTPATIENT
Start: 2022-09-12

## 2022-09-12 RX ORDER — ONDANSETRON 2 MG/ML
4 INJECTION INTRAMUSCULAR; INTRAVENOUS EVERY 6 HOURS PRN
Status: DISCONTINUED | OUTPATIENT
Start: 2022-09-12 | End: 2022-09-14 | Stop reason: HOSPADM

## 2022-09-12 RX ORDER — DEXAMETHASONE SODIUM PHOSPHATE 4 MG/ML
INJECTION, SOLUTION INTRA-ARTICULAR; INTRALESIONAL; INTRAMUSCULAR; INTRAVENOUS; SOFT TISSUE AS NEEDED
Status: DISCONTINUED | OUTPATIENT
Start: 2022-09-12 | End: 2022-09-12 | Stop reason: SURG

## 2022-09-12 RX ORDER — POLYETHYLENE GLYCOL 3350 17 G/17G
17 POWDER, FOR SOLUTION ORAL DAILY PRN
Status: DISCONTINUED | OUTPATIENT
Start: 2022-09-12 | End: 2022-09-14 | Stop reason: HOSPADM

## 2022-09-12 RX ORDER — FAMOTIDINE 20 MG/1
20 TABLET, FILM COATED ORAL ONCE
Status: CANCELLED | OUTPATIENT
Start: 2022-09-12 | End: 2022-09-12

## 2022-09-12 RX ORDER — SODIUM CHLORIDE 450 MG/100ML
50 INJECTION, SOLUTION INTRAVENOUS CONTINUOUS
Status: DISCONTINUED | OUTPATIENT
Start: 2022-09-12 | End: 2022-09-13

## 2022-09-12 RX ORDER — ASPIRIN 81 MG/1
81 TABLET ORAL DAILY
Status: DISCONTINUED | OUTPATIENT
Start: 2022-09-13 | End: 2022-09-14 | Stop reason: HOSPADM

## 2022-09-12 RX ORDER — BISACODYL 10 MG
10 SUPPOSITORY, RECTAL RECTAL DAILY PRN
Status: DISCONTINUED | OUTPATIENT
Start: 2022-09-12 | End: 2022-09-14 | Stop reason: HOSPADM

## 2022-09-12 RX ORDER — BISACODYL 5 MG/1
5 TABLET, DELAYED RELEASE ORAL DAILY PRN
Status: DISCONTINUED | OUTPATIENT
Start: 2022-09-12 | End: 2022-09-14 | Stop reason: HOSPADM

## 2022-09-12 RX ORDER — ONDANSETRON 4 MG/1
4 TABLET, FILM COATED ORAL EVERY 6 HOURS PRN
Status: DISCONTINUED | OUTPATIENT
Start: 2022-09-12 | End: 2022-09-14 | Stop reason: HOSPADM

## 2022-09-12 RX ORDER — SODIUM CHLORIDE, SODIUM LACTATE, POTASSIUM CHLORIDE, CALCIUM CHLORIDE 600; 310; 30; 20 MG/100ML; MG/100ML; MG/100ML; MG/100ML
9 INJECTION, SOLUTION INTRAVENOUS CONTINUOUS
Status: CANCELLED | OUTPATIENT
Start: 2022-09-12

## 2022-09-12 RX ORDER — FAMOTIDINE 10 MG/ML
20 INJECTION, SOLUTION INTRAVENOUS ONCE
Status: CANCELLED | OUTPATIENT
Start: 2022-09-12 | End: 2022-09-12

## 2022-09-12 RX ORDER — FENTANYL CITRATE 50 UG/ML
INJECTION, SOLUTION INTRAMUSCULAR; INTRAVENOUS AS NEEDED
Status: DISCONTINUED | OUTPATIENT
Start: 2022-09-12 | End: 2022-09-12 | Stop reason: SURG

## 2022-09-12 RX ADMIN — SODIUM CHLORIDE, POTASSIUM CHLORIDE, SODIUM LACTATE AND CALCIUM CHLORIDE: 600; 310; 30; 20 INJECTION, SOLUTION INTRAVENOUS at 07:30

## 2022-09-12 RX ADMIN — LIDOCAINE HYDROCHLORIDE 100 MG: 10 INJECTION, SOLUTION INFILTRATION; PERINEURAL at 07:38

## 2022-09-12 RX ADMIN — SODIUM CHLORIDE 50 ML/HR: 4.5 INJECTION, SOLUTION INTRAVENOUS at 10:20

## 2022-09-12 RX ADMIN — DEXAMETHASONE SODIUM PHOSPHATE 8 MG: 4 INJECTION, SOLUTION INTRA-ARTICULAR; INTRALESIONAL; INTRAMUSCULAR; INTRAVENOUS; SOFT TISSUE at 07:47

## 2022-09-12 RX ADMIN — HYDROCODONE BITARTRATE AND ACETAMINOPHEN 2 TABLET: 5; 325 TABLET ORAL at 05:17

## 2022-09-12 RX ADMIN — ONDANSETRON 4 MG: 2 INJECTION INTRAMUSCULAR; INTRAVENOUS at 09:22

## 2022-09-12 RX ADMIN — FLUOXETINE 20 MG: 20 CAPSULE ORAL at 10:34

## 2022-09-12 RX ADMIN — SODIUM CHLORIDE, POTASSIUM CHLORIDE, SODIUM LACTATE AND CALCIUM CHLORIDE: 600; 310; 30; 20 INJECTION, SOLUTION INTRAVENOUS at 09:24

## 2022-09-12 RX ADMIN — LORAZEPAM 0.5 MG: 0.5 TABLET ORAL at 20:57

## 2022-09-12 RX ADMIN — ROCURONIUM BROMIDE 50 MG: 50 INJECTION, SOLUTION INTRAVENOUS at 07:38

## 2022-09-12 RX ADMIN — FENTANYL CITRATE 100 MCG: 50 INJECTION, SOLUTION INTRAMUSCULAR; INTRAVENOUS at 08:19

## 2022-09-12 RX ADMIN — HEPARIN SODIUM 5000 UNITS: 1000 INJECTION, SOLUTION INTRAVENOUS; SUBCUTANEOUS at 08:47

## 2022-09-12 RX ADMIN — HYDROCODONE BITARTRATE AND ACETAMINOPHEN 2 TABLET: 5; 325 TABLET ORAL at 15:01

## 2022-09-12 RX ADMIN — PROPOFOL 200 MG: 10 INJECTION, EMULSION INTRAVENOUS at 07:38

## 2022-09-12 RX ADMIN — PANTOPRAZOLE SODIUM 40 MG: 40 INJECTION, POWDER, FOR SOLUTION INTRAVENOUS at 05:17

## 2022-09-12 RX ADMIN — CEFAZOLIN 3 G: 10 INJECTION, POWDER, FOR SOLUTION INTRAVENOUS at 16:29

## 2022-09-12 RX ADMIN — ROCURONIUM BROMIDE 20 MG: 50 INJECTION, SOLUTION INTRAVENOUS at 08:59

## 2022-09-12 RX ADMIN — ROCURONIUM BROMIDE 30 MG: 50 INJECTION, SOLUTION INTRAVENOUS at 08:13

## 2022-09-12 RX ADMIN — SUGAMMADEX 200 MG: 100 INJECTION, SOLUTION INTRAVENOUS at 09:27

## 2022-09-12 RX ADMIN — HEPARIN SODIUM 14 UNITS/KG/HR: 10000 INJECTION, SOLUTION INTRAVENOUS at 07:21

## 2022-09-12 RX ADMIN — FENTANYL CITRATE 100 MCG: 50 INJECTION, SOLUTION INTRAMUSCULAR; INTRAVENOUS at 07:38

## 2022-09-12 RX ADMIN — HYDROCODONE BITARTRATE AND ACETAMINOPHEN 2 TABLET: 5; 325 TABLET ORAL at 20:57

## 2022-09-12 RX ADMIN — FENTANYL CITRATE 100 MCG: 50 INJECTION, SOLUTION INTRAMUSCULAR; INTRAVENOUS at 09:45

## 2022-09-12 RX ADMIN — SENNOSIDES AND DOCUSATE SODIUM 2 TABLET: 50; 8.6 TABLET ORAL at 20:57

## 2022-09-12 RX ADMIN — MIDAZOLAM HYDROCHLORIDE 2 MG: 1 INJECTION, SOLUTION INTRAMUSCULAR; INTRAVENOUS at 07:32

## 2022-09-12 RX ADMIN — CEFAZOLIN SODIUM 2 G: 2 INJECTION, SOLUTION INTRAVENOUS at 07:31

## 2022-09-12 RX ADMIN — Medication 10 ML: at 21:09

## 2022-09-12 RX ADMIN — LORAZEPAM 0.5 MG: 0.5 TABLET ORAL at 15:01

## 2022-09-12 RX ADMIN — HYDROCODONE BITARTRATE AND ACETAMINOPHEN 2 TABLET: 5; 325 TABLET ORAL at 10:34

## 2022-09-12 RX ADMIN — HEPARIN SODIUM 5000 UNITS: 1000 INJECTION, SOLUTION INTRAVENOUS; SUBCUTANEOUS at 08:17

## 2022-09-12 RX ADMIN — HEPARIN SODIUM 5000 UNITS: 1000 INJECTION, SOLUTION INTRAVENOUS; SUBCUTANEOUS at 08:37

## 2022-09-12 RX ADMIN — LORAZEPAM 0.5 MG: 0.5 TABLET ORAL at 01:19

## 2022-09-12 NOTE — CASE MANAGEMENT/SOCIAL WORK
Discharge Planning Assessment  [facility]     Patient Name: Urszula Gonzalez  MRN: [id number]  Today's Date: 9/12/2022    Admit Date: 9/9/2022     Discharge Needs Assessment     Row Name 09/12/22 1541       Living Environment    People in Home spouse;child(rachael), dependent    Current Living Arrangements home    Primary Care Provided by self    Provides Primary Care For child(rachael)    Family Caregiver if Needed spouse;parent(s)    Able to Return to Prior Arrangements yes       Transition Planning    Patient/Family Anticipates Transition to home       Discharge Needs Assessment    Readmission Within the Last 30 Days no previous admission in last 30 days    Equipment Currently Used at Home none               Discharge Plan     Row Name 09/12/22 1542       Plan    Plan Home    Patient/Family in Agreement with Plan yes    Plan Comments I spoke with Mrs. Gonzalez' mother, Briana, on the phone today. Briana tells me that Mrs. Gonzalez lives with her , Gurvinder, and their dependent children in [facility]. She is independent with mobility and activities of daily living. Mrs. Gonzalez drives herself when leaving the home and is not current with any home or outpatient services. She has no medical equipment at home and denies any difficulty with obtaining or affording her medications. Mrs. Gonzalez anticipates going home with her  at the time of discharge and her mother or  will transport her at that time. No discharge needs identified at this time. Case management will continue to follow.    Final Discharge Disposition Code 01 - home or self-care              Continued Care and Services - Admitted Since 9/9/2022    Coordination has not been started for this encounter.       Expected Discharge Date and Time     Expected Discharge Date Expected Discharge Time    Sep 16, 2022          Demographic Summary     Row Name 09/12/22 1540       General Information    General Information Comments Confirmed  PCP to be Cha Fernández and [facility] Medicaid to be insurer.               Functional Status     Row Name 09/12/22 1541       Functional Status, IADL    Medications independent    Meal Preparation independent    Housekeeping independent    Laundry independent    Shopping independent       Employment/    Employment Status unemployed               Psychosocial    No documentation.                Abuse/Neglect    No documentation.                Legal    No documentation.                Substance Abuse    No documentation.                Patient Forms    No documentation.                   Alfa Steele RN

## 2022-09-12 NOTE — BRIEF OP NOTE
Brief operative note    Urszula Gonzalez  9/12/2022    Preoperative diagnosis:   Bilateral pulmonary emboli  COVID-19 infection  Left iliofemoral DVT  IVC DVT  Left lower extremity engorgement, pain       Postoperative Diagnosis:  Same    Procedure/CPT® Codes:        Procedure(s):  1. Left common femoral vein ultrasound-guided needle access   2. Right common femoral vein ultrasound-guided needle access   3. Left common iliac vein mechanical thrombectomy percutaneous with extirpation of matter  4. IVC mechanical thrombectomy percutaneous with extirpation of matter  5.  Venography of IVC, left common iliac vein, left external iliac vein, right common iliac vein, right external iliac vein  6.  Placement of 20 mm x 40 mm Wallstent at left common iliac vein  7.  Supervision and interpretation of radiography fluoroscopy    Surgeon(s):  Cooper Zhao MD Dr. John Chaney Dr. Chaney and Dr. Zhao should be considered to be co-surgeon's for this procedure    Anesthesia: General    Staff:   Circulator: Riya Godwin RN  Radiology Technologist: Jac Peralta  Scrub Person: Toby Watson  Nursing Assistant: Linda Calabrese  Assistant: Huong Ray PA-C  Assistant: Huong Ray PA-C      Estimated Blood Loss: minimal    Urine Voided: * No values recorded between 9/12/2022  7:29 AM and 9/12/2022  9:44 AM *    Specimens:                Specimens     ID Source Type Tests Collected By Collected At Frozen?    A Iliac, Left Tissue · TISSUE PATHOLOGY EXAM   Cooper Zhao MD 9/12/22 0961     Description: LEFT ILIAC THROMBUS    This specimen was not marked as sent.                Drains:   Urethral Catheter Silicone 16 Fr. (Active)   Daily Indications Hourly Output in Critical Unstable Patient requiring Frequent Intervention (hemodynamics, titration or life supportive therapy) 09/12/22 0600   Site Assessment Clean;Skin intact 09/12/22 0600   Collection Container Standard drainage bag 09/12/22 0600    Securement Method Securing device 09/12/22 0600   Catheter care complete Yes 09/11/22 2000   Output (mL) 150 mL 09/12/22 0720       Findings: Excellent technical result        Complications: None    Assistant: Huong Ray PA-C  was responsible for performing the following activities: Retraction, Suction, Irrigation, Suturing, Closing and Placing Dressing and their skilled assistance was necessary for the success of this case.    Cooper Zhao MD     Date: 9/12/2022  Time: 09:48 EDT

## 2022-09-12 NOTE — PROGRESS NOTES
"  [facility]  PROGRESS NOTE    Date of Admission: 9/9/2022  Date of Service: 09/12/22    Primary Care Physician: Cha Fernández APRN    Chief Complaint: PE and DVT    Subjective      Patient seen after venogram/thrombectomy.  Reports her leg is feeling better.  Feels like her shortness of breath is improved some.  Is complaining of back pain but believes that is due to bedrest.      Objective   Vitals: /73   Pulse 70   Temp 98 °F (36.7 °C) (Axillary)   Resp 17   Ht 167.6 cm (65.98\")   Wt 122 kg (268 lb 15.4 oz)   LMP 09/08/2022 (Approximate)   SpO2 93%   Breastfeeding No   BMI 43.43 kg/m²     Physical Exam:  GENERAL:  in no acute distress.   HEENT:no jugular venous distention  HEART: Regular rhythm, normal rate, and no murmurs, gallops, or rubs.   LUNGS: Clear to auscultation bilaterally. No wheezing, rales or rhonchi.  EXTREMITIES: No significant edema noted.     Results:  Results from last 7 days   Lab Units 09/12/22  0449 09/11/22  0349 09/10/22  1621   WBC 10*3/mm3 11.92* 13.80* 16.03*   HEMOGLOBIN g/dL 8.9* 9.0* 10.0*   HEMATOCRIT % 29.4* 28.7* 32.1*   PLATELETS 10*3/mm3 252 230 237     Results from last 7 days   Lab Units 09/12/22  0449 09/11/22  0349 09/10/22  0032   SODIUM mmol/L 142 142 142   POTASSIUM mmol/L 4.1 4.0 4.3   CHLORIDE mmol/L 110* 109* 108*   CO2 mmol/L 24.0 22.0 23.0   BUN mg/dL 10 12 8   CREATININE mg/dL 0.69 0.74 0.77   GLUCOSE mg/dL 105* 140* 145*      Lab Results   Component Value Date    AST 11 04/21/2021    ALT 6 04/21/2021                     Results from last 7 days   Lab Units 09/10/22  1621 09/10/22  0643 09/10/22  0032 09/09/22  1745 09/09/22  1228   PROTIME Seconds  --   --   --   --  17.2*   INR   --   --   --   --  1.41*   APTT seconds 75.4 109.1* 49.3*   < > 53.3*    < > = values in this interval not displayed.                 Intake/Output Summary (Last 24 hours) at 9/12/2022 9151  Last data filed at 9/12/2022 " 1400  Gross per 24 hour   Intake 1983 ml   Output 3600 ml   Net -1617 ml       I personally reviewed the patient's EKG/Telemetry data and new clinical data    Current Medications:  [START ON 9/13/2022] aspirin, 81 mg, Oral, Daily  ceFAZolin, 3 g, Intravenous, Q8H  FLUoxetine, 20 mg, Oral, Daily  pantoprazole, 40 mg, Intravenous, Q AM  senna-docusate sodium, 2 tablet, Oral, BID  sodium chloride, 10 mL, Intravenous, Q12H      heparin, 14 Units/kg/hr, Last Rate: 14 Units/kg/hr (09/12/22 0728)  Pharmacy to Dose Heparin,   sodium chloride, 50 mL/hr, Last Rate: 50 mL/hr (09/12/22 1020)        Assessment:  1. Acute pulmonary embolism  1. Echo 9/9/2022: LVEF 62%.  Normal RV size and normal RVSP  2. DVT associated with COVID-19  1. CT abdomen/pelvis 9/3/2022 [facility]: Acute DVT of the left common femoral vein, left external iliac vein, left common iliac vein extending into the infrarenal IVC and almost to level of renal veins  2. Localized thrombolysis catheter placed 9/9/2022  3. Attempted thrombectomy using CAT aspiration catheter with residual large thrombus burden, 9/10/2022  4. Left iliac thrombectomy and placement of stent, IVC mechanical thrombectomy, 9/12/2022  5. Infrarenal IVC filter placed, 9/12/2022  Plan:  1. We will obtain limited echocardiogram to reassess for RV dysfunction/remeasure RV/LV ratio in the setting of PE.   2. Obtain Troponin and ProBNP tomorrow morning.  3. Continue heparin drip for anticoagulation.   4. Postoperative management per ICU/CT surgery.     Delaney Rapp PA-C    I have seen and examined the patient, case was discussed with the physician extender, reviewed the above note, necessary changes were made and I agree with the final note.   Luis Kline MD, [facility], [facility]

## 2022-09-12 NOTE — PAYOR COMM NOTE
"Id # [id number]  Amirah Mendoza RN, BSN, CMGT-BC  Phone # [phone]  Fax # [phone]  Urszula Blackburn (32 y.o. Female)             Date of Birth   1990    Social Security Number       Address   [address] [place] [id number]    Home Phone   [phone]    MRN   [id number]       Restorationist   None    Marital Status                               Admission Date   9/9/22    Admission Type   Urgent    Admitting Provider   Cooper Zhao MD    Attending Provider   Cooper Zhao MD    Department, Room/Bed   [facility]       Discharge Date       Discharge Disposition       Discharge Destination                               Attending Provider: Cooper Zhao MD    Allergies: Ciprofloxacin    Isolation: None   Infection: None   Code Status: CPR   Advance Care Planning Activity    Ht: 165.1 cm (65\")   Wt: 122 kg (268 lb)    Admission Cmt: None   Principal Problem: Deep vein thrombosis (DVT) associated with COVID-19 [U07.1,I82.90] More...                 Active Insurance as of 9/9/2022     Primary Coverage     Payor Plan Insurance Group Employer/Plan Group    [employer] [place] [employer] [place]ICA[place]      Payor Plan Address Payor Plan Phone Number Payor Plan Fax Number Effective Dates    [address] [address] [phone]  9/25/2020 - None Entered    [facility] [id number]       Subscriber Name Subscriber Birth Date Member ID       URSZULA BLACKBURN 1990 [id number]                    History & Physical      BeauTerrence shahid MD at 09/09/22 [facility]          [facility] Consult/H&P Note      Referring Provider: Rafiq Allen MD  Primary Provider:  Cha Fernández APRN  Reason for Consultation: DVT/PE    PROBLEM LIST:  1. DVT/PE  a. Diagnosed with bilateral PE, LLE DVT, 9/2022  b. CT Abd/pelvis, 9/3/22 [facility]: acute DVT within the left common femoral vein, left external iliac vein, left common iliac vein extending into the " infrarenal IVC and almost to the level of renal veins.  c. Echo 22: pending  2. POTS during second pregnancy  3. Orthostatic tachycardia and orthostatic hypotension  4. Hypertension  5. Gestational hypertension  6. GERD  7. Ovarian cyst  8. Depression  9. Surgical Hx:   a. Tonsillectomy  b. Cholecystectomy  c.  x 3  d. Tubal ligation  e. Gastric sleeve      HISTORY OF PRESENT ILLNESS:  Urszula Gonzalez is a 32 y.o. female with the above noted pmhx who presented to [facility] 9/3/2022 for worsening left lower extremity pain. Of note, she had been recently diagnosed with bilateral PE and left lower extremity DVT. She was treated with heparin gtt to Eliquis. She improved and was subsequently discharged home. We have no documentation of that admission. She was only home for a few hours before her left leg pain significantly worsened and prompted her return to the ED. At OSH, she had CT A/P as noted above which revealed an extensive left lower extremity DVT extending from left common femoral vein to almost her renal veins. She was subsequently re-admitted and started on Coumadin given failure of Eliquis. Today she has been transferred to [facility] for consideration of possible intervention to LLE.       REVIEW OF SYSTEMS  Pertinent positives are listed in the HPI and all other ROS are negative.      SOCIAL HISTORY:   reports that she has never smoked. She has never used smokeless tobacco. She reports previous alcohol use. She reports that she does not use drugs.     FAMILY HISTORY:  family history includes Cancer in her maternal grandmother and paternal grandfather; Cirrhosis in her maternal grandmother; Diabetes in her maternal grandmother; Graves' disease in her mother; Hypertension in her brother, brother, and father; Myasthenia gravis in her mother; No Known Problems in her daughter, paternal grandmother, sister, and son; Thyroid disease in her mother.     CURRENT MEDICATIONS:      Current  Facility-Administered Medications:   •  heparin (porcine) injection 2,000 Units, 2,000 Units, Intravenous, PRN, Terrence Green MD  •  heparin (porcine) injection 4,000 Units, 4,000 Units, Intravenous, PRN, Terrence Green MD  •  heparin 29504 units/250 mL (100 units/mL) in 0.45 % NaCl infusion, 18 Units/kg/hr, Intravenous, Titrated, Terrence Green MD  •  Pharmacy to Dose Heparin, , Does not apply, Continuous PRN, Terrence Green MD  •  sodium chloride 0.9 % flush 10 mL, 10 mL, Intravenous, Q12H, Kuns-Oneil, Sumi B, APRN  •  sodium chloride 0.9 % flush 10 mL, 10 mL, Intravenous, PRN, Kuns-Oneil, Sumi B, APRN     Allergies:  Ciprofloxacin    Objective     Vital Sign Min/Max for last 24 hours  Temp  Min: 97.2 °F (36.2 °C)  Max: 97.2 °F (36.2 °C)   BP  Min: 128/74  Max: 150/105   Pulse  Min: 90  Max: 90   Resp  Min: 18  Max: 18   SpO2  Min: 96 %  Max: 97 %   No data recorded   Weight  Min: 122 kg (269 lb)  Max: 122 kg (269 lb)         PHYSICAL EXAM:  GENERAL: This is a well-developed, well-nourished, female who is in no acute distress.   SKIN: Pink and warm without rash or abnormality noted.   HEENT: Head is normocephalic and atraumatic.   LUNGS: Clear to auscultation bilaterally without wheezing, rhonchi, or rales noted.   CARDIOVASCULAR: The heart has a regular rate with a normal S1 and S2. There is no murmur, gallop, rub, or click appreciated. The PMI is nondisplaced. Carotid upstrokes are 2+ and symmetrical without bruits.   ABDOMEN: Soft and nondistended with positive bowel sounds x4. The patient denies tenderness of palpitation.   PERIPHERAL VASCULAR:  Posterior tibial and dorsalis pedis pulses are 1+ and symmetrical. There is 2+ peripheral edema to LLE.   PSYCH: Normal mood and affect.      EKG:    Tele: NSR    LABS:      Lab Results   Component Value Date    WBC 15.28 (H) 04/22/2021    HGB 8.9 (L) 04/23/2021    HCT 30.1 (L) 04/23/2021    MCV 79.9 04/22/2021      04/22/2021     Lab Results   Component Value Date    GLUCOSE 120 (H) 03/10/2021    BUN 7 03/10/2021    CREATININE 0.60 04/21/2021    EGFRIFNONA 131 03/10/2021    BCR 12.7 03/10/2021    K 3.4 (L) 03/10/2021    CO2 18.0 (L) 03/10/2021    CALCIUM 9.0 03/10/2021    ALBUMIN 3.50 03/10/2021    AST 11 04/21/2021    ALT 6 04/21/2021     Lab Results   Component Value Date    TROPONINT <0.010 03/10/2021     No results found for: INR, PROTIME  No results found for: CHOL, CHLPL, TRIG, HDL, LDL, LDLDIRECT     Results Review: I reviewed the patient's new clinical results.      Echo:  · Left ventricular ejection fraction appears to be 61 - 65%.  · Normal right ventricular cavity size, wall thickness, systolic function and septal motion noted.  · The tricuspid valve is structurally normal with no significant regurgitation present. Insufficient TR velocity profile to estimate the right ventricular systolic pressure.        ASSESSMENT:    1.  DVT/PE  a. Diagnosed with bilateral PE, LLE DVT, 9/2022  b. CT Abd/pelvis, 9/3/22 [facility]: acute DVT within the left common femoral vein, left external iliac vein, left common iliac vein extending into the infrarenal IVC and almost to the level of renal veins.  2. POTS during second pregnancy  3. Orthostatic tachycardia and orthostatic hypotension  4. Hypertension  5. COVID infection early August      PLAN:    1. CTA of the chest abdomen pelvis have been uploaded to PACS for review  2. Echocardiogram: EF 61 to 65%, RV strain appears resolved  3. Continue heparin gtt, pharmacy to dose.   4. I have consulted Dr. Zhao in cardiothoracic and vascular surgery for evaluation for thrombectomy versus lysis for the patient's left iliofemoral DVT.  He has evaluated the patient and agrees that she is at elevated risk for post thrombotic complications.  She will be taken to the OR today for iliofemoral and caval DVT lysis with staged mechanical thrombectomy.      I discussed the patient's findings and my  recommendations with patient.    Scribed for Terrence Green MD by KERVIN Oh. 9/9/2022  12:44 EDT       I, Terrence Green M.D.,  have reviewed the notes, assessments, and/or procedures performed by KERVIN/PA, I CONCUR with the documentation of Urszula Gonzalez.          Electronically signed by Terrence Green MD at 09/09/22 1609       Emergency Department Notes    No notes of this type exist for this encounter.           Current Facility-Administered Medications   Medication Dose Route Frequency Provider Last Rate Last Admin   • [START ON 9/13/2022] aspirin EC tablet 81 mg  81 mg Oral Daily Huong Ray PA-C       • sennosides-docusate (PERICOLACE) 8.6-50 MG per tablet 2 tablet  2 tablet Oral BID Cooper Zhao MD        And   • polyethylene glycol (MIRALAX) packet 17 g  17 g Oral Daily PRN Cooper Zhao MD        And   • bisacodyl (DULCOLAX) EC tablet 5 mg  5 mg Oral Daily PRN Cooper Zhao MD        And   • bisacodyl (DULCOLAX) suppository 10 mg  10 mg Rectal Daily PRN Cooper Zhao MD       • calcium carbonate (TUMS) chewable tablet 500 mg (200 mg elemental)  2 tablet Oral TID PRN Huong Ray PA-C       • ceFAZolin in Sodium Chloride (ANCEF) IVPB solution 3 g  3 g Intravenous Q8H Huong Ray PA-C       • FLUoxetine (PROzac) capsule 20 mg  20 mg Oral Daily Huong Ray PA-C   20 mg at 09/12/22 1034   • heparin 66505 units/250 mL (100 units/mL) in 0.45 % NaCl infusion  14 Units/kg/hr Intravenous Titrated Huong Ray PA-C 17.08 mL/hr at 09/12/22 0728 14 Units/kg/hr at 09/12/22 0728   • HYDROcodone-acetaminophen (NORCO) 5-325 MG per tablet 2 tablet  2 tablet Oral Q4H PRN Huong Ray PA-C   2 tablet at 09/12/22 1501   • LORazepam (ATIVAN) tablet 0.5 mg  0.5 mg Oral Q6H PRN Huong Ray PA-C   0.5 mg at 09/12/22 1501   • Magnesium Sulfate 2 gram Bolus, followed by 8 gram infusion (total Mg dose 10 grams)- Mg less than or equal  to 1mg/dL  2 g Intravenous PRN Huong Ray PA-C        Or   • Magnesium Sulfate 2 gram / 50mL Infusion (GIVE X 3 BAGS TO EQUAL 6GM TOTAL DOSE) - Mg 1.1 - 1.5 mg/dl  2 g Intravenous PRN OrangePhillyHuong PA-C        Or   • Magnesium Sulfate 4 gram infusion- Mg 1.6-1.9 mg/dL  4 g Intravenous PRN Huong Ray PA-C 25 mL/hr at 09/11/22 0647 4 g at 09/11/22 0647   • ondansetron (ZOFRAN) tablet 4 mg  4 mg Oral Q6H PRN Huong Ray PA-C        Or   • ondansetron (ZOFRAN) injection 4 mg  4 mg Intravenous Q6H PRN Huong Ray PA-C       • pantoprazole (PROTONIX) injection 40 mg  40 mg Intravenous Q AM Huong Ray PA-C   40 mg at 09/12/22 0517   • Pharmacy to Dose Heparin   Does not apply Continuous PRN Huong Ray PA-C       • phenol (CHLORASEPTIC) 1.4 % liquid 1 spray  1 spray Mouth/Throat Q2H PRN Cooper Zhao MD       • sodium chloride 0.45 % infusion  50 mL/hr Intravenous Continuous Huong Ray PA-C 50 mL/hr at 09/12/22 1020 50 mL/hr at 09/12/22 1020   • sodium chloride 0.9 % flush 10 mL  10 mL Intravenous Q12H Huong Ray PA-C   10 mL at 09/11/22 2133     Lab Results (last 72 hours)     Procedure Component Value Units Date/Time    Heparin Anti-Xa [[id number]]  (Abnormal) Collected: 09/12/22 1208    Specimen: Blood Updated: 09/12/22 1244     Heparin Anti-Xa (UFH) >1.10 IU/ml     Tissue Pathology Exam [[id number]] Collected: 09/12/22 0935    Specimen: Tissue from Iliac, Left Updated: 09/12/22 1108    Tissue Pathology Exam [[id number]] Collected: 09/10/22 1200    Specimen: Tissue from Femoral Artery Updated: 09/12/22 0655    Phosphorus [[id number]]  (Normal) Collected: 09/12/22 0449    Specimen: Blood Updated: 09/12/22 0555     Phosphorus 4.4 mg/dL     Basic Metabolic Panel [[id number]]  (Abnormal) Collected: 09/12/22 0449    Specimen: Blood Updated: 09/12/22 0554     Glucose 105 mg/dL      BUN 10 mg/dL      Creatinine 0.69 mg/dL      Sodium 142 mmol/L       Potassium 4.1 mmol/L      Chloride 110 mmol/L      CO2 24.0 mmol/L      Calcium 8.0 mg/dL      BUN/Creatinine Ratio 14.5     Anion Gap 8.0 mmol/L      eGFR 119.2 mL/min/1.73      Comment: National Kidney Foundation and American Society of Nephrology (ASN) Task Force recommended calculation based on the Chronic Kidney Disease Epidemiology Collaboration (CKD-EPI) equation refit without adjustment for race.       Narrative:      GFR Normal >60  Chronic Kidney Disease <60  Kidney Failure <15      Magnesium [[id number]]  (Normal) Collected: 09/12/22 0449    Specimen: Blood Updated: 09/12/22 0553     Magnesium 2.1 mg/dL     Heparin Anti-Xa [[id number]]  (Normal) Collected: 09/12/22 0449    Specimen: Blood Updated: 09/12/22 0530     Heparin Anti-Xa (UFH) 0.43 IU/ml     CBC (No Diff) [[id number]]  (Abnormal) Collected: 09/12/22 0449    Specimen: Blood Updated: 09/12/22 0514     WBC 11.92 10*3/mm3      RBC 3.54 10*6/mm3      Hemoglobin 8.9 g/dL      Hematocrit 29.4 %      MCV 83.1 fL      MCH 25.1 pg      MCHC 30.3 g/dL      RDW 16.7 %      RDW-SD 49.8 fl      MPV 9.9 fL      Platelets 252 10*3/mm3     Heparin Anti-Xa [[id number]]  (Normal) Collected: 09/11/22 1145    Specimen: Blood Updated: 09/11/22 1214     Heparin Anti-Xa (UFH) 0.40 IU/ml     Basic Metabolic Panel [[id number]]  (Abnormal) Collected: 09/11/22 0349    Specimen: Blood Updated: 09/11/22 0601     Glucose 140 mg/dL      BUN 12 mg/dL      Creatinine 0.74 mg/dL      Sodium 142 mmol/L      Potassium 4.0 mmol/L      Comment: Slight hemolysis detected by analyzer. Results may be affected.        Chloride 109 mmol/L      CO2 22.0 mmol/L      Calcium 8.0 mg/dL      BUN/Creatinine Ratio 16.2     Anion Gap 11.0 mmol/L      eGFR 111.1 mL/min/1.73      Comment: National Kidney Foundation and American Society of Nephrology (ASN) Task Force recommended calculation based on the Chronic Kidney Disease Epidemiology Collaboration (CKD-EPI) equation refit without adjustment for  race.       Narrative:      GFR Normal >60  Chronic Kidney Disease <60  Kidney Failure <15      Magnesium [[id number]]  (Normal) Collected: 09/11/22 0349    Specimen: Blood Updated: 09/11/22 0559     Magnesium 1.9 mg/dL     Phosphorus [[id number]]  (Normal) Collected: 09/11/22 0349    Specimen: Blood Updated: 09/11/22 0559     Phosphorus 3.3 mg/dL     Heparin Anti-Xa [[id number]]  (Normal) Collected: 09/11/22 0349    Specimen: Blood Updated: 09/11/22 0437     Heparin Anti-Xa (UFH) 0.31 IU/ml     CBC & Differential [[id number]]  (Abnormal) Collected: 09/11/22 0349    Specimen: Blood Updated: 09/11/22 0403    Narrative:      The following orders were created for panel order CBC & Differential.  Procedure                               Abnormality         Status                     ---------                               -----------         ------                     CBC Auto Differential[[id number]]        Abnormal            Final result                 Please view results for these tests on the individual orders.    CBC Auto Differential [[id number]]  (Abnormal) Collected: 09/11/22 0349    Specimen: Blood Updated: 09/11/22 0403     WBC 13.80 10*3/mm3      RBC 3.54 10*6/mm3      Hemoglobin 9.0 g/dL      Hematocrit 28.7 %      MCV 81.1 fL      MCH 25.4 pg      MCHC 31.4 g/dL      RDW 16.3 %      RDW-SD 48.3 fl      MPV 9.5 fL      Platelets 230 10*3/mm3      Neutrophil % 57.9 %      Lymphocyte % 29.9 %      Monocyte % 9.5 %      Eosinophil % 0.4 %      Basophil % 0.4 %      Immature Grans % 1.9 %      Neutrophils, Absolute 7.98 10*3/mm3      Lymphocytes, Absolute 4.13 10*3/mm3      Monocytes, Absolute 1.31 10*3/mm3      Eosinophils, Absolute 0.06 10*3/mm3      Basophils, Absolute 0.06 10*3/mm3      Immature Grans, Absolute 0.26 10*3/mm3      nRBC 1.2 /100 WBC     Heparin Anti-Xa [[id number]]  (Abnormal) Collected: 09/10/22 2049    Specimen: Blood Updated: 09/10/22 2114     Heparin Anti-Xa (UFH) 0.27 IU/ml     aPTT [[id number]]   (Normal) Collected: 09/10/22 1621    Specimen: Blood from Arm, Left Updated: 09/10/22 1714     PTT 75.4 seconds     Narrative:      PTT = The equivalent PTT values for the therapeutic range of heparin levels at 0.3 to 0.5 U/ml are 60 to 70 seconds.    CBC (No Diff) [[id number]]  (Abnormal) Collected: 09/10/22 1621    Specimen: Blood from Arm, Left Updated: 09/10/22 1633     WBC 16.03 10*3/mm3      RBC 3.99 10*6/mm3      Hemoglobin 10.0 g/dL      Hematocrit 32.1 %      MCV 80.5 fL      MCH 25.1 pg      MCHC 31.2 g/dL      RDW 16.0 %      RDW-SD 46.0 fl      MPV 9.7 fL      Platelets 237 10*3/mm3     aPTT [[id number]]  (Abnormal) Collected: 09/10/22 0643    Specimen: Blood Updated: 09/10/22 0742     .1 seconds     Narrative:      PTT = The equivalent PTT values for the therapeutic range of heparin levels at 0.3 to 0.5 U/ml are 60 to 70 seconds.    Fibrinogen [[id number]]  (Abnormal) Collected: 09/10/22 0643    Specimen: Blood Updated: 09/10/22 0742     Fibrinogen 103 mg/dL     CBC (No Diff) [[id number]]  (Abnormal) Collected: 09/10/22 0643    Specimen: Blood Updated: 09/10/22 0709     WBC 14.91 10*3/mm3      RBC 4.05 10*6/mm3      Hemoglobin 10.0 g/dL      Hematocrit 32.9 %      MCV 81.2 fL      MCH 24.7 pg      MCHC 30.4 g/dL      RDW 16.4 %      RDW-SD 48.1 fl      MPV 9.4 fL      Platelets 268 10*3/mm3     Basic Metabolic Panel [[id number]]  (Abnormal) Collected: 09/10/22 0032    Specimen: Blood Updated: 09/10/22 0132     Glucose 145 mg/dL      BUN 8 mg/dL      Creatinine 0.77 mg/dL      Sodium 142 mmol/L      Potassium 4.3 mmol/L      Chloride 108 mmol/L      CO2 23.0 mmol/L      Calcium 8.8 mg/dL      BUN/Creatinine Ratio 10.4     Anion Gap 11.0 mmol/L      eGFR 105.9 mL/min/1.73      Comment: National Kidney Foundation and American Society of Nephrology (ASN) Task Force recommended calculation based on the Chronic Kidney Disease Epidemiology Collaboration (CKD-EPI) equation refit without adjustment for race.        Narrative:      GFR Normal >60  Chronic Kidney Disease <60  Kidney Failure <15      Magnesium [[id number]]  (Normal) Collected: 09/10/22 0032    Specimen: Blood Updated: 09/10/22 0132     Magnesium 2.0 mg/dL     Phosphorus [[id number]]  (Normal) Collected: 09/10/22 0032    Specimen: Blood Updated: 09/10/22 0132     Phosphorus 3.8 mg/dL     aPTT [[id number]]  (Abnormal) Collected: 09/10/22 0032    Specimen: Blood Updated: 09/10/22 0130     PTT 49.3 seconds     Narrative:      PTT = The equivalent PTT values for the therapeutic range of heparin levels at 0.3 to 0.5 U/ml are 60 to 70 seconds.    Fibrinogen [[id number]]  (Normal) Collected: 09/10/22 0032    Specimen: Blood Updated: 09/10/22 0129     Fibrinogen 264 mg/dL     CBC (No Diff) [[id number]]  (Abnormal) Collected: 09/10/22 0032    Specimen: Blood Updated: 09/10/22 0112     WBC 11.41 10*3/mm3      RBC 3.93 10*6/mm3      Hemoglobin 9.6 g/dL      Hematocrit 32.0 %      MCV 81.4 fL      MCH 24.4 pg      MCHC 30.0 g/dL      RDW 16.3 %      RDW-SD 48.4 fl      MPV 9.8 fL      Platelets 278 10*3/mm3     Basic Metabolic Panel [[id number]]  (Abnormal) Collected: 09/09/22 1914    Specimen: Blood Updated: 09/09/22 2026     Glucose 114 mg/dL      BUN 7 mg/dL      Creatinine 0.79 mg/dL      Sodium 138 mmol/L      Potassium 4.5 mmol/L      Comment: Slight hemolysis detected by analyzer. Results may be affected.        Chloride 104 mmol/L      CO2 21.0 mmol/L      Calcium 8.7 mg/dL      BUN/Creatinine Ratio 8.9     Anion Gap 13.0 mmol/L      eGFR 102.7 mL/min/1.73      Comment: National Kidney Foundation and American Society of Nephrology (ASN) Task Force recommended calculation based on the Chronic Kidney Disease Epidemiology Collaboration (CKD-EPI) equation refit without adjustment for race.       Narrative:      GFR Normal >60  Chronic Kidney Disease <60  Kidney Failure <15      aPTT [[id number]]  (Normal) Collected: 09/09/22 1914    Specimen: Blood Updated: 09/09/22 2014      PTT 90.0 seconds     Narrative:      PTT = The equivalent PTT values for the therapeutic range of heparin levels at 0.3 to 0.5 U/ml are 60 to 70 seconds.    Fibrinogen [[id number]]  (Abnormal) Collected: 09/09/22 1914    Specimen: Blood Updated: 09/09/22 1947     Fibrinogen 562 mg/dL     CBC (No Diff) [[id number]]  (Abnormal) Collected: 09/09/22 1914    Specimen: Blood Updated: 09/09/22 1933     WBC 12.08 10*3/mm3      RBC 4.01 10*6/mm3      Hemoglobin 9.8 g/dL      Hematocrit 31.7 %      MCV 79.1 fL      MCH 24.4 pg      MCHC 30.9 g/dL      RDW 16.5 %      RDW-SD 47.3 fl      MPV 9.2 fL      Platelets 293 10*3/mm3     POC Glucose Once [[id number]]  (Normal) Collected: 09/09/22 1848    Specimen: Blood Updated: 09/09/22 1852     Glucose 93 mg/dL      Comment: Meter: [id number] : [id number] Dave Cueto       Fibrinogen [[id number]]  (Abnormal) Collected: 09/09/22 1745    Specimen: Blood Updated: 09/09/22 1817     Fibrinogen 576 mg/dL     aPTT [[id number]]  (Normal) Collected: 09/09/22 1745    Specimen: Blood Updated: 09/09/22 1817     PTT 81.2 seconds     Narrative:      PTT = The equivalent PTT values for the therapeutic range of heparin levels at 0.3 to 0.5 U/ml are 60 to 70 seconds.    Heparin Anti-Xa [[id number]]  (Abnormal) Collected: 09/09/22 1745    Specimen: Blood Updated: 09/09/22 1810     Heparin Anti-Xa (UFH) 0.25 IU/ml           Imaging Results (Last 72 Hours)     Procedure Component Value Units Date/Time    [facility] [place] OR Procedure [[id number]] Resulted: 09/12/22 0939     Updated: 09/12/22 0939    [facility] [place] [facility] Procedure [[id number]] Resulted: 09/10/22 1214     Updated: 09/10/22 1214    [facility] [place] Procedure [[id number]] Resulted: 09/09/22 1818     Updated: 09/09/22 1818           Operative/Procedure Notes (last 72 hours)      Cooper Zhao MD at 09/09/22 1723          Urszula Gonzalez  9/9/2022    Pre-op Diagnosis:   Bilateral pulmonary emboli  COVID-19 infection  Left iliofemoral  DVT  IVC DVT  Left lower extremity engorgement, pain       Post-Op Diagnosis Codes:  Same    Procedure/CPT® Codes:        Procedure(s):  1.  Supervision and interpretation of radiography fluoroscopy   2.  Ultrasound-guided needle access of the left popliteal vein   3. IVC venogram  4.  Third order cannulation of inferior vena cava  5. Initiation of TPA at left iliofemoral venous system and IVC    Surgeon(s):  Cooper Zhao MD Chaney, John H, MD Earle, Gary F, MD    Anesthesia: Choice    Staff:   Circulator: Haase, Sherri L, RN; Alis Burton RN  Scrub Person: Roland Palacios  Nursing Assistant: Ricky Edmond PCT; Cecilia Quiñonez         Estimated Blood Loss: minimal    Urine Voided: * No values recorded between 9/9/2022  4:41 PM and 9/9/2022  6:28 PM *    Specimens:                None          Drains:   Urethral Catheter Silicone 16 Fr. (Active)       Findings: 50 cm infusion catheter placed in infrarenal IVC extending into left iliofemoral system none        Complications: None          Cooper Zhao MD     Date: 9/9/2022  Time: 18:37 EDT        Electronically signed by Cooper Zhao MD at 09/09/22 1840     Cooper Zhao MD at 09/09/22 1723        DATE OF PROCEDURE: September 9, 2022     PREOPERATIVE DIAGNOSES:  Bilateral pulmonary emboli  COVID-19 infection  Left iliofemoral DVT  IVC DVT  Left lower extremity engorgement, pain     POSTOPERATIVE DIAGNOSES:    Same     PROCEDURES PERFORMED:    1.  Supervision and interpretation of radiography fluoroscopy   2.  Ultrasound-guided needle access of the left popliteal vein   3. IVC venogram  4.  Third order cannulation of inferior vena cava  5. Initiation of TPA at left iliofemoral venous system and IVC    SURGEON:   MD Addison Barraza MD Gary F. Earle, MD     ASSISTANTS:    None    Circulator: Haase, Sherri L, RN; Alis Burton RN  Scrub Person: Roland Palacios  Nursing Assistant: Ricky Edmond PCT; Edwin  Cecilia Roth      ANESTHESIA: General endotracheal anesthesia      ESTIMATED BLOOD LOSS: 5 mL.      Fluoroscopy time less than 1 minute    Total contrast used less than 30 cc     INDICATIONS: This is a 32-year-old woman with a history of obesity and recent COVID-19 infection who presented with recent bilateral pulmonary emboli and complex left iliofemoral DVT that extended into the inferior vena cava which was complicated by severe pain and edema at the left lower extremity.  This was discussed with her cardiologist Dr. Valenzuela and Dr. Martinez as well as multiple members of the cardiothoracic and vascular surgery division Dr. Lamas, Dr. Herman, and the patient was felt to be a reasonable candidate for ileofemoral DVT lysis and mechanical thrombectomy to prevent long-term sequelae of post thrombotic syndrome. The risks and benefits of surgery were discussed with the patient including pain, bleeding, infection, myocardial infarction and death. The patient understood these risks and wished to proceed with surgery.      DESCRIPTION OF PROCEDURE:  The patient was taken to the operating room and placed under general endotracheal anesthesia.  A Stanley catheter was placed in urethra.  The patient was laid in the prone position and the bilateral posterior thighs and legs were prepped and draped in the usual sterile fashion.  A timeout was performed including the patient's name, procedure and antibiotic administration.  The patient was given 5000 units of unfractionated heparin IV, in addition to her heparin drip which had not been stopped.  I used an ultrasound to identify the popliteal vein on the left side, and obtain needle access with a single wall puncture.  I placed a 6 Slovenian sheath, and advanced a Glidewire advantage into the IVC after navigating through clot.  I performed venography of the IVC, and identified the renal veins which appear to be around the level of T12/L1.  We then advanced a UniFuse catheter  into the infrarenal IVC, which extended down into the left iliac vein.  We connected the UniFuse wire, and then connected our system to a tPA infusion so that 1 mg/h of tPA would be infused.  We then connected the sheath to 500 cc of unfractionated heparin per hour.  We secured the system about the posterior leg with stitches and an Ioban in a sterile fashion.  All needle, instrument, and sponge counts were correct at the case completion.  I was present for the entire procedure.    Cooper Zhao M.D.   Cardiothoracic and Vascular Surgeon  [facility]            Electronically signed by Cooper Zhao MD at 09/09/22 1850     Cooper Zhao MD at 09/10/22 1030          Cardiothoracic and Vascular Surgery Brief Operative Note    Urszula Gonzalez  9/10/2022    Pre-op Diagnosis:   Bilateral pulmonary emboli  COVID-19 infection  Left iliofemoral DVT  IVC DVT  Left lower extremity engorgement, pain       Post-Op Diagnosis Codes:  Same    Procedure/CPT® Codes:        Procedure(s):  1.  Supervision and interpretation of radiography fluoroscopy   2.  Third order cannulation of inferior vena cava  3.  Venography of the inferior vena cava, left common iliac vein, left external iliac vein, left femoral vein, popliteal vein  4.  Angioplasty of the inferior vena cava, left common iliac vein, left external iliac vein, and left femoral vein with 12 mm balloon  5.  Suction thromboembolectomy of the  inferior vena cava, left common iliac vein, left external iliac vein, and left femoral vein using the CAT 8 penumbra system    Surgeon(s):  Cooper Zhao MD Chaney, John H, MD    Anesthesia: Choice    Staff:   Circulator: Nicole Mallory RN  Radiology Technologist: Jac Peralta  Scrub Person: Grecia Michelle         Estimated Blood Loss: 850 mL    Urine Voided: * No values recorded between 9/10/2022  9:55 AM and 9/10/2022 11:59 AM *    Specimens:                Specimens     ID Source Type Tests Collected By  Collected At Frozen?    A Femoral Artery Tissue · TISSUE PATHOLOGY EXAM   Cooper Zhao MD 9/10/22 1200     Description: ILEOFEMORAL EMBOLUS                Drains:   Urethral Catheter Silicone 16 Fr. (Active)   Daily Indications Hourly Output in Critical Unstable Patient requiring Frequent Intervention (hemodynamics, titration or life supportive therapy) 09/10/22 0800   Site Assessment Clean;Skin intact 09/10/22 0800   Collection Container Standard drainage bag 09/10/22 0800   Securement Method Securing device 09/10/22 0800   Catheter care complete Yes 09/10/22 0800   Output (mL) 400 mL 09/10/22 0800       Findings: Successful extraction of approximately 50% of thromboembolic material at the left iliofemoral venous system and IVC         Complications: None          Cooper Zhao MD     Date: 9/10/2022  Time: 12:16 EDT        Electronically signed by Cooper Zhao MD at 09/10/22 1221     Cooper Zhao MD at 09/10/22 1030        DATE OF PROCEDURE: September 10, 2022     Preoperative Diagnosis:   Bilateral pulmonary emboli  COVID-19 infection  Left iliofemoral DVT  IVC DVT  Left lower extremity engorgement, pain       Postoperative Diagnosis:  Same     Procedure(s):  1.  Supervision and interpretation of radiography fluoroscopy   2.  Third order cannulation of inferior vena cava  3.  Venography of the inferior vena cava, left common iliac vein, left external iliac vein, left femoral vein, popliteal vein  4.  Angioplasty of the inferior vena cava, left common iliac vein, left external iliac vein, and left femoral vein with 12 mm balloon  5.  Suction thromboembolectomy of the  inferior vena cava, left common iliac vein, left external iliac vein, and left femoral vein using the CAT 8 penumbra system     Surgeon(s):  Cooper Zhao MD Chaney, John H, MD Dr. Chaney and Dr. Cochran participated equally in the case and should be considered co-surgeons.     Anesthesia: General endotracheal    Circulator: Mohamud,  ALONZO Rivera  Radiology Technologist: Jac Peralta  Scrub Person: Grecia Michelle      ANESTHESIA: General endotracheal anesthesia     ESTIMATED BLOOD LOSS: 600 mL.       Fluoroscopy time 18 minutes 48 seconds    Radiation dose delivered 2900 mGy    Total contrast volume delivered 70 mL of Isovue-300    INDICATIONS:  This is a 32-year-old woman with a history of obesity and recent COVID-19 infection who presented with recent bilateral pulmonary emboli and complex left iliofemoral DVT that extended into the inferior vena cava which was complicated by severe pain and edema at the left lower extremity.  This was discussed with her cardiologist Dr. Valenzuela and Dr. Martinez as well as multiple members of the cardiothoracic and vascular surgery division Dr. Lamas, Dr. Herman, and the patient was felt to be a reasonable candidate for ileofemoral DVT lysis and mechanical thrombectomy to prevent long-term sequelae of post thrombotic syndrome.  On September 9, 2022 the patient underwent placement of a thrombolysis catheter at the level of the IVC and left iliofemoral vein system via the left popliteal vein and thrombolysis was continued for approximately 16 hours.  Her tPA had been stopped at least 1 hour prior to the procedure.  The patient returned to the operating room today for venogram and additional interventions.  The risks and benefits of surgery were discussed with the patient including pain, bleeding, infection, myocardial infarction and death. The patient understood these risks and wished to proceed with surgery.      DESCRIPTION OF PROCEDURE:  The patient was taken to the operating room and placed under general endotracheal anesthesia.  She was placed in the prone position.  Antibiotics and 5000 units of unfractionated heparin were given systemically, with additional bolusing of heparin systemically to maintain an ACT greater than 300 during the entire procedure.  The posterior left leg, previously placed 6 Israeli  sheath and UniFuse infusion catheter and wire were prepped and draped in the usual sterile fashion..  A timeout was performed including the patient's name, procedure and antibiotic administration.  We began by removing the UniFuse catheter, and inserted guidewire advantage wire that was passed easily into the IVC.  We performed venography initially nonselectively and then selectively at the levels of the popliteal vein, femoral vein, iliac veins, and IVC.  There was still a large amount of remaining thrombotic material, and thus we pursued suction thrombectomy by upsizing to an 8 Bulgarian sheath and placing the "[employer] CAT 8 device at the level of the femoral vein.  We performed multiple passes retrogradely from the femoral vein to the IVC, which was productive of a small amount of clot.  We performed another venogram, which showed improved flow, with a large amount of remaining thrombus.  We performed balloon angioplasty using an 8 mm balloon throughout the length of the common femoral vein, iliac veins, and infrarenal IVC.  Additional venography revealed retained thrombotic material at the level of the left common iliac vein, and thus additional passes with the CAT 8 thromboembolectomy suction catheter were made.  Additional venography revealed improved flow through the iliofemoral system, with a large thrombus that remained at the level of the left common femoral vein.  We determined that additional passes with our suction thrombectomy catheter would likely yield minimal if any additional benefit, and the procedure was terminated.  We remove the gears and held manual pressure for over 30 minutes.  We gave a total of 50 mg of protamine due to prolonged bleeding at the popliteal vein puncture site.  All needle, sponge, and instrument counts were correct at the case completion.  I was present for the entire case.    Cooper Zhao M.D.   Cardiothoracic and Vascular Surgeon  [facility]  [place]      Electronically signed by Cooper Zhao MD at 09/10/22 1233     Cooper Zhao MD at 09/12/22 0805          Brief operative note    Urszula Gonzalez  9/12/2022    Preoperative diagnosis:   Bilateral pulmonary emboli  COVID-19 infection  Left iliofemoral DVT  IVC DVT  Left lower extremity engorgement, pain       Postoperative Diagnosis:  Same    Procedure/CPT® Codes:        Procedure(s):  1. Left common femoral vein ultrasound-guided needle access   2. Right common femoral vein ultrasound-guided needle access   3. Left common iliac vein mechanical thrombectomy percutaneous with extirpation of matter  4. IVC mechanical thrombectomy percutaneous with extirpation of matter  5.  Venography of IVC, left common iliac vein, left external iliac vein, right common iliac vein, right external iliac vein  6.  Placement of 20 mm x 40 mm Wallstent at left common iliac vein  7.  Supervision and interpretation of radiography fluoroscopy    Surgeon(s):  Cooper Zhao MD Dr. John Chaney Dr. Chaney and Dr. Zhao should be considered to be co-surgeon's for this procedure    Anesthesia: General    Staff:   Circulator: Riya Godwin RN  Radiology Technologist: Jac Peralta  Scrub Person: Toby Watson  Nursing Assistant: Linda Calabrese  Assistant: Huong Ray PA-C  Assistant: Huong Ray PA-C      Estimated Blood Loss: minimal    Urine Voided: * No values recorded between 9/12/2022  7:29 AM and 9/12/2022  9:44 AM *    Specimens:                Specimens     ID Source Type Tests Collected By Collected At Frozen?    A Iliac, Left Tissue · TISSUE PATHOLOGY EXAM   Cooper Zhao MD 9/12/22 0993     Description: LEFT ILIAC THROMBUS    This specimen was not marked as sent.                Drains:   Urethral Catheter Silicone 16 Fr. (Active)   Daily Indications Hourly Output in Critical Unstable Patient requiring Frequent Intervention (hemodynamics, titration or life supportive therapy)  09/12/22 0600   Site Assessment Clean;Skin intact 09/12/22 0600   Collection Container Standard drainage bag 09/12/22 0600   Securement Method Securing device 09/12/22 0600   Catheter care complete Yes 09/11/22 2000   Output (mL) 150 mL 09/12/22 0720       Findings: Excellent technical result        Complications: None    Assistant: Huong Ray PA-C  was responsible for performing the following activities: Retraction, Suction, Irrigation, Suturing, Closing and Placing Dressing and their skilled assistance was necessary for the success of this case.    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 Left common femoral vein ultrasound-guided needle access   2. Right common femoral vein ultrasound-guided needle access   3. Left common iliac vein mechanical thrombectomy percutaneous with extirpation of matter using INARI clotriever  4. IVC mechanical thrombectomy percutaneous with extirpation of matter INARI clotriever  5.  Venography of IVC, left common iliac vein, left external iliac vein, right common iliac vein, right external iliac vein  6.  Placement of 20 mm x 40 mm Wallstent at left common iliac vein  7.  Supervision and interpretation of radiography fluoroscopy  8.  Placement of infrarenal IVC filter 25 mm XL INARI flowtriever     SURGEON:   Surgeon(s):  Cooper Zhao MD Chaney, John, MD Dr. Chaney and Dr. Zhao should be considered to be co-surgeons for this procedure    Complications none  Estimated blood loss 50 mL  Radiation dose delivered 582 mGy  Fluoroscopy time 10 minutes 6  seconds  Contrast delivered 80 mL of Isovue-300     ASSISTANTS:    Assistant: Huong Ray PA-C  was responsible for performing the following activities: Retraction, Suction, Irrigation, Suturing, Closing and Placing Dressing and their skilled assistance was necessary for the success of this case.    Circulator: Riya Godwin RN  Radiology Technologist: Jac Peralta  Scrub Person: Toby Watson EVAN  Nursing Assistant: Linda Calabrese  Assistant: Huong Ray PA-C      ANESTHESIA: General endotracheal anesthesia      ESTIMATED BLOOD LOSS: 50 mL.       INDICATIONS:  This is a 32-year-old woman with a history of obesity and recent COVID-19 infection who presented with recent bilateral pulmonary emboli and complex left iliofemoral DVT that extended into the inferior vena cava which was complicated by severe pain and edema at the left lower extremity.  This was discussed with her cardiologist Dr. Valenzuela and Dr. Martinez as well as multiple members of the cardiothoracic and vascular surgery division Dr. Lamas, Dr. Herman, and the patient was felt to be a reasonable candidate for ileofemoral DVT lysis and mechanical thrombectomy to prevent long-term sequelae of post thrombotic syndrome.  On September 9, 2022 the patient underwent placement of a thrombolysis catheter at the level of the IVC and left iliofemoral vein system via the left popliteal vein and thrombolysis was continued for approximately 16 hours.  The patient returned to the operating room on September 10, 2022 for venogram and percutaneous thrombectomy using the [employer] CAT 8 Lightning system, which was able to extract a large amount of material however there was a large amount of remaining thrombotic material at the left common iliac vein concerning for high-grade stenosis and high disease burden at the left common iliac vein.  The patient was still having pain and significant edema at the left thigh and leg.  The risks and benefits of  surgery were discussed with the patient including pain, bleeding, infection, myocardial infarction and death. The patient understood these risks and wished to proceed with surgery     DESCRIPTION OF PROCEDURE:  The patient was taken to the operating room and placed under general endotracheal anesthesia in the supine position.  She was prepped and draped in the usual sterile fashion.  A timeout was performed including the patient's name, procedure and antibiotic administration.  We administered heparin systemically to maintain an ACT greater than 300 seconds and bolused intermittently to achieve this.  We began with ultrasound-guided bilateral femoral vein access and advanced 6 Estonian sheaths into the IVC.  There was clearly subtotal occlusion at the left common iliac vein, as demonstrated by fluoroscopy.  We advanced bilateral advantage glide wires into the intrathoracic IVC, and placed a 12 Estonian sheath at the right common femoral access.  We then placed a 25 mm INARI flowtriever IVC filter at the level of the infrarenal IVC to prevent embolism of the iliac vein thrombus.  We then placed our 13 Estonian INARI clotriever sheath at the left common femoral vein and advanced our catheter to the level of the IVC filter.  We performed 5 passes from the IVC to the left common femoral vein each at 90 degree angles that were productive of a large amount of presumably thrombotic material.  We repeated her venography and there was no concern for thrombotic material at the IVC filter, and this was removed.  There was concern for venous compression at the level of the IVC confluence, and a diagnosis of May Thurner syndrome was made.  We then placed a 20x40 mm venous Wallstent at the level of the left common iliac vein, protruding slightly into the IVC.  Completion venography of the IVC, left iliofemoral venous system, and right iliofemoral venous system showed excellent unobstructed blood flow.  We removed her gears, held manual  "pressure for 20 minutes, achieved good hemostasis.  All instrument, sponge, and needle counts were correct at the case completion.  I was present for the entire case    Cooper Zhao M.D.   Cardiothoracic and Vascular Surgeon  [facility]      Electronically signed by Cooper Zhao MD at 09/12/22 1009          Physician Progress Notes (last 72 hours)      Delaney Rapp PA-C at 09/12/22 1112            [facility]  PROGRESS NOTE    Date of Admission: 9/9/2022  Date of Service: 09/12/22    Primary Care Physician: Cha Fernández APRN    Chief Complaint: PE and DVT    Subjective      Patient seen after venogram/thrombectomy.  Reports her leg is feeling better.  Feels like her shortness of breath is improved some.  Is complaining of back pain but believes that is due to bedrest.      Objective   Vitals: /73   Pulse 70   Temp 98 °F (36.7 °C) (Axillary)   Resp 17   Ht 167.6 cm (65.98\")   Wt 122 kg (268 lb 15.4 oz)   LMP 09/08/2022 (Approximate)   SpO2 93%   Breastfeeding No   BMI 43.43 kg/m²     Physical Exam:  GENERAL:  in no acute distress.   HEENT:no jugular venous distention  HEART: Regular rhythm, normal rate, and no murmurs, gallops, or rubs.   LUNGS: Clear to auscultation bilaterally. No wheezing, rales or rhonchi.  EXTREMITIES: No significant edema noted.     Results:  Results from last 7 days   Lab Units 09/12/22  0449 09/11/22  0349 09/10/22  1621   WBC 10*3/mm3 11.92* 13.80* 16.03*   HEMOGLOBIN g/dL 8.9* 9.0* 10.0*   HEMATOCRIT % 29.4* 28.7* 32.1*   PLATELETS 10*3/mm3 252 230 237     Results from last 7 days   Lab Units 09/12/22  0449 09/11/22  0349 09/10/22  0032   SODIUM mmol/L 142 142 142   POTASSIUM mmol/L 4.1 4.0 4.3   CHLORIDE mmol/L 110* 109* 108*   CO2 mmol/L 24.0 22.0 23.0   BUN mg/dL 10 12 8   CREATININE mg/dL 0.69 0.74 0.77   GLUCOSE mg/dL 105* 140* 145*      Lab Results   Component Value Date    AST 11 04/21/2021    " ALT 6 04/21/2021                     Results from last 7 days   Lab Units 09/10/22  1621 09/10/22  0643 09/10/22  0032 09/09/22  1745 09/09/22  1228   PROTIME Seconds  --   --   --   --  17.2*   INR   --   --   --   --  1.41*   APTT seconds 75.4 109.1* 49.3*   < > 53.3*    < > = values in this interval not displayed.                 Intake/Output Summary (Last 24 hours) at 9/12/2022 1434  Last data filed at 9/12/2022 1400  Gross per 24 hour   Intake 1983 ml   Output 3600 ml   Net -1617 ml       I personally reviewed the patient's EKG/Telemetry data and new clinical data    Current Medications:  [START ON 9/13/2022] aspirin, 81 mg, Oral, Daily  ceFAZolin, 3 g, Intravenous, Q8H  FLUoxetine, 20 mg, Oral, Daily  pantoprazole, 40 mg, Intravenous, Q AM  senna-docusate sodium, 2 tablet, Oral, BID  sodium chloride, 10 mL, Intravenous, Q12H      heparin, 14 Units/kg/hr, Last Rate: 14 Units/kg/hr (09/12/22 0728)  Pharmacy to Dose Heparin,   sodium chloride, 50 mL/hr, Last Rate: 50 mL/hr (09/12/22 1020)        Assessment:  4. Acute pulmonary embolism  1. Echo 9/9/2022: LVEF 62%.  Normal RV size and normal RVSP  5. DVT associated with COVID-19  1. CT abdomen/pelvis 9/3/2022 [facility]: Acute DVT of the left common femoral vein, left external iliac vein, left common iliac vein extending into the infrarenal IVC and almost to level of renal veins  2. Localized thrombolysis catheter placed 9/9/2022  3. Attempted thrombectomy using CAT aspiration catheter with residual large thrombus burden, 9/10/2022  4. Left iliac thrombectomy and placement of stent, IVC mechanical thrombectomy, 9/12/2022  5. Infrarenal IVC filter placed, 9/12/2022  Plan:  1. We will obtain limited echocardiogram to reassess for RV dysfunction/remeasure RV/LV ratio in the setting of PE.   2. Obtain Troponin and ProBNP tomorrow morning.  3. Continue heparin drip for anticoagulation.   4. Postoperative management per ICU/CT surgery.     Delaney Rapp,  ROBERTA        Electronically signed by Delaney Rapp PA-C at 09/12/22 0714     Case, May MONTIEL DO at 09/11/22 1135          INTENSIVIST   PROGRESS NOTE     Hospital:  LOS: 2 days     Ms. Urszula Gonzalez, 32 y.o. female is followed for a Chief Complaint of: DVT      Subjective   S     Interval History:  No acute events overnight.        The patient's relevant past medical, surgical and social history were reviewed and updated in Epic as appropriate.      ROS:   Constitutional: Negative for fever.   Respiratory: Negative for dyspnea.   Cardiovascular: Negative for chest pain.   Gastrointestinal: Negative for  nausea, vomiting and diarrhea.     Objective   O     Vitals:  Temp  Min: 97.6 °F (36.4 °C)  Max: 98 °F (36.7 °C)  BP  Min: 99/60  Max: 177/93  Pulse  Min: 64  Max: 105  Resp  Min: 17  Max: 23  SpO2  Min: 91 %  Max: 100 % Flow (L/min)  Min: 2  Max: 4    Intake/Ouptut 24 hrs (7:00AM - 6:59 AM)  Intake & Output (last 3 days)       09/08 0701  09/09 0700 09/09 0701  09/10 0700 09/10 0701  09/11 0700 09/11 0701  09/12 0700    P.O.   880     I.V. (mL/kg)  2377.2 (19.5) 1720.8 (14.1)     Blood   360     Total Intake(mL/kg)  2377.2 (19.5) 2960.8 (24.3)     Urine (mL/kg/hr)  2875 2465 (0.8) 275 (0.5)    Blood   850     Total Output  2875 3315 275    Net  -497.8 -354.2 -275                  Medications (drips):  heparin, Last Rate: 14 Units/kg/hr (09/11/22 0346)  Pharmacy to Dose Heparin          Physical Examination  Telemetry:  Normal sinus rhythm.    Constitutional:  No acute distress.  Resting in bed comfortably.    Eyes: No scleral icterus.   PERRL, EOM intact.    Neck:  Supple, FROM   Cardiovascular: Normal rate, regular and rhythm. Normal heart sounds.  No murmurs, gallop or rub.   Respiratory: No respiratory distress. Normal respiratory effort.  Normal breath sounds  Clear to auscultation   Abdominal:  Soft. No masses. Nontender. No distension. No HSM.   Extremities: No digital cyanosis. No  clubbing.  No peripheral edema.   Skin: No rashes, lesions or ulcers   Neurological:   Alert and Oriented to person, place, and time.              Results from last 7 days   Lab Units 09/11/22  0349 09/10/22  1621 09/10/22  0643   WBC 10*3/mm3 13.80* 16.03* 14.91*   HEMOGLOBIN g/dL 9.0* 10.0* 10.0*   MCV fL 81.1 80.5 81.2   PLATELETS 10*3/mm3 230 237 268     Results from last 7 days   Lab Units 09/11/22  0349 09/10/22  0032 09/09/22  1914   SODIUM mmol/L 142 142 138   POTASSIUM mmol/L 4.0 4.3 4.5   CO2 mmol/L 22.0 23.0 21.0*   CREATININE mg/dL 0.74 0.77 0.79   GLUCOSE mg/dL 140* 145* 114*   MAGNESIUM mg/dL 1.9 2.0  --    PHOSPHORUS mg/dL 3.3 3.8  --      Estimated Creatinine Clearance: 146.8 mL/min (by C-G formula based on SCr of 0.74 mg/dL).              Images:  Imaging Results (Last 24 Hours)     Procedure Component Value Units Date/Time    XR [place] OR Procedure [[id number]] Resulted: 09/10/22 1214     Updated: 09/10/22 1214            Results: Reviewed.  I reviewed the patient's new laboratory and imaging results.  I independently reviewed the patient's new images.    Medications: Reviewed.    Assessment & Plan   A / P     Ms. Gonzalez is a 33yo F who is admitted for a left lower extremity DVT with extension into the IVC almost to the level of the renal veins. She was taken to the OR on 9/9 by Dr. Zhao and underwent IVC cannulation and IV tPA. She was transferred to the ICU after the procedure.     She returned to the OR on 9/10 for re-look angiography and thrombectomy. Unfortunately, very little thrombus was able to be removed.     Nutrition:   Diet Regular  NPO Diet NPO Type: Sips with Meds  Advance Directives:   Code Status and Medical Interventions:   Ordered at: 09/09/22 1229     Level Of Support Discussed With:    Patient     Code Status (Patient has no pulse and is not breathing):    CPR (Attempt to Resuscitate)     Medical Interventions (Patient has pulse or is breathing):    Full Support        Active Hospital Problems    Diagnosis    • **Deep vein thrombosis (DVT) associated with COVID-19    • Acute pulmonary embolism without acute cor pulmonale (HCC)    • Examination for normal comparison for clinical research    • Depression    • Gastroesophageal reflux disease without esophagitis        Assessment / Plan:    6. Continue ICU care  7. Heparin drip.   8. Pain control with PCA  9. Bedrest  10. Back to the OR on Monday for possible venogram and thrombectomy via the femoral vein  11. Regular diet and NPO after MN.  12. AM labs      High level of risk due to:  severe exacerbation of chronic illness and illness with threat to life or bodily function.    Plan of care and goals reviewed during interdisciplinary rounds.  I discussed the patient's findings and my recommendations with patient, family and nursing staff        May Ervin DO    Intensive Care Medicine and Pulmonary Medicine       Electronically signed by May Ervin DO at 09/11/22 1136     Addison Lamas MD at 09/11/22 1118          Cardiothoracic Surgery Progress Note      POD # 1/2 s/p Venogram, TPA catheter insertion popliteal vein, thrombectomy     LOS: 2 days      Subjective:  Some pain in left leg.  Denies shortness of breath    Objective:  Vital Signs  Temp:  [97.6 °F (36.4 °C)-98 °F (36.7 °C)] 97.7 °F (36.5 °C)  Heart Rate:  [] 68  Resp:  [17-23] 20  BP: ()/() 112/64    Physical Exam:   General Appearance: alert, appears stated age and cooperative   Lungs: clear to auscultation, respirations regular, respirations even and respirations unlabored   Heart: regular rhythm & normal rate, normal S1, S2 and no murmur, no gallop, no rub   Skin: Incision c/d/i   Left foot warm, 2+ DP pulse palpable, motor and sensation intact, bilateral lower extremity edema  Results:    Results from last 7 days   Lab Units 09/11/22  0349   WBC 10*3/mm3 13.80*   HEMOGLOBIN g/dL 9.0*   HEMATOCRIT % 28.7*   PLATELETS 10*3/mm3 230      Results from last 7 days   Lab Units 09/11/22  0349   SODIUM mmol/L 142   POTASSIUM mmol/L 4.0   CHLORIDE mmol/L 109*   CO2 mmol/L 22.0   BUN mg/dL 12   CREATININE mg/dL 0.74   GLUCOSE mg/dL 140*   CALCIUM mg/dL 8.0*       Assessment:  POD # 1 s/p Venogram, TPA catheter insertion popliteal vein  Ileofemoral and IVC DVT    Plan:  Continue heparin gtt  Possible venogram and thrombectomy on Monday via femoral vein/IJ  Bedrest    Addison Lamas MD  09/11/22  11:18 EDT          Electronically signed by Addison Lamas MD at 09/11/22 1120     Case, May MONTIEL DO at 09/10/22 1649          INTENSIVIST   PROGRESS NOTE     Hospital:  LOS: 1 day     Ms. Urszula Gonzalez, 32 y.o. female is followed for a Chief Complaint of: DVT      Subjective   S     Interval History:  She returned to the OR today and underwent relook angiography with aspiration thrombectomy.        The patient's relevant past medical, surgical and social history were reviewed and updated in Epic as appropriate.      ROS:   Constitutional: Negative for fever.   Respiratory: Negative for dyspnea.   Cardiovascular: Negative for chest pain.   Gastrointestinal: Negative for  nausea, vomiting and diarrhea.     Objective   O     Vitals:  Temp  Min: 98 °F (36.7 °C)  Max: 98.3 °F (36.8 °C)  BP  Min: 105/75  Max: 177/93  Pulse  Min: 48  Max: 102  Resp  Min: 18  Max: 23  SpO2  Min: 93 %  Max: 100 % Flow (L/min)  Min: 2  Max: 4    Intake/Ouptut 24 hrs (7:00AM - 6:59 AM)  Intake & Output (last 3 days)       09/07 0701  09/08 0700 09/08 0701 09/09 0700 09/09 0701  09/10 0700 09/10 0701 09/11 0700    I.V. (mL/kg)   2377.2 (19.5) 900 (7.4)    Blood    360    Total Intake(mL/kg)   2377.2 (19.5) 1260 (10.3)    Urine (mL/kg/hr)   2875 400 (0.3)    Blood    850    Total Output   2875 1250    Net   -497.8 +10                  Medications (drips):  heparin, Last Rate: 12 Units/kg/hr (09/10/22 2984)  HYDROmorphone HCl-NaCl  Pharmacy to Dose Heparin          Physical  Examination  Telemetry:  Normal sinus rhythm.    Constitutional:  No acute distress.  Resting in bed comfortably.    Eyes: No scleral icterus.   PERRL, EOM intact.    Neck:  Supple, FROM   Cardiovascular: Normal rate, regular and rhythm. Normal heart sounds.  No murmurs, gallop or rub.   Respiratory: No respiratory distress. Normal respiratory effort.  Normal breath sounds  Clear to auscultation   Abdominal:  Soft. No masses. Nontender. No distension. No HSM.   Extremities: No digital cyanosis. No clubbing.  No peripheral edema.   Skin: No rashes, lesions or ulcers   Neurological:   Alert and Oriented to person, place, and time.              Results from last 7 days   Lab Units 09/10/22  1621 09/10/22  0643 09/10/22  0032   WBC 10*3/mm3 16.03* 14.91* 11.41*   HEMOGLOBIN g/dL 10.0* 10.0* 9.6*   MCV fL 80.5 81.2 81.4   PLATELETS 10*3/mm3 237 268 278     Results from last 7 days   Lab Units 09/10/22  0032 09/09/22  1914 09/09/22  1228   SODIUM mmol/L 142 138 140   POTASSIUM mmol/L 4.3 4.5 4.0   CO2 mmol/L 23.0 21.0* 23.0   CREATININE mg/dL 0.77 0.79 0.77   GLUCOSE mg/dL 145* 114* 92   MAGNESIUM mg/dL 2.0  --   --    PHOSPHORUS mg/dL 3.8  --   --      Estimated Creatinine Clearance: 141 mL/min (by C-G formula based on SCr of 0.77 mg/dL).              Images:  Imaging Results (Last 24 Hours)     Procedure Component Value Units Date/Time    [facility] OR Procedure [[id number]] Resulted: 09/10/22 1214     Updated: 09/10/22 1214    [facility] OR Procedure [[id number]] Resulted: 09/09/22 1818     Updated: 09/09/22 1818            Results: Reviewed.  I reviewed the patient's new laboratory and imaging results.  I independently reviewed the patient's new images.    Medications: Reviewed.    Assessment & Plan   A / P     Ms. Gonzalez is a 31yo F who is admitted for a left lower extremity DVT with extension into the IVC almost to the level of the renal veins. She was taken to the OR on 9/9 by Dr. Zhao and underwent IVC  cannulation and IV tPA. She was transferred to the ICU after the procedure.     She returned to the OR on 9/10 for re-look angiography and thrombectomy. Unfortunately, very little thrombus was able to be removed.     Nutrition:   Diet Clear Liquid  Advance Directives:   Code Status and Medical Interventions:   Ordered at: 09/09/22 1229     Level Of Support Discussed With:    Patient     Code Status (Patient has no pulse and is not breathing):    CPR (Attempt to Resuscitate)     Medical Interventions (Patient has pulse or is breathing):    Full Support       Active Hospital Problems    Diagnosis    • **Deep vein thrombosis (DVT) associated with COVID-19    • Acute pulmonary embolism without acute cor pulmonale (HCC)    • Examination for normal comparison for clinical research    • Depression    • Gastroesophageal reflux disease without esophagitis        Assessment / Plan:    13. Continue ICU care  14. Heparin drip.   15. Pain control with PCA  16. Bedrest  17. Home Prozac  18. Back to the OR on Monday for possible venogram and thrombectomy via the femoral vein  19. CLD  20. AM labs      High level of risk due to:  severe exacerbation of chronic illness and illness with threat to life or bodily function.    Plan of care and goals reviewed during interdisciplinary rounds.  I discussed the patient's findings and my recommendations with patient, family and nursing staff        May Ervin DO    Intensive Care Medicine and Pulmonary Medicine       Electronically signed by May Ervin DO at 09/10/22 7838     Efe Lala IV, MD at 09/10/22 7333          Cardiology Progress Note      Reason for visit:    · Iliofemoral DVT    IDENTIFICATION: 32-year-old female who resides in [place], [place]    Active Hospital Problems    Diagnosis  POA   • **Acute pulmonary embolism without acute cor pulmonale (HCC) [I26.99]  Yes     · Echo (9/9/2022): LVEF 62%.  Normal RV size and normal RVSP     • Deep vein  thrombosis (DVT) associated with COVID-19 [U07.1, I82.90]  Yes     Priority: High     · CT Abd/pelvis, 9/3/22 [place]: acute DVT within the left common femoral vein, left external iliac vein, left common iliac vein extending into the infrarenal IVC and almost to the level of renal veins.  · Localized thrombolysis catheter placed, 9/9/2022  · Attempted thrombectomy using CAT aspiration catheter with residual large thrombus burden, 9/10/2022     • Examination for normal comparison for clinical research [Z00.6]  Not Applicable   • Depression [F32.A]  Yes   • Gastroesophageal reflux disease without esophagitis [K21.9]  Yes       Subjective     · Patient brought taken back to the OR today for relook angiography.  Residual ileal caval DVT present  · Aspiration Thrombectomy with CAT device yielded little thrombus          Objective   Vital Sign Min/Max for last 24 hours  Temp  Min: 97 °F (36.1 °C)  Max: 98.3 °F (36.8 °C)   BP  Min: 105/75  Max: 177/93   Pulse  Min: 48  Max: 102   Resp  Min: 18  Max: 22   SpO2  Min: 93 %  Max: 99 %   Flow (L/min)  Min: 2  Max: 4      Intake/Output Summary (Last 24 hours) at 9/10/2022 1500  Last data filed at 9/10/2022 1243  Gross per 24 hour   Intake 3637.2 ml   Output 4125 ml   Net -487.8 ml           Physical Exam  Constitutional:       Appearance: Normal appearance.   HENT:      Head: Normocephalic and atraumatic.   Cardiovascular:      Rate and Rhythm: Normal rate and regular rhythm.      Heart sounds: No murmur heard.  Neurological:      Mental Status: She is alert.         Tele: Sinus    Results Review (reviewed the patient's recent labs in the electronic medical record):       Results from last 7 days   Lab Units 09/10/22  0032 09/09/22  1914 09/09/22  1228   SODIUM mmol/L 142 138 140   POTASSIUM mmol/L 4.3 4.5 4.0   CHLORIDE mmol/L 108* 104 104   BUN mg/dL 8 7 8   CREATININE mg/dL 0.77 0.79 0.77   MAGNESIUM mg/dL 2.0  --   --          Results from last 7 days   Lab Units  09/10/22  0643 09/10/22  0032 09/09/22  1914   WBC 10*3/mm3 14.91* 11.41* 12.08*   HEMOGLOBIN g/dL 10.0* 9.6* 9.8*   HEMATOCRIT % 32.9* 32.0* 31.7*   PLATELETS 10*3/mm3 268 278 293       Lab Results   Component Value Date    HGBA1C 5.83 (H) 10/09/2020       Lab Results   Component Value Date    AST 11 04/21/2021    ALT 6 04/21/2021         Assessment     Ileal caval DVT  · Attempted thrombectomy today without resolution of the thrombus  · Further plans per CT surgery    Pulmonary embolus  · Continue anticoagulation          Plan     · No further cardiac recommendations at this time  · Please contact us if additional cardiology assistance needed    Electronically signed by Efe Lala IV, MD, 09/10/22, 2:58 PM EDT.      Electronically signed by Efe Lala IV, MD at 09/10/22 1548     Addison Lamas MD at 09/10/22 1430          Cardiothoracic Surgery Progress Note      POD # 1 s/p Venogram, TPA catheter insertion popliteal vein     LOS: 1 day      Subjective:  Pain in left leg improved today.  Denies shortness of breath    Objective:  Vital Signs  Temp:  [97 °F (36.1 °C)-98.3 °F (36.8 °C)] 98.3 °F (36.8 °C)  Heart Rate:  [] 87  Resp:  [18-22] 22  BP: (105-177)/() 149/94    Physical Exam:   General Appearance: alert, appears stated age and cooperative   Lungs: clear to auscultation, respirations regular, respirations even and respirations unlabored   Heart: regular rhythm & normal rate, normal S1, S2 and no murmur, no gallop, no rub   Skin: Incision c/d/i   Left foot warm, 2+ DP pulse, motor and sensation intact  Results:  Results from last 7 days   Lab Units 09/10/22  0643   WBC 10*3/mm3 14.91*   HEMOGLOBIN g/dL 10.0*   HEMATOCRIT % 32.9*   PLATELETS 10*3/mm3 268     Results from last 7 days   Lab Units 09/10/22  0032   SODIUM mmol/L 142   POTASSIUM mmol/L 4.3   CHLORIDE mmol/L 108*   CO2 mmol/L 23.0   BUN mg/dL 8   CREATININE mg/dL 0.77   GLUCOSE mg/dL 145*   CALCIUM mg/dL 8.8  "      Assessment:  POD # 1 s/p Venogram, TPA catheter insertion popliteal vein  Ileofemoral and IVC DVT    Plan:  Continue heparin gtt  Possible venogram and thrombectomy on Monday via femoral vein     Addison Lamas MD  09/10/22  14:30 EDT          Electronically signed by Addison Lamas MD at 09/10/22 1436     Johnny Panchal MD at 09/09/22 1937          Pulmonary/Critical Care Progress Note     LOS: 0 days   Patient Care Team:  Cha Fernández APRN as PCP - General (Family Medicine)    Chief Complaint: Symptomatic DVT    Subjective     HPI: Urszula Gonzalez is a 32 y.o. female who was admitted on 9/9 by Cardiology.      She tells me her symptoms originated as L foot pain on 8/18 which she was told was \"heel spurs\".  This pain continued to worsen over the next week.  She developed SOA on 8/21 which prompted her to take a home COVID-19 test which was positive.  She continued to have mild CHAVEZ and worsenign LLE pain w/ some swelling of her ankle.  On 9/3 she had 2 syncopal episodes after her  massaged her leg which prompted her to present to [facility].  She was initially treated w/ heparin gtt and transitioned to Eliquis prior to D/C the next day.    Shortly after arriving home she had excruciating pain in her LLE and returned to the ED where repeat imaging showed \"acute DVT within the left common femoral vein, left external iliac vein, left common iliac vein extending into the infrarenal IVC and almost to the level of renal veins\".  She was admitted and started on coumadin.  The intention was to transfer to [facility] but they were unable to secure a bed.   She was transferred to our facility for possible intervention on 9/9.  ECHO obtained on day of admission shows normal EF, normal RV size, wall thickness, septal motion, and systolic function.    Dr. Zhao was consulted on day of admission and the patient was taken to the OR for IVC cannulation and intravenous TPA administration to the " "IVC/left iliofemoral venous system.  Plan is to return to the OR @ 0800 on 9/10 for mechanical thrombectomy.      Patient has several risk factors for hypercoagulable state: MO, recent COVID-19 infection (asymptomatic, tested + on home test ), and possible family hx of clotting disorder (grandmother had \"red marks\" on her legs).  She does not smoke and is not on oral birth control.  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   She denies any long trips or prolonged immobility or prior trauma to her legs.      I performed an independent history and physical examination. Portions of the history were obtained by KERVIN Nj who spent 7 minutes on patient care and were modified by me according to my findings. The above note reflects my findings, assessment, and plan.    Johnny Panchal MD    History taken from: patient    Past Medical History:   Diagnosis Date   • Anemia    • Anxiety    • Bradycardia, unspecified    • Depression    • DVT (deep venous thrombosis) (HCC)     Left Lower Extremity.   • Dysfunctional gallbladder    • GERD (gastroesophageal reflux disease)    • Gestational hypertension    • H. pylori infection    • Hypertension    • Hyponatremia    • Ovarian cyst    • Pulmonary emboli (HCC)    • Tachycardia        Past Surgical History:   Procedure Laterality Date   •  SECTION  2018   •  SECTION PRIMARY  2016   •  SECTION WITH TUBAL Bilateral 2021    Procedure:  SECTION REPEAT WITH TUBAL;  Surgeon: Vinayak Gutierrez MD;  Location:  [facility];  Service: Obstetrics/Gynecology;  Laterality: Bilateral;   • CHOLECYSTECTOMY N/A 3/13/2020    Procedure: CHOLECYSTECTOMY LAPAROSCOPIC;  Surgeon: Toby Murphy MD;  Location: [facility] OR;  Service: General;  Laterality: N/A;   • COLONOSCOPY     • ENDOSCOPY     • GASTRIC SLEEVE LAPAROSCOPIC     • TONSILLECTOMY     • WISDOM TOOTH EXTRACTION         Family History "   Problem Relation Age of Onset   • Thyroid disease Mother    • Graves' disease Mother    • Myasthenia gravis Mother    • No Known Problems Sister    • Hypertension Father    • Hypertension Brother    • Diabetes Maternal Grandmother    • Cirrhosis Maternal Grandmother    • Cancer Maternal Grandmother    • No Known Problems Paternal Grandmother    • Cancer Paternal Grandfather    • Hypertension Brother    • No Known Problems Son    • No Known Problems Daughter        Social History     Socioeconomic History   • Marital status:      Spouse name: nate gary   • Number of children: 2   • Highest education level: High school graduate   Tobacco Use   • Smoking status: Never Smoker   • Smokeless tobacco: Never Used   Vaping Use   • Vaping Use: Never used   Substance and Sexual Activity   • Alcohol use: Not Currently     Comment: social   • Drug use: Never   • Sexual activity: Defer     Partners: Male     Birth control/protection: None, Surgical       Allergies   Allergen Reactions   • Ciprofloxacin Hives       PMH/FH/SocH were reviewed by me and updates were made.     Review of Systems: see H&P and HPI   All systems were reviewed and negative except as noted in subjective.      Objective     Vital Signs  Temp:  [97 °F (36.1 °C)-97.2 °F (36.2 °C)] 97 °F (36.1 °C)  Heart Rate:  [78-90] 82  Resp:  [18-20] 20  BP: (128-150)/() 137/82    Physical Exam:     General Appearance:    Alert, cooperative, in no acute distress   Head:    Normocephalic, without obvious abnormality, atraumatic   Eyes:            Lids and lashes normal, conjunctivae and sclerae normal,   no icterus, no pallor, corneas clear, PERRLA   ENMT:   Ears appear intact with no abnormalities noted      No oral lesions, no thrush, oral mucosa moist   Neck:   No adenopathy, supple, trachea midline, no thyromegaly, no carotid bruit, no JVD   Lungs/resp:     Normal effort, symmetric chest rise, no crepitus, clear to      auscultation bilaterally, no  chest wall tenderness, resonant to percussion throughout.                  Heart/CV:    Regular rhythm and normal rate, normal S1 and S2, no         murmur, no gallop, no rub, no click   Abdomen/GI:     Normal bowel sounds, no masses, no organomegaly, soft     nontender, nondistended, no guarding, no rebound                tenderness   G/U:     Deferred   Extremities/MSK:   No clubbing, cyanosis or edema.  No deformities.    Pulses:   Pulses palpable and equal bilaterally   Skin:   No bleeding, bruising or rash   Hem/Lymph:   No cervical or supraclavicular adenopathy.    Neurologic:   Moves all extremities with no obvious focal motor deficit.  Cranial nerves 2 - 12 grossly intact            Psychiatric:  Normal mood and affect, oriented x 3.      Results Review:     I reviewed the patient's new clinical results.   Results from last 7 days   Lab Units 09/09/22 1914 09/09/22  1228   SODIUM mmol/L 138 140   POTASSIUM mmol/L 4.5 4.0   CHLORIDE mmol/L 104 104   CO2 mmol/L 21.0* 23.0   BUN mg/dL 7 8   CREATININE mg/dL 0.79 0.77   CALCIUM mg/dL 8.7 8.9   GLUCOSE mg/dL 114* 92     Results from last 7 days   Lab Units 09/09/22 1914 09/09/22  1228   WBC 10*3/mm3 12.08* 11.40*   HEMOGLOBIN g/dL 9.8* 9.9*   HEMATOCRIT % 31.7* 32.0*   PLATELETS 10*3/mm3 293 292           I reviewed the patient's new imaging including images and reports.    Medication Review:   alteplase (CATHFLO) INTRACATHETER injection, 8 mg, Intravenous, Once  [START ON 9/10/2022] pantoprazole, 40 mg, Intravenous, Q AM  sodium chloride, 10 mL, Intravenous, Q12H      alteplase (ACTIVASE) 20 mg in 0.9% NaCl 500 mL, 1 mg/hr, Last Rate: 1 mg/hr (09/09/22 2008)  heparin, 500 Units/hr, Last Rate: 4.098 Units/kg/hr (09/09/22 1924)  HYDROmorphone HCl-NaCl,   lactated ringers, 125 mL/hr, Last Rate: 125 mL/hr (09/09/22 2056)  sodium chloride, 35 mL/hr        Assessment & Plan       Acute pulmonary embolism without acute cor pulmonale (HCC)    Gastroesophageal reflux  disease without esophagitis    Deep vein thrombosis (DVT) associated with COVID-19    Examination for normal comparison for clinical research    Depression    32 y.o. recently diagnosed with bilateral pulmonary embolism and treated at [facility].  Subsequently developed worsening left lower extremity pain and very presented where imaging showed extensive left DVT extending up almost to the level of the renal veins.  She was transferred here for further surgical evaluation.    Patient was taken to the operating room and underwent IVC cannulation and IV tPA administration by Dr. Zhao on 9/9/2022.  Plan is to take the patient back to the operating room for thrombectomy on 9/10/2022.    Patient reports pain is currently adequately controlled.    Plan:  1.  For DVT/pulmonary embolism: Getting tPA.  Plan to go back to operating room tomorrow per Dr. Zhao.  Patient will need anticoagulation for at least a year in my opinion.  Patient to follow-up with labs drawn at [facility] on presentation.  2.  For GERD on home PPI: Continue Protonix.  3.  For depression: Continue Prozac.    Electronically signed by:    Johnny Panchal MD  09/09/22  23:40 EDT            Electronically signed by Johnny Panchal MD at 09/09/22 9620

## 2022-09-12 NOTE — OP NOTE
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 Left common femoral vein ultrasound-guided needle access   2. Right common femoral vein ultrasound-guided needle access   3. Left common iliac vein mechanical thrombectomy percutaneous with extirpation of matter using INARI clotriever  4. IVC mechanical thrombectomy percutaneous with extirpation of matter INARI clotriever  5.  Venography of IVC, left common iliac vein, left external iliac vein, right common iliac vein, right external iliac vein  6.  Placement of 20 mm x 40 mm Wallstent at left common iliac vein  7.  Supervision and interpretation of radiography fluoroscopy  8.  Placement of infrarenal IVC filter 25 mm XL INARI flowtriever     SURGEON:   Surgeon(s):  Cooper Zhao MD Chaney, John, MD Dr. Chaney and Dr. Zhao should be considered to be co-surgeons for this procedure    Complications none  Estimated blood loss 50 mL  Radiation dose delivered 582 mGy  Fluoroscopy time 10 minutes 6 seconds  Contrast delivered 80 mL of Isovue-300     ASSISTANTS:    Assistant: Huong Ray PA-C  was responsible for performing the following activities: Retraction, Suction, Irrigation, Suturing, Closing and Placing Dressing and their skilled assistance was necessary for the success of this case.    Circulator: Riya Godwin RN  Radiology Technologist: Jac Peralta  Scrub Person: Toby Watson  Nursing Assistant: Linda Calabrese  Assistant: Huong Ray PA-C      ANESTHESIA: General endotracheal anesthesia      ESTIMATED BLOOD LOSS: 50 mL.       INDICATIONS:  This is a 32-year-old woman with a history of obesity and recent COVID-19 infection who presented  with recent bilateral pulmonary emboli and complex left iliofemoral DVT that extended into the inferior vena cava which was complicated by severe pain and edema at the left lower extremity.  This was discussed with her cardiologist Dr. Valenzuela and Dr. Martinez as well as multiple members of the cardiothoracic and vascular surgery division Dr. Laams, Dr. Herman, and the patient was felt to be a reasonable candidate for ileofemoral DVT lysis and mechanical thrombectomy to prevent long-term sequelae of post thrombotic syndrome.  On September 9, 2022 the patient underwent placement of a thrombolysis catheter at the level of the IVC and left iliofemoral vein system via the left popliteal vein and thrombolysis was continued for approximately 16 hours.  The patient returned to the operating room on September 10, 2022 for venogram and percutaneous thrombectomy using the [employer] CAT 8 Lightning system, which was able to extract a large amount of material however there was a large amount of remaining thrombotic material at the left common iliac vein concerning for high-grade stenosis and high disease burden at the left common iliac vein.  The patient was still having pain and significant edema at the left thigh and leg.  The risks and benefits of surgery were discussed with the patient including pain, bleeding, infection, myocardial infarction and death. The patient understood these risks and wished to proceed with surgery     DESCRIPTION OF PROCEDURE:  The patient was taken to the operating room and placed under general endotracheal anesthesia in the supine position.  She was prepped and draped in the usual sterile fashion.  A timeout was performed including the patient's name, procedure and antibiotic administration.  We administered heparin systemically to maintain an ACT greater than 300 seconds and bolused intermittently to achieve this.  We began with ultrasound-guided bilateral femoral vein access and advanced 6 Turkmen  sheaths into the IVC.  There was clearly subtotal occlusion at the left common iliac vein, as demonstrated by fluoroscopy.  We advanced bilateral advantage glide wires into the intrathoracic IVC, and placed a 12 Nepali sheath at the right common femoral access.  We then placed a 25 mm INARI flowtriever IVC filter at the level of the infrarenal IVC to prevent embolism of the iliac vein thrombus.  We then placed our 13 Nepali INARI clotriever sheath at the left common femoral vein and advanced our catheter to the level of the IVC filter.  We performed 5 passes from the IVC to the left common femoral vein each at 90 degree angles that were productive of a large amount of presumably thrombotic material.  We repeated her venography and there was no concern for thrombotic material at the IVC filter, and this was removed.  There was concern for venous compression at the level of the IVC confluence, and a diagnosis of May Thurner syndrome was made.  We then placed a 20x40 mm venous Wallstent at the level of the left common iliac vein, protruding slightly into the IVC.  Completion venography of the IVC, left iliofemoral venous system, and right iliofemoral venous system showed excellent unobstructed blood flow.  We removed her gears, held manual pressure for 20 minutes, achieved good hemostasis.  All instrument, sponge, and needle counts were correct at the case completion.  I was present for the entire case    Cooper Zhao M.D.   Cardiothoracic and Vascular Surgeon  [facility]

## 2022-09-12 NOTE — ANESTHESIA PROCEDURE NOTES
Airway  Urgency: elective    Date/Time: 9/12/2022 7:40 AM  Airway not difficult    General Information and Staff    Patient location during procedure: OR  CRNA/CAA: Katie Ayon CRNA    Indications and Patient Condition  Indications for airway management: airway protection    Preoxygenated: yes  MILS maintained throughout  Mask difficulty assessment: 2 - vent by mask + OA or adjuvant +/- NMBA    Final Airway Details  Final airway type: endotracheal airway      Successful airway: ETT  Cuffed: yes   Successful intubation technique: direct laryngoscopy  Endotracheal tube insertion site: oral  Blade: Walker  Blade size: 2  ETT size (mm): 7.5  Cormack-Lehane Classification: grade I - full view of glottis  Placement verified by: chest auscultation and capnometry   Cuff volume (mL): 5  Measured from: lips  ETT/EBT  to lips (cm): 21  Number of attempts at approach: 1  Assessment: lips, teeth, and gum same as pre-op and atraumatic intubation    Additional Comments  Pt to OR 2. Pt moved to OR table with roller and OR staff assistance. ASA monitors placed. Pre-O2 with 100% Oxygen. SIVI. Atraumatic intubation. +ETCO2, +BBS. Bilateral enlarged and bumpy looking tonsills noted upon DL.

## 2022-09-12 NOTE — ANESTHESIA POSTPROCEDURE EVALUATION
Patient: Urszula Gonzalez    Procedure Summary     Date: 09/12/22 Room / Location: [facility] OR 02 / [facility] HYBRID JULIO    Anesthesia Start: 0728 Anesthesia Stop:     Procedure: ULTRASOUND GUIDED BILATERAL FEMORAL VEIN ACCESS, LEFT ILIAC THROMBECTOMY WITH INARI, VENOGRAPHY, AND PLACEMENT OF 20X40 WALL STENT (Left Thigh) Diagnosis:     Surgeons: Cooper Zhao MD Provider: Ho Mejia MD    Anesthesia Type: general ASA Status: 3          Anesthesia Type: general    Vitals  Vitals Value Taken Time   /98 09/12/22 1001   Temp 98 °F (36.7 °C) 09/12/22 1001   Pulse 74 09/12/22 1001   Resp 24 09/12/22 1001   SpO2 91 % 09/12/22 1001           Post Anesthesia Care and Evaluation    Patient location during evaluation: ICU  Patient participation: complete - patient participated  Level of consciousness: awake and alert  Pain score: 0  Pain management: adequate    Airway patency: patent  Anesthetic complications: No anesthetic complications  PONV Status: none  Cardiovascular status: hemodynamically stable and acceptable  Respiratory status: nonlabored ventilation, acceptable and nasal cannula  Hydration status: acceptable    Comments: Pt transferred to ICU with O2. Vital signs stable. Report to ICU RN and care accepted.

## 2022-09-12 NOTE — PROGRESS NOTES
INTENSIVIST   PROGRESS NOTE     Hospital:  LOS: 3 days     Ms. Urszula Gonzalez, 32 y.o. female admitted on 9/09 with PE and left lower extremity DVT; Clot with extension into the IVC almost to the level of the renal veins.  Patient was taken to the OR (9/09) and received IVC cannulation and IV tPA.  She was transferred to the ICU postprocedure for observation and management.  Patient returned to the OR (9/10) for repeat angiography and thrombectomy but unfortunately only a small amount of thrombus was removed.  She remained in the ICU over the weekend (9/10-11) receiving continuous heparin drip and close observation.  She was taken back to the OR this morning (9/12) for venogram/thrombectomy via the femoral vein.       Subjective   S     Interval History:  Evaluated post procedure this morning (9/12).  Reportedly tolerated well.  Patient hemodynamically stable on nasal cannula.  Alert/oriented.  Afebrile. Endorsing 5/10 lower back pain otherwise no acute concerns.       The patient's relevant past medical, surgical and social history were reviewed and updated in Epic as appropriate.      ROS:   Constitutional: Negative for fever.   Respiratory: Negative for dyspnea.   Cardiovascular: Negative for chest pain.   Gastrointestinal: Negative for  nausea, vomiting and diarrhea.     Objective   O     Vitals:  Temp  Min: 97.1 °F (36.2 °C)  Max: 98.4 °F (36.9 °C)  BP  Min: 115/85  Max: 141/75  Pulse  Min: 62  Max: 106  Resp  Min: 16  Max: 24  SpO2  Min: 90 %  Max: 98 % Flow (L/min)  Min: 6  Max: 6    Intake/Ouptut 24 hrs (7:00AM - 6:59 AM)  Intake & Output (last 3 days)       09/09 0701  09/10 0700 09/10 0701 09/11 0700 09/11 0701 09/12 0700 09/12 0701 09/13 0700    P.O.  880 400     I.V. (mL/kg) 2377.2 (19.5) 1720.8 (14.1) 483 (4) 1100 (9)    Blood  360      Total Intake(mL/kg) 2377.2 (19.5) 2960.8 (24.3) 883 (7.2) 1100 (9)    Urine (mL/kg/hr) 2875 2465 (0.8) 1975 (0.7) 525 (1)    Blood  850      Total Output 4789  3315 1975 525    Net -497.8 -354.2 -1092 +575                  Medications (drips):  heparin, Last Rate: 14 Units/kg/hr (09/12/22 0728)  Pharmacy to Dose Heparin  sodium chloride, Last Rate: 50 mL/hr (09/12/22 1020)      Physical Examination  Telemetry:   Sinus tachycardia      Constitutional:  No acute distress.   Eyes: No scleral icterus.   PERRL, EOM intact.    Neck:  Supple, FROM   Cardiovascular: Normal rate, regular and rhythm. Normal heart sounds.  No murmurs, gallop or rub.   Respiratory: No respiratory distress. Normal respiratory effort.  Normal breath sounds  Clear to auscultation and percussion.    Abdominal:  Soft. No masses. Nontender. No distension. No HSM.   Extremities: No digital cyanosis. No clubbing.  No peripheral edema.   Skin: No rashes, lesions or ulcers   Neurological:   Alert and Oriented to person, place, and time.       Lines, Drains & Airways     Active LDAs     Name Placement date Placement time Site Days    Peripheral IV 09/09/22 1239 Anterior;Right Forearm 09/09/22  1239  Forearm  2    Peripheral IV 09/09/22 1650 Right Hand 09/09/22  1650  created via procedure documentation  Hand  2    Peripheral IV 09/12/22 0500 Left Antecubital 09/12/22  0500  Antecubital  less than 1    Urethral Catheter Silicone 16 Fr. 09/09/22  --  -- 3                Results from last 7 days   Lab Units 09/12/22 0449 09/11/22 0349 09/10/22  1621   WBC 10*3/mm3 11.92* 13.80* 16.03*   HEMOGLOBIN g/dL 8.9* 9.0* 10.0*   MCV fL 83.1 81.1 80.5   PLATELETS 10*3/mm3 252 230 237     Results from last 7 days   Lab Units 09/12/22 0449 09/11/22  0349 09/10/22  0032   SODIUM mmol/L 142 142 142   POTASSIUM mmol/L 4.1 4.0 4.3   CO2 mmol/L 24.0 22.0 23.0   CREATININE mg/dL 0.69 0.74 0.77   GLUCOSE mg/dL 105* 140* 145*   MAGNESIUM mg/dL 2.1 1.9 2.0   PHOSPHORUS mg/dL 4.4 3.3 3.8     Estimated Creatinine Clearance: 157.4 mL/min (by C-G formula based on SCr of 0.69 mg/dL).        Results: Reviewed.  I reviewed the patient's  new laboratory and imaging results.  I independently reviewed the patient's new images.    Medications: Reviewed.    Assessment & Plan   A / P       Nutrition:   NPO Diet NPO Type: Sips with Meds  Advance Directives:   Code Status and Medical Interventions:   Ordered at: 09/09/22 1229     Level Of Support Discussed With:    Patient     Code Status (Patient has no pulse and is not breathing):    CPR (Attempt to Resuscitate)     Medical Interventions (Patient has pulse or is breathing):    Full Support       Active Hospital Problems    Diagnosis    • **Deep vein thrombosis (DVT) associated with COVID-19    • Acute pulmonary embolism without acute cor pulmonale (HCC)    • Examination for normal comparison for clinical research    • Depression    • Gastroesophageal reflux disease without esophagitis        Assessment / Plan:    #Pulmonary embolism (bilateral)  #DVT (IVC, left iliofemoral)  #Acute hypoxic respiratory failure  #Lower extremity pain/swelling  -Etiology of DVT likely associated with hypercoagulability from COVID-19 infection  -Status post IVC cannulation + IV tPA (9/09) and thrombectomy x2 (9/10, 9/12).  Follow-up operative report  -Bedrest for approximately 6 hours post procedure (per protocol)  -Continue heparin drip for now; Will discuss definitive anticoagulation management with cardiology/cardiothoracic surgery moving forward  -Advance diet as tolerated    #Lower back pain  -Likely secondary to immobility; patient lying flat for procedure and to remain at bedrest postprocedure per protocol.  Will follow  -Control pain as needed.  Currently has Norco 5/325 as needed every 4 hours    High level of risk due to:  severe exacerbation of chronic illness and illness with threat to life or bodily function.    Critical Care Time: was greater than 30 minutes.(This excludes time spent performing separately reportable procedures and services). including high complexity decision making to assess, manipulate, and  support vital organ system failure in this individual who has impairment of one or more vital organ systems such that there is a high probability of imminent or life threatening deterioration in the patient’s condition.    -- Nigel Corral MD  Pulmonary/Critical Care

## 2022-09-13 LAB
ANION GAP SERPL CALCULATED.3IONS-SCNC: 10 MMOL/L (ref 5–15)
BASOPHILS # BLD AUTO: 0.03 10*3/MM3 (ref 0–0.2)
BASOPHILS NFR BLD AUTO: 0.2 % (ref 0–1.5)
BUN SERPL-MCNC: 8 MG/DL (ref 6–20)
BUN/CREAT SERPL: 11.4 (ref 7–25)
CALCIUM SPEC-SCNC: 8.4 MG/DL (ref 8.6–10.5)
CHLORIDE SERPL-SCNC: 107 MMOL/L (ref 98–107)
CO2 SERPL-SCNC: 23 MMOL/L (ref 22–29)
CREAT SERPL-MCNC: 0.7 MG/DL (ref 0.57–1)
CYTO UR: NORMAL
CYTO UR: NORMAL
DEPRECATED RDW RBC AUTO: 47.9 FL (ref 37–54)
EGFRCR SERPLBLD CKD-EPI 2021: 118.7 ML/MIN/1.73
EOSINOPHIL # BLD AUTO: 0 10*3/MM3 (ref 0–0.4)
EOSINOPHIL NFR BLD AUTO: 0 % (ref 0.3–6.2)
ERYTHROCYTE [DISTWIDTH] IN BLOOD BY AUTOMATED COUNT: 16.5 % (ref 12.3–15.4)
GLUCOSE SERPL-MCNC: 134 MG/DL (ref 65–99)
HCT VFR BLD AUTO: 28.1 % (ref 34–46.6)
HGB BLD-MCNC: 8.7 G/DL (ref 12–15.9)
IMM GRANULOCYTES # BLD AUTO: 0.25 10*3/MM3 (ref 0–0.05)
IMM GRANULOCYTES NFR BLD AUTO: 1.9 % (ref 0–0.5)
LAB AP CASE REPORT: NORMAL
LAB AP CASE REPORT: NORMAL
LAB AP CLINICAL INFORMATION: NORMAL
LAB AP CLINICAL INFORMATION: NORMAL
LYMPHOCYTES # BLD AUTO: 2.46 10*3/MM3 (ref 0.7–3.1)
LYMPHOCYTES NFR BLD AUTO: 18.2 % (ref 19.6–45.3)
MCH RBC QN AUTO: 25.5 PG (ref 26.6–33)
MCHC RBC AUTO-ENTMCNC: 31 G/DL (ref 31.5–35.7)
MCV RBC AUTO: 82.4 FL (ref 79–97)
MONOCYTES # BLD AUTO: 1.4 10*3/MM3 (ref 0.1–0.9)
MONOCYTES NFR BLD AUTO: 10.4 % (ref 5–12)
NEUTROPHILS NFR BLD AUTO: 69.3 % (ref 42.7–76)
NEUTROPHILS NFR BLD AUTO: 9.35 10*3/MM3 (ref 1.7–7)
NRBC BLD AUTO-RTO: 0.8 /100 WBC (ref 0–0.2)
NT-PROBNP SERPL-MCNC: 310.6 PG/ML (ref 0–450)
PATH REPORT.FINAL DX SPEC: NORMAL
PATH REPORT.FINAL DX SPEC: NORMAL
PATH REPORT.GROSS SPEC: NORMAL
PATH REPORT.GROSS SPEC: NORMAL
PLATELET # BLD AUTO: 248 10*3/MM3 (ref 140–450)
PMV BLD AUTO: 9.6 FL (ref 6–12)
POTASSIUM SERPL-SCNC: 4.2 MMOL/L (ref 3.5–5.2)
QT INTERVAL: 338 MS
QTC INTERVAL: 442 MS
RBC # BLD AUTO: 3.41 10*6/MM3 (ref 3.77–5.28)
SODIUM SERPL-SCNC: 140 MMOL/L (ref 136–145)
TROPONIN T SERPL-MCNC: <0.01 NG/ML (ref 0–0.03)
UFH PPP CHRO-ACNC: 0.13 IU/ML (ref 0.3–0.7)
UFH PPP CHRO-ACNC: 0.3 IU/ML (ref 0.3–0.7)
WBC NRBC COR # BLD: 13.49 10*3/MM3 (ref 3.4–10.8)

## 2022-09-13 PROCEDURE — 85520 HEPARIN ASSAY: CPT

## 2022-09-13 PROCEDURE — 99232 SBSQ HOSP IP/OBS MODERATE 35: CPT | Performed by: INTERNAL MEDICINE

## 2022-09-13 PROCEDURE — 85520 HEPARIN ASSAY: CPT | Performed by: STUDENT IN AN ORGANIZED HEALTH CARE EDUCATION/TRAINING PROGRAM

## 2022-09-13 PROCEDURE — 85025 COMPLETE CBC W/AUTO DIFF WBC: CPT | Performed by: STUDENT IN AN ORGANIZED HEALTH CARE EDUCATION/TRAINING PROGRAM

## 2022-09-13 PROCEDURE — 25010000002 CEFAZOLIN PER 500 MG: Performed by: STUDENT IN AN ORGANIZED HEALTH CARE EDUCATION/TRAINING PROGRAM

## 2022-09-13 PROCEDURE — 99223 1ST HOSP IP/OBS HIGH 75: CPT | Performed by: INTERNAL MEDICINE

## 2022-09-13 PROCEDURE — 99232 SBSQ HOSP IP/OBS MODERATE 35: CPT | Performed by: STUDENT IN AN ORGANIZED HEALTH CARE EDUCATION/TRAINING PROGRAM

## 2022-09-13 PROCEDURE — 25010000002 HEPARIN (PORCINE) 25000-0.45 UT/250ML-% SOLUTION: Performed by: STUDENT IN AN ORGANIZED HEALTH CARE EDUCATION/TRAINING PROGRAM

## 2022-09-13 PROCEDURE — 84484 ASSAY OF TROPONIN QUANT: CPT | Performed by: INTERNAL MEDICINE

## 2022-09-13 PROCEDURE — 83880 ASSAY OF NATRIURETIC PEPTIDE: CPT | Performed by: INTERNAL MEDICINE

## 2022-09-13 PROCEDURE — 80048 BASIC METABOLIC PNL TOTAL CA: CPT | Performed by: STUDENT IN AN ORGANIZED HEALTH CARE EDUCATION/TRAINING PROGRAM

## 2022-09-13 RX ORDER — PANTOPRAZOLE SODIUM 40 MG/1
40 TABLET, DELAYED RELEASE ORAL
Status: DISCONTINUED | OUTPATIENT
Start: 2022-09-14 | End: 2022-09-14 | Stop reason: HOSPADM

## 2022-09-13 RX ADMIN — ASPIRIN 81 MG: 81 TABLET, COATED ORAL at 08:48

## 2022-09-13 RX ADMIN — HYDROCODONE BITARTRATE AND ACETAMINOPHEN 2 TABLET: 5; 325 TABLET ORAL at 16:29

## 2022-09-13 RX ADMIN — LORAZEPAM 0.5 MG: 0.5 TABLET ORAL at 08:48

## 2022-09-13 RX ADMIN — HYDROCODONE BITARTRATE AND ACETAMINOPHEN 2 TABLET: 5; 325 TABLET ORAL at 23:03

## 2022-09-13 RX ADMIN — HYDROCODONE BITARTRATE AND ACETAMINOPHEN 2 TABLET: 5; 325 TABLET ORAL at 02:37

## 2022-09-13 RX ADMIN — APIXABAN 10 MG: 5 TABLET, FILM COATED ORAL at 17:02

## 2022-09-13 RX ADMIN — PANTOPRAZOLE SODIUM 40 MG: 40 INJECTION, POWDER, FOR SOLUTION INTRAVENOUS at 05:47

## 2022-09-13 RX ADMIN — FLUOXETINE 20 MG: 20 CAPSULE ORAL at 08:47

## 2022-09-13 RX ADMIN — LORAZEPAM 0.5 MG: 0.5 TABLET ORAL at 02:38

## 2022-09-13 RX ADMIN — Medication 10 ML: at 21:39

## 2022-09-13 RX ADMIN — HEPARIN SODIUM 10 UNITS/KG/HR: 10000 INJECTION, SOLUTION INTRAVENOUS at 02:38

## 2022-09-13 RX ADMIN — LORAZEPAM 0.5 MG: 0.5 TABLET ORAL at 17:02

## 2022-09-13 RX ADMIN — CEFAZOLIN 3 G: 10 INJECTION, POWDER, FOR SOLUTION INTRAVENOUS at 00:07

## 2022-09-13 RX ADMIN — LORAZEPAM 0.5 MG: 0.5 TABLET ORAL at 23:01

## 2022-09-13 RX ADMIN — SENNOSIDES AND DOCUSATE SODIUM 2 TABLET: 50; 8.6 TABLET ORAL at 21:39

## 2022-09-13 NOTE — PROGRESS NOTES
INTENSIVIST   PROGRESS NOTE     Hospital:  LOS: 4 days     Ms. Urszlua Gonzalez, 32 y.o. female admitted on 9/09 with PE and left lower extremity DVT; Clot with extension into the IVC almost to the level of the renal veins.  Patient was taken to the OR (9/09) and received IVC cannulation and IV tPA.  She was transferred to the ICU postprocedure for observation and management.  Patient returned to the OR (9/10) for repeat angiography and thrombectomy but unfortunately only a small amount of thrombus was removed.  She remained in the ICU over the weekend (9/10-11) receiving continuous heparin drip and close observation.  She was taken back to the OR this morning (9/12) for venogram/thrombectomy via the femoral vein.     Subjective   S     Interval History:  No acute events overnight.  Hemodynamically stable and afebrile.  Ambulated this morning without significant chest pain, presyncope, syncope, hemoptysis, dyspnea.     The patient's relevant past medical, surgical and social history were reviewed and updated in Epic as appropriate.      ROS:   Constitutional: Negative for fever.   Respiratory: Negative for dyspnea.   Cardiovascular: Negative for chest pain.   Gastrointestinal: Negative for  nausea, vomiting and diarrhea.     Objective   O     Vitals:  Temp  Min: 97.8 °F (36.6 °C)  Max: 98.2 °F (36.8 °C)  BP  Min: 115/68  Max: 144/88  Pulse  Min: 56  Max: 106  Resp  Min: 17  Max: 24  SpO2  Min: 90 %  Max: 97 % Flow (L/min)  Min: 2  Max: 6    Intake/Ouptut 24 hrs (7:00AM - 6:59 AM)  Intake & Output (last 3 days)       09/10 0701 09/11 0700 09/11 0701 09/12 0700 09/12 0701 09/13 0700 09/13 0701 09/14 0700    P.O. 880 400 480     I.V. (mL/kg) 1720.8 (14.1) 483 (4) 1621 (13.3)     Blood 360       IV Piggyback   100     Total Intake(mL/kg) 2960.8 (24.3) 883 (7.2) 2201 (18)     Urine (mL/kg/hr) 2465 (0.8) 1975 (0.7) 5750 (2)     Stool   0     Blood 850       Total Output 3315 1975 5750     Net -354.2 -1092 -3542              Stool Unmeasured Occurrence   2 x         Medications (drips):  heparin, Last Rate: 12.5 Units/kg/hr (09/13/22 0401)  niCARdipine  Pharmacy to Dose Heparin  sodium chloride, Last Rate: 50 mL/hr (09/12/22 1020)      Physical Examination  Constitutional:  No acute distress.   Eyes: No scleral icterus.   PERRL, EOM intact.    Neck:  Supple, FROM   Cardiovascular: Normal rate, regular and rhythm. Normal heart sounds.  No murmurs, gallop or rub.   Respiratory: No respiratory distress. Normal respiratory effort.  Normal breath sounds  Clear to auscultation and percussion.    Abdominal:  Soft. No masses. Nontender. No distension. No HSM.   Extremities: No digital cyanosis. No clubbing.  No peripheral edema.   Skin: No rashes, lesions or ulcers   Neurological:   Alert and Oriented to person, place, and time.       Lines, Drains & Airways     Active LDAs     Name Placement date Placement time Site Days    Peripheral IV 09/09/22 1650 Right Hand 09/09/22  1650  created via procedure documentation  Hand  3    Peripheral IV 09/12/22 0500 Left Antecubital 09/12/22  0500  Antecubital  1    Urethral Catheter Silicone 16 Fr. 09/09/22  --  -- 4              Results from last 7 days   Lab Units 09/13/22  0234 09/12/22 0449 09/11/22 0349   WBC 10*3/mm3 13.49* 11.92* 13.80*   HEMOGLOBIN g/dL 8.7* 8.9* 9.0*   MCV fL 82.4 83.1 81.1   PLATELETS 10*3/mm3 248 252 230     Results from last 7 days   Lab Units 09/13/22  0234 09/12/22 0449 09/11/22 0349 09/10/22  0032   SODIUM mmol/L 140 142 142 142   POTASSIUM mmol/L 4.2 4.1 4.0 4.3   CO2 mmol/L 23.0 24.0 22.0 23.0   CREATININE mg/dL 0.70 0.69 0.74 0.77   GLUCOSE mg/dL 134* 105* 140* 145*   MAGNESIUM mg/dL  --  2.1 1.9 2.0   PHOSPHORUS mg/dL  --  4.4 3.3 3.8     Estimated Creatinine Clearance: 152.6 mL/min (by C-G formula based on SCr of 0.7 mg/dL).    Results: Reviewed.  I reviewed the patient's new laboratory and imaging results.  I independently reviewed the patient's new  images.    Medications: Reviewed.    Assessment & Plan   A / P       Nutrition:   Diet Regular; Cardiac, Consistent Carbohydrate  Advance Directives:   Code Status and Medical Interventions:   Ordered at: 09/09/22 1229     Level Of Support Discussed With:    Patient     Code Status (Patient has no pulse and is not breathing):    CPR (Attempt to Resuscitate)     Medical Interventions (Patient has pulse or is breathing):    Full Support       Active Hospital Problems    Diagnosis    • **Deep vein thrombosis (DVT) associated with COVID-19      CT Abd/pelvis, 9/3/22 [place]: acute DVT within the left common femoral vein, left external iliac vein, left common iliac vein extending into the infrarenal IVC and almost to the level of renal veins.  Localized thrombolysis catheter placed, 9/9/2022  Attempted thrombectomy using CAT aspiration catheter with residual large thrombus burden, 9/10/2022     • Acute pulmonary embolism without acute cor pulmonale (HCC)      Echo (9/9/2022): LVEF 62%.  Normal RV size and normal RVSP     • Examination for normal comparison for clinical research    • Depression    • Gastroesophageal reflux disease without esophagitis        Assessment / Plan:    #Pulmonary embolism (bilateral)  #DVT (IVC, left iliofemoral)  #Acute hypoxic respiratory failure  #Lower extremity pain/swelling  -Etiology of DVT likely associated with hypercoagulability from COVID-19 infection  -Status post IVC cannulation + IV tPA (9/09) and thrombectomy x2 (9/10, 9/12)  -Bedrest for approximately 6 hours post procedure (per protocol); Ambulated well after/on day of transfer (9/13)  -Ongoing discussion on management of pulmonary embolism (per cardiology/CT surgery).  Patient ambulated this morning without significant dyspnea or oxygen desaturations.  Limited echocardiogram (9/12) with normal EF and normal RV/LV ratio.  Troponin and proBNP obtained for restratification; WNL  -Hypercoagulability lab evaluation  pending  -Continued heparin drip; Per cardiology/CTS  -Advance diet as tolerated     #Lower back pain  -Improved this AM; Likely secondary to immobility; patient lying flat for procedure and to remain at bedrest postprocedure per protocol  -Control pain as needed.  Currently has Norco 5/325 as needed every 4 hours    Transfer to telemetry (9/13)    F-PO  A-NA   S-NA   T-Heparin gtt  H-NA  U-PPI  G-FSBS per unit protocol; Correction dose insulin  S-NA  B-Monitor and provide per unit protocol   I-PIV  D-NA     High level of risk due to:  severe exacerbation of chronic illness and illness with threat to life or bodily function.     -- Nigel Corral MD  Pulmonary/Critical Care

## 2022-09-13 NOTE — CONSULTS
HEMATOLOGY/ONCOLOGY INPATIENT CONSULT    REASON FOR CONSULT: COVID-19 thrombosis and recommendations for anticoagulation    Subjective   HISTORY OF PRESENT ILLNESS;   Ms. Gonzalez is a 32-year-old lady with a past medical history of anxiety, GERD who presents to [facility] for extensive left lower extremity DVT and PE related to COVID-19.  Patient states that she was diagnosed with COVID-19 on 8/22/2022.  Notes that about 11 days later she started to notice left lower extremity cramping that did not significantly improve.  She was seen in the ED at [facility] and subsequently had a diagnosis of a left lower extremity DVT.  She was given tPA and admitted overnight.  She was discharged the next day with Eliquis.  She took 1 dose of the Eliquis and continued to have significant discomfort in her left lower extremity and tightness in her chest.  She was then readmitted with continued clot in her left lower extremity and significant pulmonary embolus with right heart strain.  She was then transferred to [facility] where she was seen by Dr. Zhao who performed direct thrombotic infusion as well as thrombectomy.  No intervention needed to her pulmonary embolus as she is hemodynamically stable and ambulating without difficulty.  She has never had a DVT or PE in the past.  Notes a grandmother with a history of blood clots.  Has had 3 children with no significant complications regarding miscarriages or DVT during pregnancy      Past Medical History:   Diagnosis Date   • Anemia    • Anxiety    • Bradycardia, unspecified    • Depression    • DVT (deep venous thrombosis) (HCC)     Left Lower Extremity.   • Dysfunctional gallbladder    • GERD (gastroesophageal reflux disease)    • Gestational hypertension    • H. pylori infection    • Hypertension    • Hyponatremia    • Ovarian cyst    • Pulmonary emboli (HCC)    • Tachycardia 2021     Past Surgical History:   Procedure Laterality Date   •   SECTION  2018   •  SECTION PRIMARY  2016   •  SECTION WITH TUBAL Bilateral 2021    Procedure:  SECTION REPEAT WITH TUBAL;  Surgeon: Vniayak Gutierrez MD;  Location: [facility] LABOR DELIVERY;  Service: Obstetrics/Gynecology;  Laterality: Bilateral;   • CHOLECYSTECTOMY N/A 3/13/2020    Procedure: CHOLECYSTECTOMY LAPAROSCOPIC;  Surgeon: Toby Murphy MD;  Location: [facility] OR;  Service: General;  Laterality: N/A;   • COLONOSCOPY     • ENDOSCOPY     • FEMORAL THROMBECTOMY/EMBOLECTOMY Left 2022    Procedure: venogram, initiation of TPA at left leg and IVC;  Surgeon: Cooper Zhao MD;  Location: [facility];  Service: Vascular;  Laterality: Left;   • FEMORAL THROMBECTOMY/EMBOLECTOMY Left 9/10/2022    Procedure: ILEOFEMORAL EMBOLECTOMY, VENOGRAM;  Surgeon: Cooper Zhao MD;  Location: [facility];  Service: Vascular;  Laterality: Left;  FLUORO: 18M48S  DOSE: 2900 MGY  CONTRAST: 70 ML   • FEMORAL THROMBECTOMY/EMBOLECTOMY Left 2022    Procedure: ULTRASOUND GUIDED BILATERAL FEMORAL VEIN ACCESS, LEFT ILIAC THROMBECTOMY WITH INARI, VENOGRAPHY, AND PLACEMENT OF 20X40 WALL STENT;  Surgeon: Cooper Zhao MD;  Location: [facility];  Service: Vascular;  Laterality: Left;  fl- 10 MIN  6 SEC  dose- 582 MGY  contrast- 80 ML      • GASTRIC SLEEVE LAPAROSCOPIC     • TONSILLECTOMY     • WISDOM TOOTH EXTRACTION         No current facility-administered medications on file prior to encounter.     Current Outpatient Medications on File Prior to Encounter   Medication Sig Dispense Refill   • FLUoxetine (PROzac) 20 MG capsule Take 1 capsule by mouth Daily. 90 capsule 1   • oxyCODONE-acetaminophen (PERCOCET) 5-325 MG per tablet Take 1 tablet by mouth Every 6 (Six) Hours As Needed.     • pantoprazole (PROTONIX) 40 MG EC tablet Take 40 mg by mouth Daily.         Allergies   Allergen Reactions   • Ciprofloxacin Hives       Social History     Socioeconomic  "History   • Marital status:      Spouse name: nate gary   • Number of children: 2   • Highest education level: High school graduate   Tobacco Use   • Smoking status: Never Smoker   • Smokeless tobacco: Never Used   Vaping Use   • Vaping Use: Never used   Substance and Sexual Activity   • Alcohol use: Not Currently     Comment: social   • Drug use: Never   • Sexual activity: Defer     Partners: Male     Birth control/protection: None, Surgical       Family History   Problem Relation Age of Onset   • Thyroid disease Mother    • Graves' disease Mother    • Myasthenia gravis Mother    • No Known Problems Sister    • Hypertension Father    • Hypertension Brother    • Diabetes Maternal Grandmother    • Cirrhosis Maternal Grandmother    • Cancer Maternal Grandmother    • No Known Problems Paternal Grandmother    • Cancer Paternal Grandfather    • Hypertension Brother    • No Known Problems Son    • No Known Problems Daughter          REVIEW OF SYSTEMS:  A 12 point review of systems was performed and is negative except as noted above.    Objective   PHYSICAL EXAM:    /50 (BP Location: Right arm, Patient Position: Lying)   Pulse 89   Temp 98.7 °F (37.1 °C) (Oral)   Resp 18   Ht 165.1 cm (65\")   Wt 122 kg (268 lb)   LMP 09/08/2022 (Approximate)   SpO2 90%   Breastfeeding No   BMI 44.60 kg/m²       General: well appearing female in no acute distress  HEENT: sclerae anicteric, oropharynx clear  Lymphatics: no cervical, supraclavicular, inguinal, or axillary adenopathy  Neck: Supple. No thyromegaly.  Cardiovascular: regular rate and rhythm, no murmurs  Lungs: clear to auscultation bilaterally. No respiratory distress  Abdomen: soft, nontender, nondistended.  No palpable organomegaly  Extremities: no lower extremity edema, cyanosis, or clubbing  Skin: no rashes, lesions, bruising, or petechiae  Neuro: Alert and oriented x3. Moves all extremities.    Results:    Results from last 7 days   Lab Units " 09/13/22  0234 09/12/22 0449 09/11/22  0349   WBC 10*3/mm3 13.49* 11.92* 13.80*   HEMOGLOBIN g/dL 8.7* 8.9* 9.0*   PLATELETS 10*3/mm3 248 252 230     Results from last 7 days   Lab Units 09/13/22  0234 09/12/22 0449 09/11/22  0349   SODIUM mmol/L 140 142 142   POTASSIUM mmol/L 4.2 4.1 4.0   CO2 mmol/L 23.0 24.0 22.0   BUN mg/dL 8 10 12   CREATININE mg/dL 0.70 0.69 0.74   GLUCOSE mg/dL 134* 105* 140*             No radiology results for the last 7 days    Assessment    ASSESSMENT & PLAN:  COVID-19 related thrombosis  -Patient with extensive thrombosis in her left lower extremity and pulmonary embolism  -Status post direct catheter therapy and thrombectomy in her left lower extremity thrombosis  -No indication for thrombectomy or direct lateral therapy in her pulmonary embolus that she is hemodynamically stable  -Given the extensiveness of her DVT/PE, will check a hypercoagulable work-up.  Ordered today  -I do not consider her 1 dose of Eliquis treatment failure.  Would plan to transition her to Eliquis 10 mg twice daily x7 days followed by 5 mg twice daily for at least 3 months  -We will plan outpatient follow-up with me in 3 weeks to go over her hypercoagulable work-up and order repeat CT scans and ultrasounds to check for clot resolution at 3 months    Thank you for the consult.  Please do not hesitate to contact with any questions or concerns.  We will plan for outpatient follow-up with me in 3 weeks    Raymond Davey MD  Hematology and Oncology    9/13/2022  15:56 EDT

## 2022-09-13 NOTE — PROGRESS NOTES
"  [facility]  PROGRESS NOTE    Date of Admission: 9/9/2022  Date of Service: 09/13/22    Primary Care Physician: Cha Fernández APRN    Chief Complaint: PE and DVT    Subjective      Ambulated without desaturation on room air today.  No drop in blood pressure.  Had increase in heart rate, which is typical for her.  No significant left lower extremity discomfort    Review of systems:  Positive for mild lower extremity swelling  Negative for chest pain, dyspnea, palpitations      Objective   Vitals: /79 (BP Location: Left arm, Patient Position: Lying)   Pulse 86   Temp 98.4 °F (36.9 °C) (Oral)   Resp 18   Ht 165.1 cm (65\")   Wt 122 kg (268 lb)   LMP 09/08/2022 (Approximate)   SpO2 98%   Breastfeeding No   BMI 44.60 kg/m²     Physical Exam:  GENERAL:  in no acute distress.   HEART: Regular rhythm, normal rate, and no murmurs, gallops, or rubs.   LUNGS: Clear to auscultation bilaterally. No wheezing, rales or rhonchi.    Results:  Today's labs reviewed by myself    Intake/Output Summary (Last 24 hours) at 9/13/2022 1120  Last data filed at 9/13/2022 0900  Gross per 24 hour   Intake 1340.98 ml   Output 5750 ml   Net -4409.02 ml       I personally reviewed the patient's EKG/Telemetry data and new clinical data    Current Medications:  aspirin, 81 mg, Oral, Daily  FLUoxetine, 20 mg, Oral, Daily  [START ON 9/14/2022] pantoprazole, 40 mg, Oral, Q AM  senna-docusate sodium, 2 tablet, Oral, BID  sodium chloride, 10 mL, Intravenous, Q12H      heparin, 12.5 Units/kg/hr, Last Rate: 12.5 Units/kg/hr (09/13/22 0401)  niCARdipine, 5-15 mg/hr  Pharmacy to Dose Heparin,         Assessment:  1. Acute pulmonary embolism  1. Echo 9/9/2022: LVEF 62%.  Normal RV size and normal RVSP  2. Repeat limited echo 9/12 without evidence of RV dilatation, RV strain, RV dysfunction.  Troponin and proBNP remain negative  2. DVT associated with COVID-19  1. CT abdomen/pelvis 9/3/2022 " Shyam: Acute DVT of the left common femoral vein, left external iliac vein, left common iliac vein extending into the infrarenal IVC and almost to level of renal veins  2. Localized thrombolysis catheter placed 9/9/2022  3. Attempted thrombectomy using CAT aspiration catheter with residual large thrombus burden, 9/10/2022  4. Left iliac thrombectomy and placement of stent, IVC mechanical thrombectomy, 9/12/2022  5. Infrarenal IVC filter placed, 9/12/2022  3. POTS    Plan:  1. Deferring on catheter-based pulmonary thrombectomy based on patient's clinical status on room air, normal RV size/function on echo, lack of RV strain, cardiac enzymes within normal limits.  Discussed with patient and Dr. Zhao   2. Pending hematology consult regarding long-term anticoagulation strategy  3. Cardiology will see on an as-needed basis.  Please feel free to call with any questions or concerns.    Rafiq Martinez MD, MSc, FACC, [facility]  Interventional Cardiology  [facility]

## 2022-09-13 NOTE — PLAN OF CARE
Goal Outcome Evaluation:    Neuro- A/Ox4 pleasant, Does have some anxiety issues. Pulses positive in all extremities. Edema noted in lower extremities 3+. Left leg wrapped in ace wrap to help with this edema. PERRLA    Resp- Sats % at room air. Resp 16-18. Lungs clear diminished in the bases. Did become SOA with ambulation.    Cardiac- SR with HR in the 40-70's. B/P 130-140's/50-70's. Pedal pulses positive. Remains on Heparin gtt at 12.5 units/kg/min.    GI/- Stanley cath patent to BSD with yellow urine. Has had 1 moderately sized BM. Tolerating regular diet well.    Pain- receiving narco for pain in back and abdominal cramping.

## 2022-09-13 NOTE — PROGRESS NOTES
Cardiothoracic Surgery Progress Note      POD # 1 s/p Left iliac thrombectomy  POD # 2 s/p TPA catheter insertion into popliteal vein     LOS: 4 days      Subjective:  No complaints this morning  No acute events overnight    Objective:  Vital Signs  Temp:  [97.8 °F (36.6 °C)-98.2 °F (36.8 °C)] 98.2 °F (36.8 °C)  Heart Rate:  [] 56  Resp:  [17-24] 18  BP: (115-144)/(62-98) 138/76    Physical Exam:   General Appearance: alert, appears stated age and cooperative   Lungs: clear to auscultation, respirations regular, respirations even and respirations unlabored   Heart: regular rhythm & normal rate, normal S1, S2, no murmur, no gallop, no rub and no click   Skin: Groin access sites without hematoma      Results:  Results from last 7 days   Lab Units 09/13/22  0234   WBC 10*3/mm3 13.49*   HEMOGLOBIN g/dL 8.7*   HEMATOCRIT % 28.1*   PLATELETS 10*3/mm3 248     Results from last 7 days   Lab Units 09/13/22  0234   SODIUM mmol/L 140   POTASSIUM mmol/L 4.2   CHLORIDE mmol/L 107   CO2 mmol/L 23.0   BUN mg/dL 8   CREATININE mg/dL 0.70   GLUCOSE mg/dL 134*   CALCIUM mg/dL 8.4*       Assessment:  POD # 1 s/p Left iliac thrombectomy  POD # 2 s/p TPA catheter insertion into popliteal vein    Plan:  Ambulate today, if desaturation with ambulation, may require intervention on PE  Continue heparin gtt    Huong Ray PA-C  09/13/22  06:56 EDT

## 2022-09-14 VITALS
HEART RATE: 75 BPM | HEIGHT: 65 IN | WEIGHT: 268 LBS | SYSTOLIC BLOOD PRESSURE: 122 MMHG | DIASTOLIC BLOOD PRESSURE: 64 MMHG | BODY MASS INDEX: 44.65 KG/M2 | OXYGEN SATURATION: 96 % | TEMPERATURE: 98.4 F | RESPIRATION RATE: 18 BRPM

## 2022-09-14 LAB
ACT BLD: 184 SECONDS (ref 82–152)
ACT BLD: 202 SECONDS (ref 82–152)
ACT BLD: 225 SECONDS (ref 82–152)
ACT BLD: 242 SECONDS (ref 82–152)
ACT BLD: 248 SECONDS (ref 82–152)

## 2022-09-14 PROCEDURE — 99238 HOSP IP/OBS DSCHRG MGMT 30/<: CPT | Performed by: STUDENT IN AN ORGANIZED HEALTH CARE EDUCATION/TRAINING PROGRAM

## 2022-09-14 RX ORDER — HYDROCODONE BITARTRATE AND ACETAMINOPHEN 5; 325 MG/1; MG/1
2 TABLET ORAL EVERY 4 HOURS PRN
Qty: 15 TABLET | Refills: 0 | Status: SHIPPED | OUTPATIENT
Start: 2022-09-14

## 2022-09-14 RX ORDER — ASPIRIN 81 MG/1
81 TABLET ORAL DAILY
Qty: 30 TABLET | Refills: 5 | Status: SHIPPED | OUTPATIENT
Start: 2022-09-15

## 2022-09-14 RX ADMIN — PANTOPRAZOLE SODIUM 40 MG: 40 TABLET, DELAYED RELEASE ORAL at 06:13

## 2022-09-14 RX ADMIN — ASPIRIN 81 MG: 81 TABLET, COATED ORAL at 09:22

## 2022-09-14 RX ADMIN — Medication 10 ML: at 09:22

## 2022-09-14 RX ADMIN — LORAZEPAM 0.5 MG: 0.5 TABLET ORAL at 06:16

## 2022-09-14 RX ADMIN — FLUOXETINE 20 MG: 20 CAPSULE ORAL at 09:22

## 2022-09-14 RX ADMIN — APIXABAN 10 MG: 5 TABLET, FILM COATED ORAL at 06:13

## 2022-09-14 NOTE — CASE MANAGEMENT/SOCIAL WORK
Discharge Planning Assessment  [facility]     Patient Name: Urszula Gonzalez  MRN: [id number]  Today's Date: 9/14/2022    Admit Date: 9/9/2022     Discharge Needs Assessment     Row Name 09/14/22 1109       Living Environment    People in Home spouse    Name(s) of People in Home Gurvinder Gonzalez    Current Living Arrangements home    Primary Care Provided by self    Provides Primary Care For no one    Family Caregiver if Needed spouse    Family Caregiver Names Gurvinder Gonzalez    Quality of Family Relationships involved;helpful;supportive    Able to Return to Prior Arrangements yes       Resource/Environmental Concerns    Resource/Environmental Concerns none       Transition Planning    Patient/Family Anticipates Transition to home    Patient/Family Anticipated Services at Transition none    Transportation Anticipated family or friend will provide       Discharge Needs Assessment    Readmission Within the Last 30 Days no previous admission in last 30 days    Equipment Currently Used at Home none    Concerns to be Addressed no discharge needs identified;denies needs/concerns at this time    Anticipated Changes Related to Illness none    Equipment Needed After Discharge none    Discharge Coordination/Progress Home               Discharge Plan     Row Name 09/14/22 1110       Plan    Plan Home    Patient/Family in Agreement with Plan yes    Plan Comments Patient has orders for discharge.  Spoke with patient and spouse at bedside regarding discharge planning.  Patient denies use of HH or DME and reports that she has prescription coverage and medications are affordable.  She lives with her  in a single level house with ground level entry.  She did not received the COVID vaccine.  No discharge needs verbalized.  Patient plan is to discharge home today via car with family to transport.    Final Discharge Disposition Code 01 - home or self-care              Continued Care and Services - Admitted Since 9/9/2022     Coordination has not been started for this encounter.       Expected Discharge Date and Time     Expected Discharge Date Expected Discharge Time    Sep 14, 2022          Demographic Summary     Row Name 09/14/22 1107       General Information    Admission Type inpatient    Arrived From home    Referral Source admission list    Reason for Consult discharge planning    Preferred Language English    General Information Comments KERVIN Zhang       Contact Information    Permission Granted to Share Info With     Contact Information Obtained for     Contact Information Comments Briana Beckwith, mother  [phone]  Gurvinder Gonzalez, spouse  [phone]               Functional Status     Row Name 09/14/22 1109       Functional Status    Usual Activity Tolerance good    Current Activity Tolerance good       Functional Status, IADL    Medications independent    Meal Preparation independent    Housekeeping independent    Laundry independent    Shopping independent       Employment/    Employment/ Comments [employer]               Psychosocial    No documentation.                Abuse/Neglect    No documentation.                Legal    No documentation.                Substance Abuse    No documentation.                Patient Forms    No documentation.                   Maci Quijano RN

## 2022-09-14 NOTE — PLAN OF CARE
Problem: Adult Inpatient Plan of Care  Goal: Absence of Hospital-Acquired Illness or Injury  Intervention: Prevent Skin Injury  Recent Flowsheet Documentation  Taken 9/13/2022 2100 by Mavis Carlson RN  Body Position: position changed independently  Intervention: Prevent and Manage VTE (Venous Thromboembolism) Risk  Recent Flowsheet Documentation  Taken 9/13/2022 2303 by Mavis Carlson RN  Activity Management: ambulated to bathroom  Taken 9/13/2022 2100 by Mavis Carlson RN  VTE Prevention/Management:   sequential compression devices on   bilateral  Goal: Optimal Comfort and Wellbeing  Intervention: Monitor Pain and Promote Comfort  Recent Flowsheet Documentation  Taken 9/13/2022 2303 by Mavis Carlson RN  Pain Management Interventions: pain management plan reviewed with patient/caregiver     Problem: VTE (Venous Thromboembolism)  Goal: VTE (Venous Thromboembolism) Symptom Resolution  Intervention: Prevent or Manage VTE (Venous Thromboembolism)  Recent Flowsheet Documentation  Taken 9/13/2022 2100 by Mavis Carlson RN  VTE Prevention/Management:   sequential compression devices on   bilateral   Goal Outcome Evaluation:

## 2022-09-14 NOTE — DISCHARGE SUMMARY
[facility] Cardiothoracic Surgery Discharge Summary    Name:  Urszula Gonzalez  MRN Number:  [id number]  Date of Admission: 9/9/2022  Date of Discharge:  9/14/2022    Referred By: Rafiq Allen MD  PCP: Cha Fernández APRN  IP Care Team:  Patient Care Team:  Cha Fernández APRN as PCP - General (Family Medicine)    Discharge Diagnosis:  Past Medical History:   Diagnosis Date   • Anemia    • Anxiety    • Bradycardia, unspecified    • Depression    • DVT (deep venous thrombosis) (HCC)     Left Lower Extremity.   • Dysfunctional gallbladder    • GERD (gastroesophageal reflux disease)    • Gestational hypertension    • H. pylori infection    • Hypertension    • Hyponatremia    • Ovarian cyst    • Pulmonary emboli (HCC)    • Tachycardia 2021     Active Hospital Problems    Diagnosis  POA   • **Deep vein thrombosis (DVT) associated with COVID-19 [U07.1, I82.90]  Yes   • Acute pulmonary embolism without acute cor pulmonale (HCC) [I26.99]  Yes   • Examination for normal comparison for clinical research [Z00.6]  Not Applicable   • Depression [F32.A]  Yes   • Gastroesophageal reflux disease without esophagitis [K21.9]  Yes      Resolved Hospital Problems   No resolved problems to display.     Examination for normal comparison for clinical research [Z00.6]    Procedures Performed:  Procedure(s):  ULTRASOUND GUIDED BILATERAL FEMORAL VEIN ACCESS, LEFT ILIAC THROMBECTOMY WITH INARI, VENOGRAPHY, AND PLACEMENT OF 20X40 WALL STENT       Operative Pathology:    Final Diagnosis   1. LEFT ILIAC THROMBUS:  Benign organizing thrombus       History of Present Illness:    This is a 32-year-old woman with a history of POTS during second pregnancy, orthostatic tachycardia and hypotension, hypertension, and obesity who was recently diagnosed with COVID approximately 2.5 weeks ago and is now presenting with acute bilateral PE and DVT in the left common femoral vein, left external iliac vein, left common  iliac vein, infrarenal IVC.  The patient presented to [facility] September 3, 2022 for worsening left lower extremity pain.  She has been treated with a heparin drip and was prescribed Eliquis for her recent diagnoses of PE and DVT, however her left leg edema and pain worsened prompting return to ER.  She was started on Coumadin for Eliquis failure.  She reports a history of grandmother with possible clotting issues however denies any other members of her family having any issue and also denies a personal history of clotting.    Hospital Course:  Patient was taken to the operating room on 9/12/22 for left common iliac mechanical thrombectomy and TPA catheter insertion into popliteal vein with Dr. Zhao.  Patient was extubated without any complications and was sent to ICU in stable condition.  POD 1: No acute events. Hematology was consulted for recommendations on long term anticoagulation.  Per cardiology holding off on catheter-based pulmonary thrombectomy based on patient's clinical status, normal RV size/function on echo, lack of RV strain, and cardiac enzymes were within normal limits.  POD 2: Hematology recommended hypercoagulable work-up, transition to Eliquis 10 mg twice daily x7 days followed by 5 mg twice daily for at least 3 months.  Has outpatient follow-up appointment with Dr. Raymond Davey on 10/4/22. Patient was discharged home in stable condition.     Discharge Medications:     Discharge Medications      New Medications      Instructions Start Date   apixaban 5 MG tablet tablet  Commonly known as: ELIQUIS   10 mg, Oral, Every 12 Hours      apixaban 5 MG tablet tablet  Commonly known as: ELIQUIS   5 mg, Oral, Every 12 Hours Scheduled   Start Date: September 20, 2022     aspirin 81 MG EC tablet   81 mg, Oral, Daily   Start Date: September 15, 2022     HYDROcodone-acetaminophen 5-325 MG per tablet  Commonly known as: NORCO   2 tablets, Oral, Every 4 Hours PRN         Continue These Medications       Instructions Start Date   FLUoxetine 20 MG capsule  Commonly known as: PROzac   20 mg, Oral, Daily      pantoprazole 40 MG EC tablet  Commonly known as: PROTONIX   40 mg, Oral, Daily         Stop These Medications    oxyCODONE-acetaminophen 5-325 MG per tablet  Commonly known as: PERCOCET            Allergies:  Allergies   Allergen Reactions   • Ciprofloxacin Hives       Discharge Diet:  Diet Instructions     Diet: Consistent Carbohydrate, Cardiac      Discharge Diet:  Consistent Carbohydrate  Cardiac             Activity at Discharge:  Activity Instructions     Activity as Tolerated      Bathing Restrictions      Type of Restriction: Bathing    Bathing Restrictions: No Tub Bath    Driving Restrictions      Type of Restriction: Driving    Driving Restrictions: No Driving While Taking Narcotics    Lifting Restrictions      Type of Restriction: Lifting    Lifting Restrictions: Lifting Restriction (Indicate Limit)    Weight Limit (Pounds): 10    Length of Lifting Restriction: until seen at next follow-up appointment          Discharge Education:  Post-Op Education:  Patient was advised on responsible use of opioids in the post-surgical setting.  Patient was advised these medications are highly addictive.  Patient advised on proper disposal of unused opioid medication and was urged to discard any unused opioid medication.  Wound Care:  Patient educated regarding care of post-operative wounds.  Patient is to wash with plain soap and water and pat dry.  Patient is not to use salves on the incision sites.  Patient instructed regarding the signs and symptoms of infection and when to call the office with any concerns.    Follow up Appointments:   Future Appointments   Date Time Provider Department Center   10/4/2022  1:15 PM Raymond Davey MD [facility]     Additional Instructions for the Follow-ups that You Need to Schedule     Call MD With Problems / Concerns   As directed      Instructions:   Please call the  CT Surgery office with any questions or concerns you may have [phone]    Order Comments: Instructions: Please call the CT Surgery office with any questions or concerns you may have [phone]          Discharge Follow-up with PCP   As directed       Currently Documented PCP:    Cha Fernández APRN    PCP Phone Number:    [phone]     Follow Up Details: Follow-up with your PCP within 1-2 weeks         Discharge Follow-up with Specialty: Cardiothoracic Surgery   As directed      Specialty: Cardiothoracic Surgery    Follow Up Details: Follow-up within 2-4 weeks                 KERVIN Rascon  09/14/22  10:35 EDT    Time: I spent 25 minutes on this discharge activity which included: face-to-face encounter with the patient, reviewing the data in the system, coordination of the care with the nursing staff as well as consultants, documentation, and entering orders.

## 2022-09-14 NOTE — PLAN OF CARE
I examined the patient at 6:30 AM  She is progressing as expected, on room air, no chest pain, minimal leg discomfort.  On exam, there is significantly less edema at the left leg.    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   Cardiothoracic and Vascular Surgeon  [facility]

## 2022-09-14 NOTE — CASE MANAGEMENT/SOCIAL WORK
Case Management Discharge Note      Final Note: Patient has orders for discharge.  Spoke with patient and spouse at bedside regarding discharge planning.  Patient denies use of HH or DME and reports that she has prescription coverage and medications are affordable.  She lives with her  in a single level house with ground level entry.  She did not received the COVID vaccine.  No discharge needs verbalized.  Patient plan is to discharge home today via car with family to transport.         Selected Continued Care - Admitted Since 9/9/2022     Destination    No services have been selected for the patient.              Durable Medical Equipment    No services have been selected for the patient.              Dialysis/Infusion    No services have been selected for the patient.              Home Medical Care    No services have been selected for the patient.              Therapy    No services have been selected for the patient.              Community Resources    No services have been selected for the patient.              Community & DME    No services have been selected for the patient.                       Final Discharge Disposition Code: 01 - home or self-care

## 2022-09-14 NOTE — PROGRESS NOTES
Patient is on Apixaban. Education provided on 9/14 verbally and in writing.  Information provided includes effects of medication, drug-drug and drug-food interactions, and signs/symptoms of bleeding and clotting.  Patient verbalized understanding through teach back.  All pertinent questions were answered.     Parish Dominguez, PharmD  9/14/2022  11:29 EDT

## 2022-09-21 ENCOUNTER — TELEPHONE (OUTPATIENT)
Dept: CARDIAC SURGERY | Facility: CLINIC | Age: 32
End: 2022-09-21

## 2022-09-21 NOTE — TELEPHONE ENCOUNTER
Gurvinder is asking if you could give them a call regarding her eliquis.  Said she missed a dose last night, and today is when should would taper down off the original dose she was started on, but they are not sure what she needs to take now. His number is [phone].

## 2022-09-21 NOTE — TELEPHONE ENCOUNTER
Spoke to patient and  about correct Eliquis dosing schedule as outlined by Dr. Raymond Davey with hematology. Patient states she accidentally missed a dose of Eliquis last night (total of 10 mg). She transitioned to 5 mg BID today. Instructed patient to call Dr. Davey's office for further instructions. Patient and  had no further questions or concerns.

## 2022-10-04 ENCOUNTER — LAB (OUTPATIENT)
Dept: LAB | Facility: HOSPITAL | Age: 32
End: 2022-10-04

## 2022-10-04 ENCOUNTER — OFFICE VISIT (OUTPATIENT)
Dept: ONCOLOGY | Facility: CLINIC | Age: 32
End: 2022-10-04

## 2022-10-04 VITALS
TEMPERATURE: 97.8 F | OXYGEN SATURATION: 98 % | SYSTOLIC BLOOD PRESSURE: 147 MMHG | BODY MASS INDEX: 44.6 KG/M2 | HEART RATE: 81 BPM | HEIGHT: 65 IN | RESPIRATION RATE: 18 BRPM | DIASTOLIC BLOOD PRESSURE: 92 MMHG

## 2022-10-04 DIAGNOSIS — U07.1 DEEP VEIN THROMBOSIS (DVT) ASSOCIATED WITH COVID-19: ICD-10-CM

## 2022-10-04 DIAGNOSIS — U07.1 DEEP VEIN THROMBOSIS (DVT) ASSOCIATED WITH COVID-19: Primary | ICD-10-CM

## 2022-10-04 DIAGNOSIS — I26.99 OTHER ACUTE PULMONARY EMBOLISM WITHOUT ACUTE COR PULMONALE: ICD-10-CM

## 2022-10-04 DIAGNOSIS — I82.90 DEEP VEIN THROMBOSIS (DVT) ASSOCIATED WITH COVID-19: Primary | ICD-10-CM

## 2022-10-04 DIAGNOSIS — I82.90 DEEP VEIN THROMBOSIS (DVT) ASSOCIATED WITH COVID-19: ICD-10-CM

## 2022-10-04 LAB
ALBUMIN SERPL-MCNC: 4 G/DL (ref 3.5–5.2)
ALBUMIN/GLOB SERPL: 1.3 G/DL
ALP SERPL-CCNC: 65 U/L (ref 39–117)
ALT SERPL W P-5'-P-CCNC: 17 U/L (ref 1–33)
ANION GAP SERPL CALCULATED.3IONS-SCNC: 15 MMOL/L (ref 5–15)
AST SERPL-CCNC: 19 U/L (ref 1–32)
BASOPHILS # BLD AUTO: 0.04 10*3/MM3 (ref 0–0.2)
BASOPHILS NFR BLD AUTO: 0.5 % (ref 0–1.5)
BILIRUB SERPL-MCNC: 0.3 MG/DL (ref 0–1.2)
BUN SERPL-MCNC: 7 MG/DL (ref 6–20)
BUN/CREAT SERPL: 10.3 (ref 7–25)
CALCIUM SPEC-SCNC: 8.5 MG/DL (ref 8.6–10.5)
CHLORIDE SERPL-SCNC: 107 MMOL/L (ref 98–107)
CO2 SERPL-SCNC: 18 MMOL/L (ref 22–29)
CREAT SERPL-MCNC: 0.68 MG/DL (ref 0.57–1)
DEPRECATED RDW RBC AUTO: 46.7 FL (ref 37–54)
EGFRCR SERPLBLD CKD-EPI 2021: 119.6 ML/MIN/1.73
EOSINOPHIL # BLD AUTO: 0.1 10*3/MM3 (ref 0–0.4)
EOSINOPHIL NFR BLD AUTO: 1.2 % (ref 0.3–6.2)
ERYTHROCYTE [DISTWIDTH] IN BLOOD BY AUTOMATED COUNT: 15.9 % (ref 12.3–15.4)
GLOBULIN UR ELPH-MCNC: 3.2 GM/DL
GLUCOSE SERPL-MCNC: 90 MG/DL (ref 65–99)
HCT VFR BLD AUTO: 33.8 % (ref 34–46.6)
HGB BLD-MCNC: 10.4 G/DL (ref 12–15.9)
IMM GRANULOCYTES # BLD AUTO: 0 10*3/MM3 (ref 0–0.05)
IMM GRANULOCYTES NFR BLD AUTO: 0 % (ref 0–0.5)
LYMPHOCYTES # BLD AUTO: 2.22 10*3/MM3 (ref 0.7–3.1)
LYMPHOCYTES NFR BLD AUTO: 26.2 % (ref 19.6–45.3)
MCH RBC QN AUTO: 24.5 PG (ref 26.6–33)
MCHC RBC AUTO-ENTMCNC: 30.8 G/DL (ref 31.5–35.7)
MCV RBC AUTO: 79.5 FL (ref 79–97)
MONOCYTES # BLD AUTO: 0.78 10*3/MM3 (ref 0.1–0.9)
MONOCYTES NFR BLD AUTO: 9.2 % (ref 5–12)
NEUTROPHILS NFR BLD AUTO: 5.33 10*3/MM3 (ref 1.7–7)
NEUTROPHILS NFR BLD AUTO: 62.9 % (ref 42.7–76)
PLATELET # BLD AUTO: 223 10*3/MM3 (ref 140–450)
PMV BLD AUTO: 9.8 FL (ref 6–12)
POTASSIUM SERPL-SCNC: 3.8 MMOL/L (ref 3.5–5.2)
PROT SERPL-MCNC: 7.2 G/DL (ref 6–8.5)
RBC # BLD AUTO: 4.25 10*6/MM3 (ref 3.77–5.28)
SODIUM SERPL-SCNC: 140 MMOL/L (ref 136–145)
WBC NRBC COR # BLD: 8.47 10*3/MM3 (ref 3.4–10.8)

## 2022-10-04 PROCEDURE — 85613 RUSSELL VIPER VENOM DILUTED: CPT

## 2022-10-04 PROCEDURE — 85025 COMPLETE CBC W/AUTO DIFF WBC: CPT

## 2022-10-04 PROCEDURE — 85300 ANTITHROMBIN III ACTIVITY: CPT

## 2022-10-04 PROCEDURE — 85732 THROMBOPLASTIN TIME PARTIAL: CPT

## 2022-10-04 PROCEDURE — 85705 THROMBOPLASTIN INHIBITION: CPT

## 2022-10-04 PROCEDURE — 86146 BETA-2 GLYCOPROTEIN ANTIBODY: CPT

## 2022-10-04 PROCEDURE — 85303 CLOT INHIBIT PROT C ACTIVITY: CPT

## 2022-10-04 PROCEDURE — 81240 F2 GENE: CPT

## 2022-10-04 PROCEDURE — 85670 THROMBIN TIME PLASMA: CPT

## 2022-10-04 PROCEDURE — 36415 COLL VENOUS BLD VENIPUNCTURE: CPT

## 2022-10-04 PROCEDURE — 85302 CLOT INHIBIT PROT C ANTIGEN: CPT

## 2022-10-04 PROCEDURE — 85306 CLOT INHIBIT PROT S FREE: CPT

## 2022-10-04 PROCEDURE — 99214 OFFICE O/P EST MOD 30 MIN: CPT | Performed by: INTERNAL MEDICINE

## 2022-10-04 PROCEDURE — 80053 COMPREHEN METABOLIC PANEL: CPT

## 2022-10-04 PROCEDURE — 86147 CARDIOLIPIN ANTIBODY EA IG: CPT

## 2022-10-04 PROCEDURE — 85305 CLOT INHIBIT PROT S TOTAL: CPT

## 2022-10-04 PROCEDURE — 81241 F5 GENE: CPT

## 2022-10-04 RX ORDER — HYDROXYZINE HYDROCHLORIDE 25 MG/1
TABLET, FILM COATED ORAL
COMMUNITY
Start: 2022-09-03

## 2022-10-04 RX ORDER — FLUOXETINE HYDROCHLORIDE 40 MG/1
CAPSULE ORAL
COMMUNITY
Start: 2022-07-12 | End: 2023-03-14 | Stop reason: DRUGHIGH

## 2022-10-04 NOTE — PROGRESS NOTES
Follow Up Office Visit      Date: 10/04/2022     Patient Name: Urszula Gonzalez  MRN: [id number]  : 1990  Referring Physician: Hospital follow-up    Chief Complaint: Follow-up for COVID induced DVT/PE    History of Present Illness: Ms. Gonzalez is a 32-year-old lady with a past medical history of anxiety, GERD who presents to [facility] for extensive left lower extremity DVT and PE related to COVID-19.  Patient states that she was diagnosed with COVID-19 on 2022.  Notes that about 11 days later she started to notice left lower extremity cramping that did not significantly improve.  She was seen in the ED at [facility] and subsequently had a diagnosis of a left lower extremity DVT.  She was given tPA and admitted overnight.  She was discharged the next day with Eliquis.  She took 1 dose of the Eliquis and continued to have significant discomfort in her left lower extremity and tightness in her chest.  She was then readmitted with continued clot in her left lower extremity and significant pulmonary embolus with right heart strain.  She was then transferred to [facility] where she was seen by Dr. Zhao who performed direct thrombotic infusion as well as thrombectomy.  No intervention needed to her pulmonary embolus as she is hemodynamically stable and ambulating without difficulty.  She has never had a DVT or PE in the past.  Notes a grandmother with a history of blood clots.  Has had 3 children with no significant complications regarding miscarriages or DVT during pregnancy    Interval History:  Presents clinic for follow-up.  Remains compliant with her apixaban.  Denies any left lower extremity pain or swelling.  Denies any chest pain or shortness of breath.  Continues to have anxiety related to her diagnosis.  Denies any significant easy bleeding or bruising    Oncology History:    Oncology/Hematology History    No history exists.       Subjective       Review of Systems:   Constitutional: Negative for fevers, chills, or weight loss  Eyes: Negative for blurred vision or discharge         Ear/Nose/Throat: Negative for difficulty swallowing, sore throat, LAD                                                       Respiratory: Negative for cough, SOA, wheezing                                                                                        Cardiovascular: Negative for chest pain or palpitations                                                                  Gastrointestinal: Negative for nausea, vomiting or diarrhea                                                                     Genitourinary: Negative for dysuria or hematuria                                                                                           Musculoskeletal: Negative for any joint pains or muscle aches                                                                        Neurologic: Negative for any weakness, headaches, dizziness                                                                         Hematologic: Negative for any easy bleeding or bruising                                                                                   Psychiatric: Negative for anxiety or depression                          Past Medical History/Past Surgical History/ Family History/ Social History: Reviewed by me and unchanged from my previous documentation done on September 2022.     Medications:     Current Outpatient Medications:   •  apixaban (ELIQUIS) 5 MG tablet tablet, Take 1 tablet by mouth Every 12 (Twelve) Hours. Indications: DVT/PE (active thrombosis), Disp: 60 tablet, Rfl: 1  •  aspirin 81 MG EC tablet, Take 1 tablet by mouth Daily., Disp: 30 tablet, Rfl: 5  •  FLUoxetine (PROzac) 40 MG capsule, , Disp: , Rfl:   •  HYDROcodone-acetaminophen (NORCO) 5-325 MG per tablet, Take 2 tablets by mouth Every 4 (Four) Hours As Needed for Moderate Pain., Disp: 15 tablet, Rfl: 0  •  hydrOXYzine  "(ATARAX) 25 MG tablet, TAKE 2 TABLETS BY MOUTH EVERY 6 HOURS AS NEEDED FOR ANXIETY, Disp: , Rfl:   •  pantoprazole (PROTONIX) 40 MG EC tablet, Take 40 mg by mouth Daily., Disp: , Rfl:     Allergies:   Allergies   Allergen Reactions   • Ciprofloxacin Hives       Objective     Physical Exam:  Vital Signs:   Vitals:    10/04/22 1337   BP: 147/92   Pulse: 81   Resp: 18   Temp: 97.8 °F (36.6 °C)   SpO2: 98%   Weight: Comment: PT declined   Height: 165.1 cm (65\")   PainSc: 0-No pain     Pain Score    10/04/22 1337   PainSc: 0-No pain     ECOG Performance Status: 0 - Asymptomatic    Constitutional: NAD, ECOG 0  Eyes: PERRLA, scleral anicteric  ENT: No LAD, no thyromegaly  Respiratory: CTAB, no wheezing, rales, rhonchi  Cardiovascular: RRR, no murmurs, pulses 2+ bilaterally  Abdomen: soft, NT/ND, no HSM  Musculoskeletal: strength 5/5 bilaterally, no c/c/e  Neurologic: A&O x 3, CN II-XII intact grossly    Results Review:   No visits with results within 2 Week(s) from this visit.   Latest known visit with results is:   No results displayed because visit has over 200 results.          Adult Transthoracic Echo Complete W/ Cont if Necessary Per Protocol    Result Date: 9/9/2022  Narrative: · Left ventricular ejection fraction appears to be 61 - 65%. · Normal right ventricular cavity size, wall thickness, systolic function and septal motion noted. · The tricuspid valve is structurally normal with no significant regurgitation present. Insufficient TR velocity profile to estimate the right ventricular systolic pressure.      Adult Transthoracic Echo Limited W/ Cont if Necessary Per Protocol    Result Date: 9/12/2022  Narrative: · This is a limited echocardiogram to reassess right ventricular size and function. · Left ventricular ejection fraction appears to be 66 - 70%. Left ventricular systolic function is normal. · Normal right ventricular cavity size and systolic function noted. · The RV to LV ratio is normal at 0.7.      CT " Outside Films    Result Date: 9/9/2022  Narrative: This procedure was auto-finalized with no dictation required.    CT Outside Films    Result Date: 9/9/2022  Narrative: This procedure was auto-finalized with no dictation required.      Assessment / Plan      Assessment/Plan:   1. Deep vein thrombosis (DVT) associated with COVID-19 (Primary)/2. Other acute pulmonary embolism without acute cor pulmonale (HCC)  -Patient with extensive thrombosis in her left lower extremity and pulmonary embolism secondary to COVID-19  -Status post direct catheter therapy and thrombectomy in her left lower extremity thrombosis  -No indication for thrombectomy or direct lateral therapy in her pulmonary embolus as she was hemodynamically stable in the hospital  -Given the extensiveness of her DVT/PE, will check a hypercoagulable work-up.  Ordered today  -We will plan for at least 3 months of anticoagulation therapy with apixaban 5 mg twice daily  -Have ordered repeat CT chest and left lower extremity duplex to check for clot resolution  -We will determine whether she needs to continue anticoagulation based off lab results and imaging    Follow Up:   Follow-up in 2 months     Raymond Davey MD  Hematology and Oncology     Please note that portions of this note may have been completed with a voice recognition program. Efforts were made to edit the dictations, but occasionally words are mistranscribed.

## 2022-10-05 LAB
F5 GENE MUT ANL BLD/T: NORMAL
FACTOR II, DNA ANALYSIS: NORMAL

## 2022-10-06 ENCOUNTER — OFFICE VISIT (OUTPATIENT)
Dept: CARDIAC SURGERY | Facility: CLINIC | Age: 32
End: 2022-10-06

## 2022-10-06 VITALS
HEART RATE: 64 BPM | TEMPERATURE: 97.8 F | BODY MASS INDEX: 43.26 KG/M2 | SYSTOLIC BLOOD PRESSURE: 128 MMHG | HEIGHT: 66 IN | OXYGEN SATURATION: 98 % | DIASTOLIC BLOOD PRESSURE: 74 MMHG

## 2022-10-06 DIAGNOSIS — U07.1 DEEP VEIN THROMBOSIS (DVT) ASSOCIATED WITH COVID-19: ICD-10-CM

## 2022-10-06 DIAGNOSIS — I26.99 ACUTE PULMONARY EMBOLISM, UNSPECIFIED PULMONARY EMBOLISM TYPE, UNSPECIFIED WHETHER ACUTE COR PULMONALE PRESENT: Primary | ICD-10-CM

## 2022-10-06 DIAGNOSIS — I87.1 MAY-THURNER SYNDROME: ICD-10-CM

## 2022-10-06 DIAGNOSIS — I82.90 DEEP VEIN THROMBOSIS (DVT) ASSOCIATED WITH COVID-19: ICD-10-CM

## 2022-10-06 DIAGNOSIS — I10 ESSENTIAL HYPERTENSION: Primary | ICD-10-CM

## 2022-10-06 LAB
AT III ACT/NOR PPP CHRO: 121 % (ref 75–135)
CARDIOLIPIN IGG SER IA-ACNC: <9 GPL U/ML (ref 0–14)
CARDIOLIPIN IGM SER IA-ACNC: 10 MPL U/ML (ref 0–12)
PROT C ACT/NOR PPP: 105 % (ref 73–180)

## 2022-10-06 PROCEDURE — 99024 POSTOP FOLLOW-UP VISIT: CPT | Performed by: STUDENT IN AN ORGANIZED HEALTH CARE EDUCATION/TRAINING PROGRAM

## 2022-10-06 NOTE — PROGRESS NOTES
10/06/2022  Patient Information  Urszula Patel [facility] [place] [address]   1990  'PCP/Referring Physician'  Cha Fernández, APRN  [phone]  No ref. provider found    Chief Complaint   Patient presents with   • Follow-up     Hospital follow up s/p left iliac thombectomy on 9/12/22       History of Present Illness:    32-year-old woman with a history of POTS during second pregnancy, orthostatic tachycardia and hypotension, hypertension, and obesity who was recently diagnosed with COVID and presented to [facility] on September 9, 2022 with acute bilateral PE and left iliofemoral and infrarenal caval DVT.  The patient had previously presented to [facility] September 3, 2022 for worsening left lower extremity pain.  On September 9, 2022 she underwent venography and placement of endovascular infusion catheter at the IVC and left iliac vein, and initiation of thrombolysis via the left popliteal vein.  The following day September 2022 she underwent repeat venography and endovascular thrombectomy with penumbra device.  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  Postoperatively, the patient has had no lower extremity complaints and is feeling completely normal with resolution of her painful swelling and edema in her left leg.  She does report being out of breath and easily fatigued.  She also reports noticing tachycardia when ambulating.      Patient Active Problem List   Diagnosis   • Gastroesophageal reflux disease without esophagitis   • Mild episode of recurrent  major depressive disorder (HCC)   • Acute pulmonary embolism without acute cor pulmonale (HCC)   • Deep vein thrombosis (DVT) associated with COVID-19   • Examination for normal comparison for clinical research   • Depression     Past Medical History:   Diagnosis Date   • Anemia    • Anxiety    • Bradycardia, unspecified    • Depression    • DVT (deep venous thrombosis) (HCC)     Left Lower Extremity.   • Dysfunctional gallbladder    • GERD (gastroesophageal reflux disease)    • Gestational hypertension    • H. pylori infection    • Hypertension    • Hyponatremia    • Ovarian cyst    • Pulmonary emboli (HCC)    • Tachycardia      Past Surgical History:   Procedure Laterality Date   •  SECTION  2018   •  SECTION PRIMARY  2016   •  SECTION WITH TUBAL Bilateral 2021    Procedure:  SECTION REPEAT WITH TUBAL;  Surgeon: Vinayak Gutierrez MD;  Location: [facility] LABOR DELIVERY;  Service: Obstetrics/Gynecology;  Laterality: Bilateral;   • CHOLECYSTECTOMY N/A 3/13/2020    Procedure: CHOLECYSTECTOMY LAPAROSCOPIC;  Surgeon: Toby Murphy MD;  Location: [facility];  Service: General;  Laterality: N/A;   • COLONOSCOPY     • ENDOSCOPY     • FEMORAL THROMBECTOMY/EMBOLECTOMY Left 2022    Procedure: venogram, initiation of TPA at left leg and IVC;  Surgeon: Cooper Zhao MD;  Location: [facility];  Service: Vascular;  Laterality: Left;   • FEMORAL THROMBECTOMY/EMBOLECTOMY Left 9/10/2022    Procedure: ILEOFEMORAL EMBOLECTOMY, VENOGRAM;  Surgeon: Cooper Zhao MD;  Location:  [facility];  Service: Vascular;  Laterality: Left;  FLUORO: 18M48S  DOSE: 2900 MGY  CONTRAST: 70 ML   • FEMORAL THROMBECTOMY/EMBOLECTOMY Left 2022    Procedure: ULTRASOUND GUIDED BILATERAL FEMORAL VEIN ACCESS, LEFT ILIAC THROMBECTOMY WITH INARI, VENOGRAPHY, AND PLACEMENT OF 20X40 WALL STENT;  Surgeon: Cooper Zhao MD;  Location:  [facility];  Service: Vascular;  Laterality:  Left;  fl- 10 MIN  6 SEC  dose- 582 MGY  contrast- 80 ML      • GASTRIC SLEEVE LAPAROSCOPIC     • TONSILLECTOMY     • WISDOM TOOTH EXTRACTION         Current Outpatient Medications:   •  apixaban (ELIQUIS) 5 MG tablet tablet, Take 1 tablet by mouth Every 12 (Twelve) Hours. Indications: DVT/PE (active thrombosis), Disp: 60 tablet, Rfl: 1  •  aspirin 81 MG EC tablet, Take 1 tablet by mouth Daily., Disp: 30 tablet, Rfl: 5  •  FLUoxetine (PROzac) 40 MG capsule, , Disp: , Rfl:   •  hydrOXYzine (ATARAX) 25 MG tablet, TAKE 2 TABLETS BY MOUTH EVERY 6 HOURS AS NEEDED FOR ANXIETY, Disp: , Rfl:   •  pantoprazole (PROTONIX) 40 MG EC tablet, Take 40 mg by mouth Daily., Disp: , Rfl:   •  HYDROcodone-acetaminophen (NORCO) 5-325 MG per tablet, Take 2 tablets by mouth Every 4 (Four) Hours As Needed for Moderate Pain., Disp: 15 tablet, Rfl: 0  Allergies   Allergen Reactions   • Ciprofloxacin Hives     Social History     Socioeconomic History   • Marital status:      Spouse name: nate gary   • Number of children: 2   • Highest education level: High school graduate   Tobacco Use   • Smoking status: Never Smoker   • Smokeless tobacco: Never Used   Vaping Use   • Vaping Use: Never used   Substance and Sexual Activity   • Alcohol use: Not Currently     Comment: social   • Drug use: Never   • Sexual activity: Defer     Partners: Male     Birth control/protection: None, Surgical     Family History   Problem Relation Age of Onset   • Thyroid disease Mother    • Graves' disease Mother    • Myasthenia gravis Mother    • Hypertension Father    • No Known Problems Sister    • Hypertension Brother    • Hypertension Brother    • Diabetes Maternal Grandmother    • Cirrhosis Maternal Grandmother    • Cancer Maternal Grandmother    • No Known Problems Paternal Grandmother    • Cancer Paternal Grandfather    • No Known Problems Daughter    • No Known Problems Son      Review of Systems   Constitutional: Negative for chills, fever,  "malaise/fatigue, night sweats and weight loss.   HENT: Negative for hearing loss, odynophagia and sore throat.    Cardiovascular: Negative for chest pain, dyspnea on exertion, leg swelling, orthopnea and palpitations.   Respiratory: Negative for cough and hemoptysis.    Endocrine: Negative for cold intolerance, heat intolerance, polydipsia, polyphagia and polyuria.   Hematologic/Lymphatic: Does not bruise/bleed easily.   Skin: Negative for itching and rash.   Musculoskeletal: Negative for joint pain, joint swelling and myalgias.   Gastrointestinal: Negative for abdominal pain, constipation, diarrhea, hematemesis, hematochezia, melena, nausea and vomiting.   Genitourinary: Negative for dysuria, frequency and hematuria.   Neurological: Negative for focal weakness, headaches, numbness and seizures.   Psychiatric/Behavioral: Negative for suicidal ideas.   All other systems reviewed and are negative.    Vitals:    10/06/22 1248   BP: 128/74   BP Location: Left arm   Patient Position: Sitting   Pulse: 64   Temp: 97.8 °F (36.6 °C)   SpO2: 98%   Height: 167.6 cm (66\")      Physical Exam   General no acute distress, pleasant, interactive  Head normocephalic, atraumatic  Eyes clear sclera  ENT no discharge, neck supple  Mouth mucous membranes moist  Cardiac regular rate rhythm, no murmurs rubs gallops  Vascular warm well perfused x4 extremities  Pulmonary lungs clear to auscultation bilaterally  Abdomen soft nontender nondistended  Lymphatic no edema bilateral lower extremities  Neurological grossly intact  Psychological appropriate  Dermatological no lesions in sun exposed areas    The ROS, past medical history, surgical history, family history, social history and vitals were reviewed by myself and corrected as needed.      Labs/Imaging:  There is no new imaging to review today    Assessment/Plan: 32-year-old woman with a history of POTS and COVID-19 infection and perhaps family history of clotting disorder who presented in " early September 2022 with new bilateral pulmonary embolism and symptomatic extensive caval and left iliofemoral DVT causing painful and profound left lower extremity edema now status post thrombolysis, percutaneous mechanical thrombectomy of left iliofemoral and caval DVT using Penumbra and Inari devices, and placement of 20 x 40 mm Wallstent at site of left common iliac vein compression for May Thurner syndrome.  The patient reports being quite symptomatic in the chest with shortness of breath and tachycardia with exercise, however her left lower extremity symptoms have completely resolved and she is very grateful for this.  She is accompanied today by her mother who is also very grateful.  I agree with repeat CT angiogram of chest to reassess her thrombotic burden of the pulmonary arteries as well as repeat echocardiogram to assess for right heart strain.  I also agree with lower extremity venous duplex as a means of monitoring her left common iliac vein stent.  She should have additional hypercoagulability work-up completed by hematology as well.    Patient Active Problem List   Diagnosis   • Gastroesophageal reflux disease without esophagitis   • Mild episode of recurrent major depressive disorder (HCC)   • Acute pulmonary embolism without acute cor pulmonale (HCC)   • Deep vein thrombosis (DVT) associated with COVID-19   • Examination for normal comparison for clinical research   • Depression       Thank you for allowing to participate in the care of this patient.    Cooper Zhao M.D.   Cardiothoracic and Vascular Surgeon  [facility]

## 2022-10-07 LAB
APTT SCREEN TO CONFIRM RATIO: 1.18 RATIO (ref 0–1.34)
B2 GLYCOPROT1 IGA SER-ACNC: <9 GPI IGA UNITS (ref 0–25)
B2 GLYCOPROT1 IGG SER-ACNC: <9 GPI IGG UNITS (ref 0–20)
B2 GLYCOPROT1 IGM SER-ACNC: <9 GPI IGM UNITS (ref 0–32)
CONFIRM APTT/NORMAL: 37.3 SEC (ref 0–47.6)
DRVVT SCREEN TO CONFIRM RATIO: 1 RATIO (ref 0.8–1.2)
LA 2 SCREEN W REFLEX-IMP: ABNORMAL
MIXING DRVVT: 46.2 SEC (ref 0–40.4)
PROT S ACT/NOR PPP: 83 % (ref 63–140)
SCREEN APTT: 30.5 SEC (ref 0–51.9)
SCREEN DRVVT: 50.6 SEC (ref 0–47)
THROMBIN TIME: 16.9 SEC (ref 0–23)

## 2022-10-07 NOTE — TELEPHONE ENCOUNTER
Caller: Urszula Gonzalez    Relationship: Self    Best call back number: [phone]    Requested Prescriptions:   Requested Prescriptions     Pending Prescriptions Disp Refills   • apixaban (ELIQUIS) 5 MG tablet tablet 60 tablet 1     Sig: Take 1 tablet by mouth Every 12 (Twelve) Hours. Indications: DVT/PE (active thrombosis)        Pharmacy where request should be sent: [id number] [address] [phone] [facility] [phone] FX     Does the patient have less than a 3 day supply:  [x] Yes  [] No    Jim Ramirez Rep   10/07/22 12:06 EDT

## 2022-10-10 NOTE — ANESTHESIA POSTPROCEDURE EVALUATION
Patient: Urszula Gonzalez    Procedure Summary     Date: 09/10/22 Room / Location: [facility] OR 01 /  [facility]    Anesthesia Start: 0955 Anesthesia Stop: 1304    Procedure: ILEOFEMORAL EMBOLECTOMY, VENOGRAM (Left Thigh) Diagnosis:     Surgeons: Cooper Zhao MD Provider: Ceci Will MD    Anesthesia Type: general ASA Status: 3          Anesthesia Type: general    Vitals  Vitals Value Taken Time   /91 09/10/22 1515   Temp     Pulse 71 09/10/22 1519   Resp     SpO2 100 % 09/10/22 1519   Vitals shown include unvalidated device data.          Post Anesthesia Care and Evaluation    Patient location during evaluation: ICU  Patient participation: complete - patient participated  Level of consciousness: awake and alert  Pain management: adequate    Airway patency: patent  Anesthetic complications: No anesthetic complications  PONV Status: none  Cardiovascular status: hemodynamically stable and acceptable  Respiratory status: nonlabored ventilation, acceptable and nasal cannula  Hydration status: acceptable       · Continue daily prednisone 5 mg  · Continue hydroxychloroquine 400 mg HS  · voltaren gel and supportive care

## 2022-10-14 LAB
PROT C AG ACT/NOR PPP IA: 76 % (ref 60–150)
PROT S AG ACT/NOR PPP IA: 111 % (ref 60–150)
PROT S FREE AG ACT/NOR PPP IA: 124 % (ref 61–136)

## 2022-10-27 ENCOUNTER — APPOINTMENT (OUTPATIENT)
Dept: CARDIOLOGY | Facility: HOSPITAL | Age: 32
End: 2022-10-27

## 2022-10-31 ENCOUNTER — TELEPHONE (OUTPATIENT)
Dept: CARDIAC SURGERY | Facility: CLINIC | Age: 32
End: 2022-10-31

## 2022-10-31 NOTE — TELEPHONE ENCOUNTER
"Patient and patient spouse, Gurvinder, called requesting to speak with Dr. Zhao. They have several questions about signs and symptoms of PE/DVT. Patient thinks she is experiencing anxiety/panic attacks but is unsure and would like to speak with Dr. Zhao to discuss.     Phone: [phone]      --    I called the patient and spoke to her directly.  She reports having a \"full-blown panic attack\" without fever, chest pain, chest pressure, chest discomfort, difficulty breathing, lower extremity edema, lower extremity pressure, lower extremity discomfort, or any other new or concerning symptom.  She recently canceled her scheduled echocardiogram which was to be completed a few days ago.  I advised her to continue taking her anticoagulation which she reports she has been able to do faithfully without interruption.  I advised her to continue taking all of her prescribed medications and complete all of her recommended investigations.  We will have her complete echocardiogram, CT, and lower extremity venous duplex for her routine follow-up in the near future.  I advised the patient to call the office or 911 if there are any new or concerning symptoms.    Cooper Zhao M.D., R.P.V.I.  Cardiothoracic and Vascular Surgeon  [facility]    "

## 2022-11-01 ENCOUNTER — APPOINTMENT (OUTPATIENT)
Dept: GENERAL RADIOLOGY | Facility: HOSPITAL | Age: 32
End: 2022-11-01

## 2022-11-01 ENCOUNTER — HOSPITAL ENCOUNTER (EMERGENCY)
Facility: HOSPITAL | Age: 32
Discharge: HOME | End: 2022-11-02
Attending: EMERGENCY MEDICINE | Admitting: EMERGENCY MEDICINE

## 2022-11-01 DIAGNOSIS — R00.2 PALPITATIONS: ICD-10-CM

## 2022-11-01 DIAGNOSIS — R42 LIGHTHEADEDNESS: Primary | ICD-10-CM

## 2022-11-01 DIAGNOSIS — R10.30 INTERMITTENT LOWER ABDOMINAL PAIN: ICD-10-CM

## 2022-11-01 DIAGNOSIS — Z86.711 HISTORY OF PULMONARY EMBOLISM: ICD-10-CM

## 2022-11-01 LAB
ALBUMIN SERPL-MCNC: 3.9 G/DL (ref 3.5–5.2)
ALBUMIN/GLOB SERPL: 1.1 G/DL
ALP SERPL-CCNC: 70 U/L (ref 39–117)
ALT SERPL W P-5'-P-CCNC: 18 U/L (ref 1–33)
ANION GAP SERPL CALCULATED.3IONS-SCNC: 13 MMOL/L (ref 5–15)
AST SERPL-CCNC: 25 U/L (ref 1–32)
BASOPHILS # BLD AUTO: 0.06 10*3/MM3 (ref 0–0.2)
BASOPHILS NFR BLD AUTO: 0.6 % (ref 0–1.5)
BILIRUB SERPL-MCNC: 0.2 MG/DL (ref 0–1.2)
BUN SERPL-MCNC: 6 MG/DL (ref 6–20)
BUN/CREAT SERPL: 8.2 (ref 7–25)
CALCIUM SPEC-SCNC: 8.9 MG/DL (ref 8.6–10.5)
CHLORIDE SERPL-SCNC: 110 MMOL/L (ref 98–107)
CO2 SERPL-SCNC: 18 MMOL/L (ref 22–29)
CREAT SERPL-MCNC: 0.73 MG/DL (ref 0.57–1)
D DIMER PPP FEU-MCNC: <0.27 MCGFEU/ML (ref 0.01–0.5)
DEPRECATED RDW RBC AUTO: 45.1 FL (ref 37–54)
EGFRCR SERPLBLD CKD-EPI 2021: 112.9 ML/MIN/1.73
EOSINOPHIL # BLD AUTO: 0.08 10*3/MM3 (ref 0–0.4)
EOSINOPHIL NFR BLD AUTO: 0.8 % (ref 0.3–6.2)
ERYTHROCYTE [DISTWIDTH] IN BLOOD BY AUTOMATED COUNT: 16 % (ref 12.3–15.4)
GLOBULIN UR ELPH-MCNC: 3.4 GM/DL
GLUCOSE SERPL-MCNC: 101 MG/DL (ref 65–99)
HCG INTACT+B SERPL-ACNC: <0.1 MIU/ML
HCT VFR BLD AUTO: 34.7 % (ref 34–46.6)
HGB BLD-MCNC: 10.3 G/DL (ref 12–15.9)
HOLD SPECIMEN: NORMAL
HOLD SPECIMEN: NORMAL
IMM GRANULOCYTES # BLD AUTO: 0.02 10*3/MM3 (ref 0–0.05)
IMM GRANULOCYTES NFR BLD AUTO: 0.2 % (ref 0–0.5)
LYMPHOCYTES # BLD AUTO: 3.79 10*3/MM3 (ref 0.7–3.1)
LYMPHOCYTES NFR BLD AUTO: 36.7 % (ref 19.6–45.3)
MAGNESIUM SERPL-MCNC: 1.8 MG/DL (ref 1.6–2.6)
MCH RBC QN AUTO: 23.3 PG (ref 26.6–33)
MCHC RBC AUTO-ENTMCNC: 29.7 G/DL (ref 31.5–35.7)
MCV RBC AUTO: 78.5 FL (ref 79–97)
MONOCYTES # BLD AUTO: 1.05 10*3/MM3 (ref 0.1–0.9)
MONOCYTES NFR BLD AUTO: 10.2 % (ref 5–12)
NEUTROPHILS NFR BLD AUTO: 5.34 10*3/MM3 (ref 1.7–7)
NEUTROPHILS NFR BLD AUTO: 51.5 % (ref 42.7–76)
NRBC BLD AUTO-RTO: 0 /100 WBC (ref 0–0.2)
NT-PROBNP SERPL-MCNC: 107.6 PG/ML (ref 0–450)
PLATELET # BLD AUTO: 266 10*3/MM3 (ref 140–450)
PMV BLD AUTO: 9.5 FL (ref 6–12)
POTASSIUM SERPL-SCNC: 4.1 MMOL/L (ref 3.5–5.2)
PROT SERPL-MCNC: 7.3 G/DL (ref 6–8.5)
QT INTERVAL: 340 MS
QTC INTERVAL: 425 MS
RBC # BLD AUTO: 4.42 10*6/MM3 (ref 3.77–5.28)
SODIUM SERPL-SCNC: 141 MMOL/L (ref 136–145)
TROPONIN T SERPL-MCNC: <0.01 NG/ML (ref 0–0.03)
TSH SERPL DL<=0.05 MIU/L-ACNC: 3.5 UIU/ML (ref 0.27–4.2)
WBC NRBC COR # BLD: 10.34 10*3/MM3 (ref 3.4–10.8)
WHOLE BLOOD HOLD COAG: NORMAL
WHOLE BLOOD HOLD SPECIMEN: NORMAL

## 2022-11-01 PROCEDURE — 93005 ELECTROCARDIOGRAM TRACING: CPT

## 2022-11-01 PROCEDURE — 85379 FIBRIN DEGRADATION QUANT: CPT | Performed by: EMERGENCY MEDICINE

## 2022-11-01 PROCEDURE — 99284 EMERGENCY DEPT VISIT MOD MDM: CPT

## 2022-11-01 PROCEDURE — 93005 ELECTROCARDIOGRAM TRACING: CPT | Performed by: EMERGENCY MEDICINE

## 2022-11-01 PROCEDURE — 36415 COLL VENOUS BLD VENIPUNCTURE: CPT

## 2022-11-01 PROCEDURE — 83880 ASSAY OF NATRIURETIC PEPTIDE: CPT | Performed by: EMERGENCY MEDICINE

## 2022-11-01 PROCEDURE — 84484 ASSAY OF TROPONIN QUANT: CPT | Performed by: EMERGENCY MEDICINE

## 2022-11-01 PROCEDURE — 71045 X-RAY EXAM CHEST 1 VIEW: CPT

## 2022-11-01 PROCEDURE — 80050 GENERAL HEALTH PANEL: CPT | Performed by: EMERGENCY MEDICINE

## 2022-11-01 PROCEDURE — 84702 CHORIONIC GONADOTROPIN TEST: CPT | Performed by: EMERGENCY MEDICINE

## 2022-11-01 PROCEDURE — 83735 ASSAY OF MAGNESIUM: CPT | Performed by: EMERGENCY MEDICINE

## 2022-11-01 RX ORDER — SODIUM CHLORIDE 0.9 % (FLUSH) 0.9 %
10 SYRINGE (ML) INJECTION AS NEEDED
Status: DISCONTINUED | OUTPATIENT
Start: 2022-11-01 | End: 2022-11-02 | Stop reason: HOSPADM

## 2022-11-02 VITALS
RESPIRATION RATE: 19 BRPM | WEIGHT: 250 LBS | OXYGEN SATURATION: 99 % | DIASTOLIC BLOOD PRESSURE: 73 MMHG | HEART RATE: 80 BPM | SYSTOLIC BLOOD PRESSURE: 117 MMHG | BODY MASS INDEX: 40.18 KG/M2 | HEIGHT: 66 IN | TEMPERATURE: 98.1 F

## 2022-11-02 LAB — HOLD SPECIMEN: NORMAL

## 2022-11-02 NOTE — ED PROVIDER NOTES
[place]    EMERGENCY DEPARTMENT ENCOUNTER      Pt Name: Urszula Gonzalez  MRN: [id number]  YOB: 1990  Date of evaluation: 11/1/2022  Provider: Casey Sam MD    CHIEF COMPLAINT       Chief Complaint   Patient presents with   • Rapid Heart Rate         HISTORY OF PRESENT ILLNESS  (Location/Symptom, Timing/Onset, Context/Setting, Quality, Duration, Modifying Factors, Severity.)   Urszula Gonzalez is a 32 y.o. female who presents to the emergency department with intermittent episodes of lightheadedness over the course of the past couple of weeks.  Patient states that when she stands up quickly or she moves too quickly she feels that her heart begins to race and she becomes lightheaded for several seconds.  This is not associated with any headache, chest pain, or shortness of breath.  She does have some flushing.  She also notes that intermittently over the course the past couple weeks she has had some sharp stabbing pain that last for several seconds in her lower abdomen and then resolves.  She is not currently having any pain.  She does have prior history of pulmonary embolism and is on Eliquis with which she endorses compliance.      Nursing notes were reviewed.    REVIEW OF SYSTEMS    (2-9 systems for level 4, 10 or more for level 5)   ROS:  General:  No fevers, no chills, no weakness  Cardiovascular:  No chest pain, no palpitations  Respiratory:  No shortness of breath, no cough, no wheezing  Gastrointestinal:  Abdominal pain  Musculoskeletal:  No muscle pain, no joint pain  Skin:  No rash  Neurologic:  No speech problems, no headache, no extremity numbness, no extremity tingling, no extremity weakness  Psychiatric:  No anxiety  Genitourinary:  No dysuria, no hematuria    Except as noted above the remainder of the review of systems was reviewed and negative.       PAST MEDICAL HISTORY     Past Medical History:   Diagnosis Date   • Anemia    • Anxiety    • Bradycardia, unspecified     • Depression    • DVT (deep venous thrombosis) (HCC)     Left Lower Extremity.   • Dysfunctional gallbladder    • GERD (gastroesophageal reflux disease)    • Gestational hypertension    • H. pylori infection    • Hypertension    • Hyponatremia    • Ovarian cyst    • Pulmonary emboli (HCC)    • Tachycardia          SURGICAL HISTORY       Past Surgical History:   Procedure Laterality Date   •  SECTION  2018   •  SECTION PRIMARY  2016   •  SECTION WITH TUBAL Bilateral 2021    Procedure:  SECTION REPEAT WITH TUBAL;  Surgeon: Vinayak Gutierrez MD;  Location: [facility] LABOR DELIVERY;  Service: Obstetrics/Gynecology;  Laterality: Bilateral;   • CHOLECYSTECTOMY N/A 3/13/2020    Procedure: CHOLECYSTECTOMY LAPAROSCOPIC;  Surgeon: Toby Murphy MD;  Location: [facility] OR;  Service: General;  Laterality: N/A;   • COLONOSCOPY     • ENDOSCOPY     • FEMORAL THROMBECTOMY/EMBOLECTOMY Left 2022    Procedure: venogram, initiation of TPA at left leg and IVC;  Surgeon: Cooper Zhao MD;  Location: [facility];  Service: Vascular;  Laterality: Left;   • FEMORAL THROMBECTOMY/EMBOLECTOMY Left 9/10/2022    Procedure: ILEOFEMORAL EMBOLECTOMY, VENOGRAM;  Surgeon: Cooper Zhao MD;  Location: [facility];  Service: Vascular;  Laterality: Left;  FLUORO: 18M48S  DOSE: 2900 MGY  CONTRAST: 70 ML   • FEMORAL THROMBECTOMY/EMBOLECTOMY Left 2022    Procedure: ULTRASOUND GUIDED BILATERAL FEMORAL VEIN ACCESS, LEFT ILIAC THROMBECTOMY WITH INARI, VENOGRAPHY, AND PLACEMENT OF 20X40 WALL STENT;  Surgeon: Cooper Zhao MD;  Location: [facility];  Service: Vascular;  Laterality: Left;  fl- 10 MIN  6 SEC  dose- 582 MGY  contrast- 80 ML      • GASTRIC SLEEVE LAPAROSCOPIC     • TONSILLECTOMY     • WISDOM TOOTH EXTRACTION           CURRENT MEDICATIONS       Current Facility-Administered Medications:   •  sodium chloride 0.9 % flush 10 mL, 10 mL, Intravenous, PRN,  Casey Sam MD    Current Outpatient Medications:   •  apixaban (ELIQUIS) 5 MG tablet tablet, Take 1 tablet by mouth Every 12 (Twelve) Hours. Indications: DVT/PE (active thrombosis), Disp: 60 tablet, Rfl: 1  •  aspirin 81 MG EC tablet, Take 1 tablet by mouth Daily., Disp: 30 tablet, Rfl: 5  •  FLUoxetine (PROzac) 40 MG capsule, , Disp: , Rfl:   •  HYDROcodone-acetaminophen (NORCO) 5-325 MG per tablet, Take 2 tablets by mouth Every 4 (Four) Hours As Needed for Moderate Pain., Disp: 15 tablet, Rfl: 0  •  hydrOXYzine (ATARAX) 25 MG tablet, TAKE 2 TABLETS BY MOUTH EVERY 6 HOURS AS NEEDED FOR ANXIETY, Disp: , Rfl:   •  pantoprazole (PROTONIX) 40 MG EC tablet, Take 40 mg by mouth Daily., Disp: , Rfl:     ALLERGIES     Ciprofloxacin    FAMILY HISTORY       Family History   Problem Relation Age of Onset   • Thyroid disease Mother    • Graves' disease Mother    • Myasthenia gravis Mother    • Hypertension Father    • No Known Problems Sister    • Hypertension Brother    • Hypertension Brother    • Diabetes Maternal Grandmother    • Cirrhosis Maternal Grandmother    • Cancer Maternal Grandmother    • No Known Problems Paternal Grandmother    • Cancer Paternal Grandfather    • No Known Problems Daughter    • No Known Problems Son           SOCIAL HISTORY       Social History     Socioeconomic History   • Marital status:      Spouse name: nate gary   • Number of children: 2   • Highest education level: High school graduate   Tobacco Use   • Smoking status: Never   • Smokeless tobacco: Never   Vaping Use   • Vaping Use: Never used   Substance and Sexual Activity   • Alcohol use: Not Currently     Comment: social   • Drug use: Never   • Sexual activity: Defer     Partners: Male     Birth control/protection: None, Surgical         PHYSICAL EXAM    (up to 7 for level 4, 8 or more for level 5)     Vitals:    11/01/22 2142 11/01/22 2230   BP: (!) 137/105 122/80   BP Location: Right arm    Patient Position: Sitting  "   Pulse: 97 75   Resp: 18 15   Temp: 98.1 °F (36.7 °C)    TempSrc: Oral    SpO2: 99% 98%   Weight: 113 kg (250 lb)    Height: 167.6 cm (66\")        Physical Exam  General: Awake, alert, no acute distress.  HEENT: Conjunctivae normal.  Neck: Trachea midline.  Cardiac: Heart regular rate, rhythm, no murmurs, rubs, or gallops  Lungs: Lungs are clear to auscultation, there is no wheezing, rhonchi, or rales. There is no use of accessory muscles.  Chest wall: There is no tenderness to palpation over the chest wall or over ribs  Abdomen: Abdomen is soft, nontender, nondistended. There are no firm or pulsatile masses, no rebound rigidity or guarding.   Musculoskeletal: No deformity.  Neuro: Alert and oriented x 4.  Dermatology: Skin is warm and dry  Psych: Mentation is grossly normal, cognition is grossly normal. Affect is appropriate.      DIAGNOSTIC RESULTS     EKG: All EKGs are interpreted by the Emergency Department Physician who either signs or Co-signs this chart in the absence of a cardiologist.    ECG 12 Lead ED Triage Standing Order; Dysrhythmia   Final Result   Test Reason : ED Triage Standing Order~   Blood Pressure :   */*   mmHG   Vent. Rate :  94 BPM     Atrial Rate :  94 BPM      P-R Int : 160 ms          QRS Dur :  84 ms       QT Int : 340 ms       P-R-T Axes :  28  34  15 degrees      QTc Int : 425 ms      Normal sinus rhythm   Cannot rule out Anterior infarct (cited on or before 12-SEP-2022)   Abnormal ECG   When compared with ECG of 12-SEP-2022 18:39,   No significant change was found   Confirmed by CHARLY CULP ([id number]) on 11/1/2022 11:35:33 PM      Referred By: CONNOR           Confirmed By: CHARLY CULP          RADIOLOGY:   Non-plain film images such as CT, Ultrasound and MRI are read by the radiologist. Plain radiographic images are visualized and preliminarily interpreted by the emergency physician with the below findings:      [x] Radiologist's Report Reviewed:  XR Chest 1 View   Final Result "   No new chest disease.       This report was finalized on 11/1/2022 11:23 PM by Dr. Ravindra Rinaldi MD.                ED BEDSIDE ULTRASOUND:   Performed by ED Physician - none    LABS:    I have reviewed and interpreted all of the currently available lab results from this visit (if applicable):  Results for orders placed or performed during the hospital encounter of 11/01/22   Comprehensive Metabolic Panel    Specimen: Blood   Result Value Ref Range    Glucose 101 (H) 65 - 99 mg/dL    BUN 6 6 - 20 mg/dL    Creatinine 0.73 0.57 - 1.00 mg/dL    Sodium 141 136 - 145 mmol/L    Potassium 4.1 3.5 - 5.2 mmol/L    Chloride 110 (H) 98 - 107 mmol/L    CO2 18.0 (L) 22.0 - 29.0 mmol/L    Calcium 8.9 8.6 - 10.5 mg/dL    Total Protein 7.3 6.0 - 8.5 g/dL    Albumin 3.90 3.50 - 5.20 g/dL    ALT (SGPT) 18 1 - 33 U/L    AST (SGOT) 25 1 - 32 U/L    Alkaline Phosphatase 70 39 - 117 U/L    Total Bilirubin 0.2 0.0 - 1.2 mg/dL    Globulin 3.4 gm/dL    A/G Ratio 1.1 g/dL    BUN/Creatinine Ratio 8.2 7.0 - 25.0    Anion Gap 13.0 5.0 - 15.0 mmol/L    eGFR 112.9 >60.0 mL/min/1.73   Magnesium    Specimen: Blood   Result Value Ref Range    Magnesium 1.8 1.6 - 2.6 mg/dL   Troponin    Specimen: Blood   Result Value Ref Range    Troponin T <0.010 0.000 - 0.030 ng/mL   TSH    Specimen: Blood   Result Value Ref Range    TSH 3.500 0.270 - 4.200 uIU/mL   BNP    Specimen: Blood   Result Value Ref Range    proBNP 107.6 0.0 - 450.0 pg/mL   CBC Auto Differential    Specimen: Blood   Result Value Ref Range    WBC 10.34 3.40 - 10.80 10*3/mm3    RBC 4.42 3.77 - 5.28 10*6/mm3    Hemoglobin 10.3 (L) 12.0 - 15.9 g/dL    Hematocrit 34.7 34.0 - 46.6 %    MCV 78.5 (L) 79.0 - 97.0 fL    MCH 23.3 (L) 26.6 - 33.0 pg    MCHC 29.7 (L) 31.5 - 35.7 g/dL    RDW 16.0 (H) 12.3 - 15.4 %    RDW-SD 45.1 37.0 - 54.0 fl    MPV 9.5 6.0 - 12.0 fL    Platelets 266 140 - 450 10*3/mm3    Neutrophil % 51.5 42.7 - 76.0 %    Lymphocyte % 36.7 19.6 - 45.3 %    Monocyte % 10.2 5.0 - 12.0 %     "Eosinophil % 0.8 0.3 - 6.2 %    Basophil % 0.6 0.0 - 1.5 %    Immature Grans % 0.2 0.0 - 0.5 %    Neutrophils, Absolute 5.34 1.70 - 7.00 10*3/mm3    Lymphocytes, Absolute 3.79 (H) 0.70 - 3.10 10*3/mm3    Monocytes, Absolute 1.05 (H) 0.10 - 0.90 10*3/mm3    Eosinophils, Absolute 0.08 0.00 - 0.40 10*3/mm3    Basophils, Absolute 0.06 0.00 - 0.20 10*3/mm3    Immature Grans, Absolute 0.02 0.00 - 0.05 10*3/mm3    nRBC 0.0 0.0 - 0.2 /100 WBC   D-dimer, Quantitative    Specimen: Blood   Result Value Ref Range    D-Dimer, Quantitative <0.27 0.01 - 0.50 MCGFEU/mL   hCG, Quantitative, Pregnancy    Specimen: Blood   Result Value Ref Range    HCG Quantitative <0.10 mIU/mL   ECG 12 Lead ED Triage Standing Order; Dysrhythmia   Result Value Ref Range    QT Interval 340 ms    QTC Interval 425 ms   Green Top (Gel)   Result Value Ref Range    Extra Tube Hold for add-ons.    Lavender Top   Result Value Ref Range    Extra Tube hold for add-on    Gold Top - SST   Result Value Ref Range    Extra Tube Hold for add-ons.    Light Blue Top   Result Value Ref Range    Extra Tube Hold for add-ons.         All other labs were within normal range or not returned as of this dictation.      EMERGENCY DEPARTMENT COURSE and DIFFERENTIAL DIAGNOSIS/MDM:   Vitals:    Vitals:    11/01/22 2142 11/01/22 2230   BP: (!) 137/105 122/80   BP Location: Right arm    Patient Position: Sitting    Pulse: 97 75   Resp: 18 15   Temp: 98.1 °F (36.7 °C)    TempSrc: Oral    SpO2: 99% 98%   Weight: 113 kg (250 lb)    Height: 167.6 cm (66\")        ED Course as of 11/02/22 0031 Wed Nov 02, 2022   0029 On reexamination, the patient remains completely asymptomatic and vital signs remained within normal limits.  Her symptoms sound mostly positional in nature.  There is nothing in her history or exam that would raise concern for emergent neurovascular, cardiovascular, or hemorrhagic/hypovolemic process.  Clinically, she does not appear to be dehydrated.  She has no headache " or neurologic complaint.  She has had intermittent sharp shooting pain in her lower abdomen but is not currently having any pain and abdomen is completely soft and nontender and there is no suggestion of underlying emergent intra-abdominal pathology.  She has no chest pain or shortness of breath and ECG demonstrates no evidence of dysrhythmia or ischemia.  She does have history of PE but is already on Eliquis and D-dimer was checked and was within normal limits.  There is no evidence of significant anemia, electrolyte derangement, or other acute process. [NS]      ED Course User Index  [NS] Casey Sam MD         I had a discussion with the patient/family regarding diagnosis, diagnostic results, treatment plan, and medications.  The patient/family indicated understanding of these instructions.  I spent adequate time at the bedside preceding discharge necessary to personally discuss the aftercare instructions, giving patient education, providing explanations of the results of our evaluations/findings, and my decision making to assure that the patient/family understand the plan of care.  Time was allotted to answer questions at that time and throughout the ED course.  Emphasis was placed on timely follow-up after discharge.  I also discussed the potential for the development of an acute emergent condition requiring further evaluation, admission, or even surgical intervention. I discussed that we found nothing during the visit today indicating the need for further workup, admission, or the presence of an unstable medical condition.  I encouraged the patient to return to the emergency department immediately for ANY concerns, worsening, new complaints, or if symptoms persist and unable to seek follow-up in a timely fashion.  The patient/family expressed understanding and agreement with this plan.  The patient will follow-up with their PCP in 1-2 days for reevaluation.       MEDICATIONS ADMINISTERED IN  ED:  Medications   sodium chloride 0.9 % flush 10 mL (has no administration in time range)       PROCEDURES:  Procedures    CRITICAL CARE TIME    Total Critical Care time was 0 minutes, excluding separately reportable procedures.   There was a high probability of clinically significant/life threatening deterioration in the patient's condition which required my urgent intervention.      FINAL IMPRESSION      1. Lightheadedness    2. Palpitations    3. Intermittent lower abdominal pain    4. History of pulmonary embolism          DISPOSITION/PLAN     ED Disposition     ED Disposition   Discharge    Condition   Stable    Comment   --             PATIENT REFERRED TO:  Cha Fernández, APRN  [address] [place] [address] [phone]    Schedule an appointment as soon as possible for a visit in 2 days      [facility] Emergency Department  [address] [address] [phone]    If symptoms worsen      DISCHARGE MEDICATIONS:     Medication List      CONTINUE taking these medications    apixaban 5 MG tablet tablet  Commonly known as: ELIQUIS  Take 1 tablet by mouth Every 12 (Twelve) Hours. Indications: DVT/PE (active thrombosis)     aspirin 81 MG EC tablet  Take 1 tablet by mouth Daily.     FLUoxetine 40 MG capsule  Commonly known as: PROzac     HYDROcodone-acetaminophen 5-325 MG per tablet  Commonly known as: NORCO  Take 2 tablets by mouth Every 4 (Four) Hours As Needed for Moderate Pain.     hydrOXYzine 25 MG tablet  Commonly known as: ATARAX     pantoprazole 40 MG EC tablet  Commonly known as: PROTONIX                Comment: Please note this report has been produced using speech recognition software.      Casey Sam MD  Attending Emergency Physician               Casey Sam MD  11/02/22 0033

## 2022-11-08 ENCOUNTER — APPOINTMENT (OUTPATIENT)
Dept: CARDIOLOGY | Facility: HOSPITAL | Age: 32
End: 2022-11-08

## 2022-11-08 DIAGNOSIS — Z86.718 PERSONAL HISTORY OF VENOUS THROMBOSIS AND EMBOLISM: ICD-10-CM

## 2022-11-08 DIAGNOSIS — Z86.711 PERSONAL HISTORY OF PE (PULMONARY EMBOLISM): ICD-10-CM

## 2022-11-08 DIAGNOSIS — R00.2 PALPITATIONS: Primary | ICD-10-CM

## 2022-11-14 ENCOUNTER — HOSPITAL ENCOUNTER (OUTPATIENT)
Dept: CARDIOLOGY | Facility: HOSPITAL | Age: 32
Discharge: HOME | End: 2022-11-14
Admitting: NURSE PRACTITIONER

## 2022-11-14 VITALS — BODY MASS INDEX: 40.04 KG/M2 | WEIGHT: 249.12 LBS | HEIGHT: 66 IN

## 2022-11-14 DIAGNOSIS — R00.2 PALPITATIONS: ICD-10-CM

## 2022-11-14 DIAGNOSIS — Z86.718 PERSONAL HISTORY OF VENOUS THROMBOSIS AND EMBOLISM: ICD-10-CM

## 2022-11-14 DIAGNOSIS — Z86.711 PERSONAL HISTORY OF PE (PULMONARY EMBOLISM): ICD-10-CM

## 2022-11-14 LAB
BH CV ECHO MEAS - AO MAX PG: 12.3 MMHG
BH CV ECHO MEAS - AO MEAN PG: 6 MMHG
BH CV ECHO MEAS - AO ROOT DIAM: 2.9 CM
BH CV ECHO MEAS - AO V2 MAX: 175 CM/SEC
BH CV ECHO MEAS - AO V2 VTI: 31.6 CM
BH CV ECHO MEAS - AVA(I,D): 1.96 CM2
BH CV ECHO MEAS - EDV(CUBED): 140.6 ML
BH CV ECHO MEAS - EDV(MOD-SP2): 64.5 ML
BH CV ECHO MEAS - EDV(MOD-SP4): 100 ML
BH CV ECHO MEAS - EF(MOD-BP): 58 %
BH CV ECHO MEAS - EF(MOD-SP2): 57.1 %
BH CV ECHO MEAS - EF(MOD-SP4): 57.2 %
BH CV ECHO MEAS - ESV(CUBED): 46.7 ML
BH CV ECHO MEAS - ESV(MOD-SP2): 27.7 ML
BH CV ECHO MEAS - ESV(MOD-SP4): 42.8 ML
BH CV ECHO MEAS - FS: 30.8 %
BH CV ECHO MEAS - IVS/LVPW: 0.73 CM
BH CV ECHO MEAS - IVSD: 0.8 CM
BH CV ECHO MEAS - LA DIMENSION: 3.8 CM
BH CV ECHO MEAS - LAT PEAK E' VEL: 17.1 CM/SEC
BH CV ECHO MEAS - LV DIASTOLIC VOL/BSA (35-75): 45.7 CM2
BH CV ECHO MEAS - LV MASS(C)D: 181.4 GRAMS
BH CV ECHO MEAS - LV MAX PG: 5.6 MMHG
BH CV ECHO MEAS - LV MEAN PG: 3 MMHG
BH CV ECHO MEAS - LV SYSTOLIC VOL/BSA (12-30): 19.5 CM2
BH CV ECHO MEAS - LV V1 MAX: 118 CM/SEC
BH CV ECHO MEAS - LV V1 VTI: 19.7 CM
BH CV ECHO MEAS - LVIDD: 5.2 CM
BH CV ECHO MEAS - LVIDS: 3.6 CM
BH CV ECHO MEAS - LVOT AREA: 3.1 CM2
BH CV ECHO MEAS - LVOT DIAM: 2 CM
BH CV ECHO MEAS - LVPWD: 1.1 CM
BH CV ECHO MEAS - MED PEAK E' VEL: 11.1 CM/SEC
BH CV ECHO MEAS - MV A MAX VEL: 83.8 CM/SEC
BH CV ECHO MEAS - MV DEC SLOPE: 868 CM/SEC2
BH CV ECHO MEAS - MV DEC TIME: 0.13 MSEC
BH CV ECHO MEAS - MV E MAX VEL: 112 CM/SEC
BH CV ECHO MEAS - MV E/A: 1.34
BH CV ECHO MEAS - MV MAX PG: 4.3 MMHG
BH CV ECHO MEAS - MV MEAN PG: 2 MMHG
BH CV ECHO MEAS - MV V2 VTI: 22.6 CM
BH CV ECHO MEAS - MVA(VTI): 2.7 CM2
BH CV ECHO MEAS - PA ACC TIME: 0.14 SEC
BH CV ECHO MEAS - PA PR(ACCEL): 14 MMHG
BH CV ECHO MEAS - PA V2 MAX: 112.5 CM/SEC
BH CV ECHO MEAS - SI(MOD-SP2): 16.8 ML/M2
BH CV ECHO MEAS - SI(MOD-SP4): 26.1 ML/M2
BH CV ECHO MEAS - SV(LVOT): 61.9 ML
BH CV ECHO MEAS - SV(MOD-SP2): 36.8 ML
BH CV ECHO MEAS - SV(MOD-SP4): 57.2 ML
BH CV ECHO MEAS - TAPSE (>1.6): 2.6 CM
BH CV ECHO MEASUREMENTS AVERAGE E/E' RATIO: 7.94
BH CV VAS BP LEFT ARM: NORMAL MMHG
BH CV XLRA - RV BASE: 2.7 CM
BH CV XLRA - RV LENGTH: 8 CM
BH CV XLRA - RV MID: 2.9 CM
BH CV XLRA - TDI S': 19.3 CM/SEC
IVRT: 69 MSEC
LEFT ATRIUM VOLUME INDEX: 10.6 ML/M2
MAXIMAL PREDICTED HEART RATE: 189 BPM
STRESS TARGET HR: 161 BPM

## 2022-11-14 PROCEDURE — 93306 TTE W/DOPPLER COMPLETE: CPT

## 2022-11-14 PROCEDURE — 93306 TTE W/DOPPLER COMPLETE: CPT | Performed by: INTERNAL MEDICINE

## 2022-12-02 ENCOUNTER — HOSPITAL ENCOUNTER (OUTPATIENT)
Dept: CT IMAGING | Facility: HOSPITAL | Age: 32
Discharge: HOME | End: 2022-12-02

## 2022-12-02 ENCOUNTER — HOSPITAL ENCOUNTER (OUTPATIENT)
Dept: CARDIOLOGY | Facility: HOSPITAL | Age: 32
Discharge: HOME | End: 2022-12-02

## 2022-12-02 VITALS — WEIGHT: 249 LBS | HEIGHT: 65 IN | BODY MASS INDEX: 41.48 KG/M2

## 2022-12-02 DIAGNOSIS — I26.99 OTHER ACUTE PULMONARY EMBOLISM WITHOUT ACUTE COR PULMONALE: ICD-10-CM

## 2022-12-02 DIAGNOSIS — I82.90 DEEP VEIN THROMBOSIS (DVT) ASSOCIATED WITH COVID-19: ICD-10-CM

## 2022-12-02 DIAGNOSIS — U07.1 DEEP VEIN THROMBOSIS (DVT) ASSOCIATED WITH COVID-19: ICD-10-CM

## 2022-12-02 LAB

## 2022-12-02 PROCEDURE — 93971 EXTREMITY STUDY: CPT | Performed by: INTERNAL MEDICINE

## 2022-12-02 PROCEDURE — 0 IOPAMIDOL PER 1 ML: Performed by: INTERNAL MEDICINE

## 2022-12-02 PROCEDURE — 71275 CT ANGIOGRAPHY CHEST: CPT

## 2022-12-02 PROCEDURE — 93971 EXTREMITY STUDY: CPT

## 2022-12-02 RX ADMIN — IOPAMIDOL 98 ML: 755 INJECTION, SOLUTION INTRAVENOUS at 10:57

## 2022-12-05 ENCOUNTER — OFFICE VISIT (OUTPATIENT)
Dept: CARDIOLOGY | Facility: CLINIC | Age: 32
End: 2022-12-05

## 2022-12-05 VITALS
HEIGHT: 65 IN | BODY MASS INDEX: 41.48 KG/M2 | DIASTOLIC BLOOD PRESSURE: 86 MMHG | WEIGHT: 249 LBS | HEART RATE: 90 BPM | OXYGEN SATURATION: 98 % | SYSTOLIC BLOOD PRESSURE: 134 MMHG

## 2022-12-05 DIAGNOSIS — I82.90 DEEP VEIN THROMBOSIS (DVT) ASSOCIATED WITH COVID-19: ICD-10-CM

## 2022-12-05 DIAGNOSIS — R42 LIGHTHEADEDNESS: ICD-10-CM

## 2022-12-05 DIAGNOSIS — U07.1 DEEP VEIN THROMBOSIS (DVT) ASSOCIATED WITH COVID-19: ICD-10-CM

## 2022-12-05 DIAGNOSIS — R00.2 PALPITATIONS: ICD-10-CM

## 2022-12-05 DIAGNOSIS — I26.99 OTHER ACUTE PULMONARY EMBOLISM WITHOUT ACUTE COR PULMONALE: Primary | ICD-10-CM

## 2022-12-05 DIAGNOSIS — R53.83 OTHER FATIGUE: ICD-10-CM

## 2022-12-05 PROCEDURE — 99214 OFFICE O/P EST MOD 30 MIN: CPT | Performed by: INTERNAL MEDICINE

## 2022-12-05 RX ORDER — LORAZEPAM 0.5 MG/1
TABLET ORAL
COMMUNITY
Start: 2022-11-03

## 2022-12-05 NOTE — PROGRESS NOTES
[facility] Cardiology   [address], [address]  [place], [place], [address] [phone]  [email or web address]           OUTPATIENT CLINIC FOLLOW UP NOTE    Patient Care Team:  Patient Care Team:  Cha Fernández APRN as PCP - General (Family Medicine)  Raymond Davey MD as Consulting Physician (Hematology and Oncology)  Rafiq Martinez MD as Consulting Physician (Cardiology)    Subjective:      Chief Complaint   Patient presents with   • Autonomic orthostatic hypotension       HPI:    Urszula Gonzalez is a 32 y.o. female.  Cardiac focused, problem list:  1. DVT/PE  a. Diagnosed with bilateral PE, LLE DVT, 2022  b. CT Abd/pelvis, 9/3/22 [facility]: acute DVT within the left common femoral vein, left external iliac vein, left common iliac vein extending into the infrarenal IVC and almost to the level of renal veins.  c. Localized thrombolysis catheter placed 2022  d. Attempted thrombectomy using CAT aspiration catheter with residual large thrombus burden, 9/10/2022  e. Left iliac thrombectomy and placement of stent, IVC mechanical thrombectomy, 2022  f. Infrarenal IVC filter placed, 2022  g. Echo 2022:  LV systolic function is normal. LVEF 56-60%. RV size and function within normal limits. Cardiac valves structurally and functionally within normal limits.   h. Repeat CTA chest 2022:  Small clot burden extending from the distal right main pulmonary artery, nonocclusive.   2. POTS during second pregnancy  1. Holter monitor 3/18/2021:  No significant arrhyhtmia. Events were NSR or sinus tach.   3. Orthostatic tachycardia and orthostatic hypotension  4. Hypertension  5. Gestational hypertension  6. GERD  7. Ovarian cyst  8. Depression  9. Surgical Hx:   a. Tonsillectomy  b. Cholecystectomy  c.  x 3  d. Tubal ligation  e. Gastric sleeve    Patient presents today for follow up. Since her hospitalization, she has had fatigue, palpitations,  "lightheadedness and dyspnea on exertion.  She notes these symptoms are similar to the symptoms she had during her pregnancy when diagnosed with POTS.  She is compliant with apixaban, not missing any doses. Denies chest pain, lower extremity edema or syncope.     Review of Systems:  As noted above in the HPI     PFSH:  Patient Active Problem List   Diagnosis   • Gastroesophageal reflux disease without esophagitis   • Mild episode of recurrent major depressive disorder (HCC)   • Acute pulmonary embolism without acute cor pulmonale (HCC)   • Deep vein thrombosis (DVT) associated with COVID-19   • Examination for normal comparison for clinical research   • Depression         Current Outpatient Medications:   •  apixaban (ELIQUIS) 5 MG tablet tablet, Take 1 tablet by mouth Every 12 (Twelve) Hours. Indications: DVT/PE (active thrombosis), Disp: 60 tablet, Rfl: 1  •  aspirin 81 MG EC tablet, Take 1 tablet by mouth Daily., Disp: 30 tablet, Rfl: 5  •  FLUoxetine (PROzac) 40 MG capsule, , Disp: , Rfl:   •  hydrOXYzine (ATARAX) 25 MG tablet, TAKE 2 TABLETS BY MOUTH EVERY 6 HOURS AS NEEDED FOR ANXIETY, Disp: , Rfl:   •  LORazepam (ATIVAN) 0.5 MG tablet, , Disp: , Rfl:   •  pantoprazole (PROTONIX) 40 MG EC tablet, Take 40 mg by mouth Daily., Disp: , Rfl:   •  HYDROcodone-acetaminophen (NORCO) 5-325 MG per tablet, Take 2 tablets by mouth Every 4 (Four) Hours As Needed for Moderate Pain., Disp: 15 tablet, Rfl: 0     reports that she has never smoked. She has never used smokeless tobacco.      Objective:   Physical exam:  /86 (BP Location: Right arm, Patient Position: Sitting)   Pulse 90   Ht 165.1 cm (65\")   Wt 113 kg (249 lb)   SpO2 98%   BMI 41.44 kg/m²   CONSTITUTIONAL: No acute distress  RESPIRATORY: Normal effort. Clear to auscultation bilaterally without wheezing or rales  CARDIOVASCULAR: Carotids with normal upstrokes without bruits.  Regular rate and rhythm with normal S1 and S2. Without murmur. Normal radial " pulse. There is no lower extremity edema bilaterally.    Labs:    No results found for: LDL  No components found for: LDLDIRECTC    Diagnostic Data:    Procedures    CTA chest 12/02/2022  · Small clot burden extending from the distal right main pulmonary artery. This is nonocclusive.   · Although no direct imaging is available this is likely significantly decreased given the description of the prior exam.   · Additionally, there is no clot burden currently visualized in the left pulmonary arterial system.     Holter monitor 3/18/2021  · No significant arrhythmia noted.  Events were NSR or sinus tach.     TTE 11/14/2022  •  Left ventricular systolic function is normal. Left ventricular ejection fraction appears to be 56 - 60%.  •  Right ventricular size and function within normal limits.  •  The cardiac valves are structurally and functionally within normal limits.    Lower extremity venous duplex, 12/02/2022  •  The left lower extremity venous duplex scan is negative for DVT and SVT.      Assessment and Plan:     Other acute pulmonary embolism without acute cor pulmonale  Deep vein thrombosis associated with COVID-19  -Follow up testing: Repeat CTA chest with only small clot burden extending from the distal right main pulmonary artery. Repeat venous duplex negative for DVT. Echo normal  -Continue apixaban; duration to be determined by Hem/Onc. Hypercoagulable workup pending.     Autonomic orthostatic hypotension and tachycardia  Palpitations  Other fatigue  Lightheadedness   -Symptom pattern similar to POTS symptoms she had during pregnancy.   -Encouraged patient to adequately hydrate and exercise as able to build cardiac fitness.   -Hold off restarting Florinef and salt tablets since her BP is normal  -Continue to monitor clinically. Consider repeat heart monitor if symptoms persist or worsen. Will consider a trial of Zebeta 2.5mg daily if with persistent tachycardia.  -Encouraged increased activity/exercise    -  Return in about 6 months (around 6/5/2023) for Next scheduled follow up.    Scribed for Rafiq Martinez MD by KERVIN Brantley. 12/5/2022  17:11 EST    I, Rafiq Martinez MD, personally performed the services as scribed by the above named individual. I have made any necessary edits and it is both accurate and complete.     Rafiq Martinez MD, MSc, FACC, [facility]  Interventional Cardiology

## 2022-12-13 ENCOUNTER — OFFICE VISIT (OUTPATIENT)
Dept: ONCOLOGY | Facility: CLINIC | Age: 32
End: 2022-12-13

## 2022-12-13 VITALS
HEIGHT: 65 IN | OXYGEN SATURATION: 100 % | HEART RATE: 86 BPM | DIASTOLIC BLOOD PRESSURE: 106 MMHG | BODY MASS INDEX: 41.44 KG/M2 | SYSTOLIC BLOOD PRESSURE: 172 MMHG | RESPIRATION RATE: 18 BRPM | TEMPERATURE: 97.5 F

## 2022-12-13 DIAGNOSIS — I26.99 OTHER ACUTE PULMONARY EMBOLISM WITHOUT ACUTE COR PULMONALE: Primary | ICD-10-CM

## 2022-12-13 PROCEDURE — 99214 OFFICE O/P EST MOD 30 MIN: CPT | Performed by: INTERNAL MEDICINE

## 2023-02-13 NOTE — TELEPHONE ENCOUNTER
Caller: Urszula Gonzalez    Relationship: Self    Best call back number: [phone]    Requested Prescriptions:   Requested Prescriptions     Pending Prescriptions Disp Refills   • apixaban (ELIQUIS) 5 MG tablet tablet 60 tablet 1     Sig: Take 1 tablet by mouth Every 12 (Twelve) Hours. Indications: DVT/PE (active thrombosis)        Pharmacy where request should be sent: [id number] [address] [phone] [facility] [phone] FX       Does the patient have less than a 3 day supply:  [x] Yes  [] No    Would you like a call back once the refill request has been completed: [x] Yes [] No    If the office needs to give you a call back, can they leave a voicemail: [x] Yes [] No

## 2023-02-16 ENCOUNTER — TELEPHONE (OUTPATIENT)
Dept: CARDIAC SURGERY | Facility: CLINIC | Age: 33
End: 2023-02-16

## 2023-03-07 ENCOUNTER — HOSPITAL ENCOUNTER (OUTPATIENT)
Dept: CT IMAGING | Facility: HOSPITAL | Age: 33
Discharge: HOME | End: 2023-03-07
Admitting: INTERNAL MEDICINE
Payer: COMMERCIAL

## 2023-03-07 DIAGNOSIS — I26.99 OTHER ACUTE PULMONARY EMBOLISM WITHOUT ACUTE COR PULMONALE: ICD-10-CM

## 2023-03-07 PROCEDURE — 25510000001 IOPAMIDOL PER 1 ML: Performed by: INTERNAL MEDICINE

## 2023-03-07 PROCEDURE — 71275 CT ANGIOGRAPHY CHEST: CPT

## 2023-03-07 RX ADMIN — IOPAMIDOL 85 ML: 755 INJECTION, SOLUTION INTRAVENOUS at 14:11

## 2023-03-14 ENCOUNTER — OFFICE VISIT (OUTPATIENT)
Dept: ONCOLOGY | Facility: CLINIC | Age: 33
End: 2023-03-14
Payer: COMMERCIAL

## 2023-03-14 VITALS
BODY MASS INDEX: 41.44 KG/M2 | TEMPERATURE: 98.1 F | HEART RATE: 92 BPM | DIASTOLIC BLOOD PRESSURE: 88 MMHG | HEIGHT: 65 IN | SYSTOLIC BLOOD PRESSURE: 149 MMHG | RESPIRATION RATE: 18 BRPM | OXYGEN SATURATION: 99 %

## 2023-03-14 DIAGNOSIS — I26.99 OTHER ACUTE PULMONARY EMBOLISM WITHOUT ACUTE COR PULMONALE: Primary | ICD-10-CM

## 2023-03-14 DIAGNOSIS — I82.90 DEEP VEIN THROMBOSIS (DVT) ASSOCIATED WITH COVID-19: ICD-10-CM

## 2023-03-14 DIAGNOSIS — U07.1 DEEP VEIN THROMBOSIS (DVT) ASSOCIATED WITH COVID-19: ICD-10-CM

## 2023-03-14 PROCEDURE — 1126F AMNT PAIN NOTED NONE PRSNT: CPT | Performed by: INTERNAL MEDICINE

## 2023-03-14 PROCEDURE — 99214 OFFICE O/P EST MOD 30 MIN: CPT | Performed by: INTERNAL MEDICINE

## 2023-03-14 RX ORDER — FLUOXETINE HYDROCHLORIDE 20 MG/1
CAPSULE ORAL
COMMUNITY
Start: 2023-01-13

## 2023-03-14 NOTE — PROGRESS NOTES
Follow Up Office Visit      Date: 2023     Patient Name: Urszula Gonzalez  MRN: [id number]  : 1990  Referring Physician: Hospital follow-up     Chief Complaint: Follow-up for COVID induced DVT/PE     History of Present Illness: Ms. Gonzalez is a 32-year-old lady with a past medical history of anxiety, GERD who presents to [facility] for extensive left lower extremity DVT and PE related to COVID-19.  Patient states that she was diagnosed with COVID-19 on 2022.  Notes that about 11 days later she started to notice left lower extremity cramping that did not significantly improve.  She was seen in the ED at [facility] and subsequently had a diagnosis of a left lower extremity DVT.  She was given tPA and admitted overnight.  She was discharged the next day with Eliquis.  She took 1 dose of the Eliquis and continued to have significant discomfort in her left lower extremity and tightness in her chest.  She was then readmitted with continued clot in her left lower extremity and significant pulmonary embolus with right heart strain.  She was then transferred to [facility] where she was seen by Dr. Zhao who performed direct thrombotic infusion as well as thrombectomy.  No intervention needed to her pulmonary embolus as she is hemodynamically stable and ambulating without difficulty.  She has never had a DVT or PE in the past.  Notes a grandmother with a history of blood clots.  Has had 3 children with no significant complications regarding miscarriages or DVT during pregnancy     Interval History:  Presents clinic for follow-up.  Remains compliant with her apixaban.  Denies any significant shortness of breath or chest pain.  Denies any lower extremity swelling.  Still having anxiety related to potentially having recurrent blood clot in the future    Oncology History:    Oncology/Hematology History    No history exists.       Subjective      Review of  Systems:   Constitutional: Negative for fevers, chills, or weight loss  Eyes: Negative for blurred vision or discharge         Ear/Nose/Throat: Negative for difficulty swallowing, sore throat, LAD                                                       Respiratory: Negative for cough, SOA, wheezing                                                                                        Cardiovascular: Negative for chest pain or palpitations                                                                  Gastrointestinal: Negative for nausea, vomiting or diarrhea                                                                     Genitourinary: Negative for dysuria or hematuria                                                                                           Musculoskeletal: Negative for any joint pains or muscle aches                                                                        Neurologic: Negative for any weakness, headaches, dizziness                                                                         Hematologic: Negative for any easy bleeding or bruising                                                                                   Psychiatric: Negative for anxiety or depression                          Past Medical History/Past Surgical History/ Family History/ Social History: Reviewed by me and unchanged from my previous documentation done on December 2022.     Medications:     Current Outpatient Medications:   •  apixaban (ELIQUIS) 5 MG tablet tablet, Take 1 tablet by mouth Every 12 (Twelve) Hours. Indications: DVT/PE (active thrombosis), Disp: 60 tablet, Rfl: 1  •  aspirin 81 MG EC tablet, Take 1 tablet by mouth Daily., Disp: 30 tablet, Rfl: 5  •  FLUoxetine (PROzac) 20 MG capsule, , Disp: , Rfl:   •  HYDROcodone-acetaminophen (NORCO) 5-325 MG per tablet, Take 2 tablets by mouth Every 4 (Four) Hours As Needed for Moderate Pain., Disp: 15 tablet, Rfl: 0  •  hydrOXYzine (ATARAX) 25 MG  "tablet, TAKE 2 TABLETS BY MOUTH EVERY 6 HOURS AS NEEDED FOR ANXIETY, Disp: , Rfl:   •  LORazepam (ATIVAN) 0.5 MG tablet, , Disp: , Rfl:   •  pantoprazole (PROTONIX) 40 MG EC tablet, Take 1 tablet by mouth Daily., Disp: , Rfl:     Allergies:   Allergies   Allergen Reactions   • Ciprofloxacin Hives       Objective     Physical Exam:  Vital Signs:   Vitals:    03/14/23 0951   BP: 149/88   Pulse: 92   Resp: 18   Temp: 98.1 °F (36.7 °C)   SpO2: 99%   Weight: Comment: PT declined   Height: 165.1 cm (65\")   PainSc: 0-No pain     Pain Score    03/14/23 0951   PainSc: 0-No pain     ECOG Performance Status: 0 - Asymptomatic    Constitutional: NAD, ECOG 0  Eyes: PERRLA, scleral anicteric  ENT: No LAD, no thyromegaly  Respiratory: CTAB, no wheezing, rales, rhonchi  Cardiovascular: RRR, no murmurs, pulses 2+ bilaterally  Abdomen: soft, NT/ND, no HSM  Musculoskeletal: strength 5/5 bilaterally, no c/c/e  Neurologic: A&O x 3, CN II-XII intact grossly    Results Review:   No visits with results within 2 Week(s) from this visit.   Latest known visit with results is:   Hospital Outpatient Visit on 12/02/2022   Component Date Value Ref Range Status   • Target HR (85%) 12/02/2022 161  bpm Final   • Max. Pred. HR (100%) 12/02/2022 189  bpm Final   • Right Common Femoral Spont 12/02/2022 Y   Final   • Right Common Femoral Phasic 12/02/2022 Y   Final   • Right Common Femoral Compress 12/02/2022 C   Final   • Right Common Femoral Augment 12/02/2022 Y   Final   • Left Common Femoral Spont 12/02/2022 Y   Final   • Left Common Femoral Phasic 12/02/2022 Y   Final   • Left Common Femoral Compress 12/02/2022 C   Final   • Left Common Femoral Augment 12/02/2022 Y   Final   • Left Saphenofemoral Junction Compr* 12/02/2022 C   Final   • Left Profunda Femoral Compress 12/02/2022 C   Final   • Left Proximal Femoral Compress 12/02/2022 C   Final   • Left Mid Femoral Spont 12/02/2022 Y   Final   • Left Mid Femoral Phasic 12/02/2022 Y   Final   • Left " Mid Femoral Compress 12/02/2022 C   Final   • Left Mid Femoral Augment 12/02/2022 Y   Final   • Left Distal Femoral Compress 12/02/2022 C   Final   • Left Popliteal Spont 12/02/2022 Y   Final   • Left Popliteal Phasic 12/02/2022 Y   Final   • Left Popliteal Compress 12/02/2022 C   Final   • Left Popliteal Augment 12/02/2022 Y   Final   • Left Posterior Tibial Compress 12/02/2022 C   Final   • Left Peroneal Compress 12/02/2022 C   Final   • Left Gastronemius Compress 12/02/2022 C   Final   • Left Greater Saph AK Compress 12/02/2022 C   Final   • Left Greater Saph BK Compress 12/02/2022 C   Final   • Left Lesser Saph Compress 12/02/2022 C   Final       CT Angiogram Chest    Result Date: 3/7/2023  Narrative: CT ANGIOGRAM CHEST Date of Exam: 3/7/2023 1:53 PM EST Indication: check for clot resolution. Comparison: 12/2/2022 Technique: CTA of the chest was performed after the uneventful intravenous administration of 85 mL Isovue-370 . Reconstructed coronal and sagittal images were also obtained. In addition, a 3-D volume rendered image was created for interpretation. Automated exposure control and iterative reconstruction methods were used. Findings: 1. There are a few peripheral and lead shaped filling defects within the distal segmental pulmonary arteries consistent with sequela of prior thrombus. No evidence of acute pulmonary embolism is identified. The main pulmonary artery measures 2.5 cm in diameter. The aorta is normal in caliber. The thyroid and esophagus are unremarkable. Limited evaluation of the upper abdomen demonstrates post gastric sleeve changes. Status post cholecystectomy. The central airways are patent. There are calcified pulmonary nodules consistent with prior granulomatous infection. No suspicious pulmonary nodules are identified. The lungs are otherwise clear. No aggressive appearing lytic or sclerotic bone lesions.     Impression: Impression: 1. Minimal sequela of chronic PE. No acute PE is  identified. Electronically Signed: Matt Montoya  3/7/2023 2:24 PM EST  Workstation ID: [id number]      Assessment / Plan      Assessment/Plan:   1. Deep vein thrombosis (DVT) associated with COVID-19 (Primary)/2. Other acute pulmonary embolism without acute cor pulmonale (HCC)  -Patient with extensive thrombosis in her left lower extremity and pulmonary embolism secondary to COVID-19  -Status post direct catheter therapy and thrombectomy in her left lower extremity thrombosis  -No indication for thrombectomy or direct lateral therapy in her pulmonary embolus as she was hemodynamically stable in the hospital  -Hypercoagulable work-up negative  -Left lower extremity duplex showing clot resolution  -CT angiogram December 2022 showing only a small clot burden which is significantly decreased from her initial presentation  -CT angiogram in March 2023 showing only some mild sequela of chronic PE and scarring.  No acute PE identified  -Discussed with patient about discontinuing anticoagulation vs continuing prophylactic dose anticoagulation  -Per discussion, plan to discontinue anticoagulation at this time  -Counseled on signs and symptoms of recurrent DVT/PE including but not limited to lower extremity pain, swelling, erythema, cramping, chest pain, shortness of breath         Follow Up:   Follow-up as needed     Raymond Davey MD  Hematology and Oncology     Please note that portions of this note may have been completed with a voice recognition program. Efforts were made to edit the dictations, but occasionally words are mistranscribed.

## 2023-04-19 ENCOUNTER — TELEPHONE (OUTPATIENT)
Dept: CARDIAC SURGERY | Facility: CLINIC | Age: 33
End: 2023-04-19
Payer: COMMERCIAL

## 2023-04-19 ENCOUNTER — APPOINTMENT (OUTPATIENT)
Dept: CARDIOLOGY | Facility: HOSPITAL | Age: 33
End: 2023-04-19
Payer: COMMERCIAL

## 2023-04-19 ENCOUNTER — HOSPITAL ENCOUNTER (EMERGENCY)
Facility: HOSPITAL | Age: 33
Discharge: HOME | End: 2023-04-19
Attending: EMERGENCY MEDICINE | Admitting: EMERGENCY MEDICINE
Payer: COMMERCIAL

## 2023-04-19 VITALS
HEART RATE: 85 BPM | WEIGHT: 250 LBS | HEIGHT: 66 IN | SYSTOLIC BLOOD PRESSURE: 155 MMHG | DIASTOLIC BLOOD PRESSURE: 85 MMHG | TEMPERATURE: 97.7 F | BODY MASS INDEX: 40.18 KG/M2 | OXYGEN SATURATION: 98 % | RESPIRATION RATE: 16 BRPM

## 2023-04-19 DIAGNOSIS — Z86.718 HISTORY OF DVT (DEEP VEIN THROMBOSIS): ICD-10-CM

## 2023-04-19 DIAGNOSIS — M79.89 PAIN AND SWELLING OF LEFT LOWER LEG: Primary | ICD-10-CM

## 2023-04-19 DIAGNOSIS — M79.662 PAIN AND SWELLING OF LEFT LOWER LEG: Primary | ICD-10-CM

## 2023-04-19 PROCEDURE — 93971 EXTREMITY STUDY: CPT

## 2023-04-19 PROCEDURE — 99282 EMERGENCY DEPT VISIT SF MDM: CPT

## 2023-04-19 PROCEDURE — 93971 EXTREMITY STUDY: CPT | Performed by: INTERNAL MEDICINE

## 2023-04-19 NOTE — ED PROVIDER NOTES
Subjective   History of Present Illness  Pt is a 33 yo female presenting to ED with complaints of left leg pain.PMHx significant for PE, DVT, Depression, Anxiety and GERD. Pt explains she had DVT / PE after having Covid last year and was taken off Eliquis about a month ago. She had thrombectomy. She noticed left calf and ankle swelling and cramping today. She denies dizziness, syncope, CP or SOB. She denies injury. 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 She denies numbness or weakness to LE. She denies new back pain. She does not take birth control. She denies tobacco, drug or ETOH use.     History provided by:  Medical records and patient      Review of Systems   Constitutional: Negative for fever.   Respiratory: Negative for cough and shortness of breath.    Cardiovascular: Positive for leg swelling. Negative for chest pain.   Musculoskeletal: Positive for arthralgias and myalgias. Negative for back pain.   Skin: Negative for color change and wound.   Neurological: Negative for dizziness, weakness, numbness and headaches.       Past Medical History:   Diagnosis Date   • Anemia    • Anxiety    • Bradycardia, unspecified    • Depression    • DVT (deep venous thrombosis)     Left Lower Extremity.   • Dysfunctional gallbladder    • GERD (gastroesophageal reflux disease)    • Gestational hypertension    • H. pylori infection    • Hypertension    • Hyponatremia    • Ovarian cyst    • POTS (postural orthostatic tachycardia syndrome)    • Pulmonary emboli    • Tachycardia        Allergies   Allergen Reactions   • Ciprofloxacin Hives       Past Surgical History:   Procedure Laterality Date   •  SECTION  2018   •  SECTION PRIMARY  2016   •  SECTION WITH TUBAL Bilateral 2021    Procedure:  SECTION REPEAT WITH TUBAL;  Surgeon: Vinayak Gutierrez MD;  Location: [facility] LABOR DELIVERY;  Service: Obstetrics/Gynecology;  Laterality: Bilateral;   •  CHOLECYSTECTOMY N/A 3/13/2020    Procedure: CHOLECYSTECTOMY LAPAROSCOPIC;  Surgeon: Toby Murphy MD;  Location: [facility];  Service: General;  Laterality: N/A;   • COLONOSCOPY     • ENDOSCOPY     • FEMORAL THROMBECTOMY/EMBOLECTOMY Left 9/9/2022    Procedure: venogram, initiation of TPA at left leg and IVC;  Surgeon: Cooper Zhao MD;  Location: [facility];  Service: Vascular;  Laterality: Left;   • FEMORAL THROMBECTOMY/EMBOLECTOMY Left 9/10/2022    Procedure: ILEOFEMORAL EMBOLECTOMY, VENOGRAM;  Surgeon: Cooper Zhao MD;  Location: [facility];  Service: Vascular;  Laterality: Left;  FLUORO: 18M48S  DOSE: 2900 MGY  CONTRAST: 70 ML   • FEMORAL THROMBECTOMY/EMBOLECTOMY Left 9/12/2022    Procedure: ULTRASOUND GUIDED BILATERAL FEMORAL VEIN ACCESS, LEFT ILIAC THROMBECTOMY WITH INARI, VENOGRAPHY, AND PLACEMENT OF 20X40 WALL STENT;  Surgeon: Cooper Zhao MD;  Location: [facility];  Service: Vascular;  Laterality: Left;  fl- 10 MIN  6 SEC  dose- 582 MGY  contrast- 80 ML      • GASTRIC SLEEVE LAPAROSCOPIC     • TONSILLECTOMY     • WISDOM TOOTH EXTRACTION         Family History   Problem Relation Age of Onset   • Thyroid disease Mother    • Graves' disease Mother    • Myasthenia gravis Mother    • Hypertension Father    • No Known Problems Sister    • Hypertension Brother    • Hypertension Brother    • Diabetes Maternal Grandmother    • Cirrhosis Maternal Grandmother    • Cancer Maternal Grandmother    • No Known Problems Paternal Grandmother    • Cancer Paternal Grandfather    • No Known Problems Daughter    • No Known Problems Son        Social History     Socioeconomic History   • Marital status:      Spouse name: nate gary   • Number of children: 2   • Highest education level: High school graduate   Tobacco Use   • Smoking status: Never   • Smokeless tobacco: Never   Vaping Use   • Vaping Use: Never used   Substance and Sexual Activity   • Alcohol use: Not Currently      Comment: social   • Drug use: Never   • Sexual activity: Defer     Partners: Male     Birth control/protection: None, Surgical           Objective   Physical Exam  Vitals and nursing note reviewed.   Constitutional:       General: She is not in acute distress.     Appearance: She is obese.   Eyes:      Extraocular Movements: Extraocular movements intact.      Conjunctiva/sclera: Conjunctivae normal.   Cardiovascular:      Rate and Rhythm: Normal rate.      Pulses: Normal pulses.      Heart sounds: Normal heart sounds.   Pulmonary:      Effort: Pulmonary effort is normal. No respiratory distress.   Musculoskeletal:         General: Normal range of motion.      Cervical back: Normal range of motion and neck supple.      Left upper leg: No swelling or tenderness.      Left lower leg: Swelling and tenderness present.      Left ankle: Swelling present. No tenderness. Normal range of motion.   Skin:     General: Skin is warm.      Capillary Refill: Capillary refill takes less than 2 seconds.   Neurological:      General: No focal deficit present.      Mental Status: She is alert.         Procedures           ED Course      Recent Results (from the past 24 hour(s))   Duplex Venous Lower Extremity - Left CAR    Collection Time: 04/19/23  7:23 PM   Result Value Ref Range    Target HR (85%) 161 bpm    Max. Pred. HR (100%) 189 bpm    Right Common Femoral Spont Y     Right Common Femoral Phasic Y     Right Common Femoral Compress C     Right Common Femoral Augment Y     Left Common Femoral Spont Y     Left Common Femoral Phasic Y     Left Common Femoral Compress C     Left Common Femoral Augment Y     Left Saphenofemoral Junction Compress C     Left Profunda Femoral Compress C     Left Proximal Femoral Compress C     Left Mid Femoral Spont Y     Left Mid Femoral Phasic Y     Left Mid Femoral Compress C     Left Mid Femoral Augment Y     Left Distal Femoral Compress C     Left Popliteal Spont Y     Left Popliteal Phasic Y      "Left Popliteal Compress C     Left Popliteal Augment Y     Left Posterior Tibial Compress C     Left Peroneal Compress C     Left Gastronemius Compress C     Left Greater Saph AK Compress C     Left Greater Saph BK Compress C     Left Lesser Saph Compress C     BH CV VAS PRELIMINARY FINDINGS SCRIPTING 1.0      Note: In addition to lab results from this visit, the labs listed above may include labs taken at another facility or during a different encounter within the last 24 hours. Please correlate lab times with ED admission and discharge times for further clarification of the services performed during this visit.    No orders to display     Vitals:    04/19/23 1821   BP: (!) 163/102   BP Location: Left arm   Patient Position: Sitting   Pulse: 87   Resp: 18   Temp: 97.7 °F (36.5 °C)   TempSrc: Oral   SpO2: 97%   Weight: 113 kg (250 lb)   Height: 167.6 cm (66\")     Medications - No data to display  ECG/EMG Results (last 24 hours)     ** No results found for the last 24 hours. **        No orders to display       DISCHARGE    Patient discharged in stable condition.    Reviewed implications of results, diagnosis, meds, responsibility to follow up, warning signs and symptoms of possible worsening, potential complications and reasons to return to ER.    Patient/Family voiced understanding of above instructions.    Discussed plan for discharge, as there is no emergent indication for admission.  Pt/family is agreeable and understands need for follow up and possible repeat testing.  Pt/family is aware that discharge does not mean that nothing is wrong but that it indicates no emergency is currently present that requires admission and they must continue care with follow-up as given below or with a physician of their choice.     FOLLOW-UP  Cha Fernández, APRN  [address] [place] [address] [phone]    Schedule an appointment as soon as possible for a visit       [facility] Emergency " Department  [address] [facility] [facility] [id number] [phone]    If symptoms worsen         Medication List      No changes were made to your prescriptions during this visit.                                            Medical Decision Making  Pt is a 31 yo female presenting to ED with complaints of left lower leg pain and swelling. Doppler negative for DVT. Discussed elevation and compression stockings. She has no dizziness, CP or SOB. She will f/u with PCP and return to ED if new / worse sx.     DDx  DVT, PE, Cellulitis, PVD    History of DVT (deep vein thrombosis): acute illness or injury  Pain and swelling of left lower leg: acute illness or injury  Amount and/or Complexity of Data Reviewed  External Data Reviewed: notes.     Details: Admission, Oncology, CT surgery  ECG/medicine tests:  Decision-making details documented in ED Course.          Final diagnoses:   Pain and swelling of left lower leg   History of DVT (deep vein thrombosis)       ED Disposition  ED Disposition     ED Disposition   Discharge    Condition   Stable    Comment   --             Cha Fernández, APRN  [address] [place] [address]  [phone]    Schedule an appointment as soon as possible for a visit       [facility] Emergency Department  [facility] [facility] [address] [phone]    If symptoms worsen         Medication List      No changes were made to your prescriptions during this visit.          Asya Bright PA  04/19/23 1942

## 2023-04-19 NOTE — TELEPHONE ENCOUNTER
Caller: TONG BLACKBURN    Relationship: Emergency Contact    Best call back number: [phone]    What is the best time to reach you: ANY    Who are you requesting to speak with (clinical staff, provider,  specific staff member): CLINICAL    Do you know the name of the person who called: PT SPOUSE     What was the call regarding:   PATIENT SPOUSE CALLED HUB STATING THAT THE PATIENT WAS HAVING SYMPTOMS OF DVT AGAIN. TWO DAYS AGO PT LEFT FOOT STARTED TO SWELL AND A PURPLE LOOKING COLOR IS STARTING TO APPEAR. PATIENT WAS TOLD IN THE PAST THAT IF THE SYMPTOMS GET WORSE TO TAKE ELIQUIS TO SEE IF IT WILL HELP WITH THE SWELLING .    Do you require a callback: YES

## 2023-04-20 LAB
BH CV LOWER VASCULAR LEFT COMMON FEMORAL AUGMENT: NORMAL
BH CV LOWER VASCULAR LEFT COMMON FEMORAL COMPRESS: NORMAL
BH CV LOWER VASCULAR LEFT COMMON FEMORAL PHASIC: NORMAL
BH CV LOWER VASCULAR LEFT COMMON FEMORAL SPONT: NORMAL
BH CV LOWER VASCULAR LEFT DISTAL FEMORAL COMPRESS: NORMAL
BH CV LOWER VASCULAR LEFT GASTRONEMIUS COMPRESS: NORMAL
BH CV LOWER VASCULAR LEFT GREATER SAPH AK COMPRESS: NORMAL
BH CV LOWER VASCULAR LEFT GREATER SAPH BK COMPRESS: NORMAL
BH CV LOWER VASCULAR LEFT LESSER SAPH COMPRESS: NORMAL
BH CV LOWER VASCULAR LEFT MID FEMORAL AUGMENT: NORMAL
BH CV LOWER VASCULAR LEFT MID FEMORAL COMPRESS: NORMAL
BH CV LOWER VASCULAR LEFT MID FEMORAL PHASIC: NORMAL
BH CV LOWER VASCULAR LEFT MID FEMORAL SPONT: NORMAL
BH CV LOWER VASCULAR LEFT PERONEAL COMPRESS: NORMAL
BH CV LOWER VASCULAR LEFT POPLITEAL AUGMENT: NORMAL
BH CV LOWER VASCULAR LEFT POPLITEAL COMPRESS: NORMAL
BH CV LOWER VASCULAR LEFT POPLITEAL PHASIC: NORMAL
BH CV LOWER VASCULAR LEFT POPLITEAL SPONT: NORMAL
BH CV LOWER VASCULAR LEFT POSTERIOR TIBIAL COMPRESS: NORMAL
BH CV LOWER VASCULAR LEFT PROFUNDA FEMORAL COMPRESS: NORMAL
BH CV LOWER VASCULAR LEFT PROXIMAL FEMORAL COMPRESS: NORMAL
BH CV LOWER VASCULAR LEFT SAPHENOFEMORAL JUNCTION COMPRESS: NORMAL
BH CV LOWER VASCULAR RIGHT COMMON FEMORAL AUGMENT: NORMAL
BH CV LOWER VASCULAR RIGHT COMMON FEMORAL COMPRESS: NORMAL
BH CV LOWER VASCULAR RIGHT COMMON FEMORAL PHASIC: NORMAL
BH CV LOWER VASCULAR RIGHT COMMON FEMORAL SPONT: NORMAL
BH CV VAS PRELIMINARY FINDINGS SCRIPTING: 1
MAXIMAL PREDICTED HEART RATE: 189 BPM
STRESS TARGET HR: 161 BPM

## 2023-04-20 NOTE — TELEPHONE ENCOUNTER
I was able to speak to Emergency contact today - he did bring pt to ED yesterday and was told she did NOT have DVT so this seemed to give them the assurance needed.  She has previously discussed things with Dr. Davey (Hem) and was told she could take an Eliquis periodically if she had a concern and so she has done this for 2 days but did not take it today.  Advised to follow up with hematology regarding further medication recommendations and call us anytime but it sounds like they did the right thing by getting her evaluated asap.

## 2023-06-12 ENCOUNTER — APPOINTMENT (OUTPATIENT)
Dept: GENERAL RADIOLOGY | Facility: HOSPITAL | Age: 33
DRG: 176 | End: 2023-06-12
Payer: COMMERCIAL

## 2023-06-12 ENCOUNTER — HOSPITAL ENCOUNTER (INPATIENT)
Facility: HOSPITAL | Age: 33
LOS: 2 days | Discharge: HOME | DRG: 176 | End: 2023-06-14
Attending: EMERGENCY MEDICINE | Admitting: INTERNAL MEDICINE
Payer: COMMERCIAL

## 2023-06-12 ENCOUNTER — APPOINTMENT (OUTPATIENT)
Dept: CT IMAGING | Facility: HOSPITAL | Age: 33
DRG: 176 | End: 2023-06-12
Payer: COMMERCIAL

## 2023-06-12 DIAGNOSIS — R06.00 DYSPNEA, UNSPECIFIED TYPE: ICD-10-CM

## 2023-06-12 DIAGNOSIS — I26.92 ACUTE SADDLE PULMONARY EMBOLISM WITHOUT ACUTE COR PULMONALE: Primary | ICD-10-CM

## 2023-06-12 LAB
ALBUMIN SERPL-MCNC: 3.9 G/DL (ref 3.5–5.2)
ALBUMIN/GLOB SERPL: 1.2 G/DL
ALP SERPL-CCNC: 70 U/L (ref 39–117)
ALT SERPL W P-5'-P-CCNC: 17 U/L (ref 1–33)
ANION GAP SERPL CALCULATED.3IONS-SCNC: 13 MMOL/L (ref 5–15)
APTT PPP: 24.4 SECONDS (ref 60–90)
AST SERPL-CCNC: 15 U/L (ref 1–32)
B-HCG UR QL: NEGATIVE
BACTERIA UR QL AUTO: NORMAL /HPF
BASOPHILS # BLD AUTO: 0.04 10*3/MM3 (ref 0–0.2)
BASOPHILS NFR BLD AUTO: 0.4 % (ref 0–1.5)
BILIRUB SERPL-MCNC: 0.2 MG/DL (ref 0–1.2)
BILIRUB UR QL STRIP: NEGATIVE
BUN SERPL-MCNC: 6 MG/DL (ref 6–20)
BUN/CREAT SERPL: 8.3 (ref 7–25)
CALCIUM SPEC-SCNC: 9.2 MG/DL (ref 8.6–10.5)
CHLORIDE SERPL-SCNC: 110 MMOL/L (ref 98–107)
CLARITY UR: ABNORMAL
CO2 SERPL-SCNC: 19 MMOL/L (ref 22–29)
COLOR UR: YELLOW
CREAT SERPL-MCNC: 0.72 MG/DL (ref 0.57–1)
D DIMER PPP FEU-MCNC: 1.31 MCGFEU/ML (ref 0–0.5)
DEPRECATED RDW RBC AUTO: 48 FL (ref 37–54)
EGFRCR SERPLBLD CKD-EPI 2021: 114.1 ML/MIN/1.73
EOSINOPHIL # BLD AUTO: 0.09 10*3/MM3 (ref 0–0.4)
EOSINOPHIL NFR BLD AUTO: 0.9 % (ref 0.3–6.2)
ERYTHROCYTE [DISTWIDTH] IN BLOOD BY AUTOMATED COUNT: 17.4 % (ref 12.3–15.4)
EXPIRATION DATE: NORMAL
GLOBULIN UR ELPH-MCNC: 3.3 GM/DL
GLUCOSE SERPL-MCNC: 107 MG/DL (ref 65–99)
GLUCOSE UR STRIP-MCNC: NEGATIVE MG/DL
HCT VFR BLD AUTO: 37.4 % (ref 34–46.6)
HGB BLD-MCNC: 10.5 G/DL (ref 12–15.9)
HGB UR QL STRIP.AUTO: NEGATIVE
HOLD SPECIMEN: NORMAL
HOLD SPECIMEN: NORMAL
HYALINE CASTS UR QL AUTO: NORMAL /LPF
IMM GRANULOCYTES # BLD AUTO: 0.02 10*3/MM3 (ref 0–0.05)
IMM GRANULOCYTES NFR BLD AUTO: 0.2 % (ref 0–0.5)
INR PPP: 0.99 (ref 0.89–1.12)
INTERNAL NEGATIVE CONTROL: NORMAL
INTERNAL POSITIVE CONTROL: NORMAL
KETONES UR QL STRIP: NEGATIVE
LEUKOCYTE ESTERASE UR QL STRIP.AUTO: NEGATIVE
LIPASE SERPL-CCNC: 30 U/L (ref 13–60)
LYMPHOCYTES # BLD AUTO: 3.21 10*3/MM3 (ref 0.7–3.1)
LYMPHOCYTES NFR BLD AUTO: 32.7 % (ref 19.6–45.3)
Lab: NORMAL
MCH RBC QN AUTO: 21.7 PG (ref 26.6–33)
MCHC RBC AUTO-ENTMCNC: 28.1 G/DL (ref 31.5–35.7)
MCV RBC AUTO: 77.3 FL (ref 79–97)
MONOCYTES # BLD AUTO: 0.99 10*3/MM3 (ref 0.1–0.9)
MONOCYTES NFR BLD AUTO: 10.1 % (ref 5–12)
NEUTROPHILS NFR BLD AUTO: 5.47 10*3/MM3 (ref 1.7–7)
NEUTROPHILS NFR BLD AUTO: 55.7 % (ref 42.7–76)
NITRITE UR QL STRIP: NEGATIVE
NRBC BLD AUTO-RTO: 0 /100 WBC (ref 0–0.2)
NT-PROBNP SERPL-MCNC: 368.4 PG/ML (ref 0–450)
PH UR STRIP.AUTO: 5.5 [PH] (ref 5–8)
PLATELET # BLD AUTO: 241 10*3/MM3 (ref 140–450)
PMV BLD AUTO: 9.8 FL (ref 6–12)
POTASSIUM SERPL-SCNC: 3.5 MMOL/L (ref 3.5–5.2)
PROT SERPL-MCNC: 7.2 G/DL (ref 6–8.5)
PROT UR QL STRIP: NEGATIVE
PROTHROMBIN TIME: 13.1 SECONDS (ref 12.2–14.5)
RBC # BLD AUTO: 4.84 10*6/MM3 (ref 3.77–5.28)
RBC # UR STRIP: NORMAL /HPF
REF LAB TEST METHOD: NORMAL
SODIUM SERPL-SCNC: 142 MMOL/L (ref 136–145)
SP GR UR STRIP: 1.01 (ref 1–1.03)
SQUAMOUS #/AREA URNS HPF: NORMAL /HPF
TROPONIN T SERPL HS-MCNC: 11 NG/L
UFH PPP CHRO-ACNC: 0.1 IU/ML (ref 0.3–0.7)
UROBILINOGEN UR QL STRIP: ABNORMAL
WBC # UR STRIP: NORMAL /HPF
WBC NRBC COR # BLD: 9.82 10*3/MM3 (ref 3.4–10.8)
WHOLE BLOOD HOLD COAG: NORMAL
WHOLE BLOOD HOLD SPECIMEN: NORMAL

## 2023-06-12 PROCEDURE — 25010000002 HEPARIN (PORCINE) 25000-0.45 UT/250ML-% SOLUTION: Performed by: EMERGENCY MEDICINE

## 2023-06-12 PROCEDURE — 71045 X-RAY EXAM CHEST 1 VIEW: CPT

## 2023-06-12 PROCEDURE — 36415 COLL VENOUS BLD VENIPUNCTURE: CPT

## 2023-06-12 PROCEDURE — 81025 URINE PREGNANCY TEST: CPT | Performed by: EMERGENCY MEDICINE

## 2023-06-12 PROCEDURE — 85025 COMPLETE CBC W/AUTO DIFF WBC: CPT | Performed by: EMERGENCY MEDICINE

## 2023-06-12 PROCEDURE — 25010000002 HEPARIN (PORCINE) PER 1000 UNITS: Performed by: EMERGENCY MEDICINE

## 2023-06-12 PROCEDURE — 80053 COMPREHEN METABOLIC PANEL: CPT | Performed by: EMERGENCY MEDICINE

## 2023-06-12 PROCEDURE — 81001 URINALYSIS AUTO W/SCOPE: CPT | Performed by: EMERGENCY MEDICINE

## 2023-06-12 PROCEDURE — 84484 ASSAY OF TROPONIN QUANT: CPT | Performed by: EMERGENCY MEDICINE

## 2023-06-12 PROCEDURE — 25510000001 IOPAMIDOL PER 1 ML: Performed by: EMERGENCY MEDICINE

## 2023-06-12 PROCEDURE — 99285 EMERGENCY DEPT VISIT HI MDM: CPT

## 2023-06-12 PROCEDURE — 25010000002 LORAZEPAM PER 2 MG: Performed by: EMERGENCY MEDICINE

## 2023-06-12 PROCEDURE — 93005 ELECTROCARDIOGRAM TRACING: CPT

## 2023-06-12 PROCEDURE — 85730 THROMBOPLASTIN TIME PARTIAL: CPT | Performed by: EMERGENCY MEDICINE

## 2023-06-12 PROCEDURE — 71275 CT ANGIOGRAPHY CHEST: CPT

## 2023-06-12 PROCEDURE — 85379 FIBRIN DEGRADATION QUANT: CPT | Performed by: EMERGENCY MEDICINE

## 2023-06-12 PROCEDURE — 83880 ASSAY OF NATRIURETIC PEPTIDE: CPT | Performed by: EMERGENCY MEDICINE

## 2023-06-12 PROCEDURE — 85520 HEPARIN ASSAY: CPT | Performed by: EMERGENCY MEDICINE

## 2023-06-12 PROCEDURE — 83690 ASSAY OF LIPASE: CPT | Performed by: EMERGENCY MEDICINE

## 2023-06-12 PROCEDURE — 93005 ELECTROCARDIOGRAM TRACING: CPT | Performed by: EMERGENCY MEDICINE

## 2023-06-12 PROCEDURE — 85610 PROTHROMBIN TIME: CPT | Performed by: EMERGENCY MEDICINE

## 2023-06-12 RX ORDER — POLYETHYLENE GLYCOL 3350 17 G/17G
17 POWDER, FOR SOLUTION ORAL DAILY PRN
Status: DISCONTINUED | OUTPATIENT
Start: 2023-06-12 | End: 2023-06-14 | Stop reason: HOSPADM

## 2023-06-12 RX ORDER — ASPIRIN 81 MG/1
324 TABLET, CHEWABLE ORAL ONCE
Status: DISCONTINUED | OUTPATIENT
Start: 2023-06-12 | End: 2023-06-13

## 2023-06-12 RX ORDER — HEPARIN SODIUM 10000 [USP'U]/100ML
16 INJECTION, SOLUTION INTRAVENOUS
Status: DISCONTINUED | OUTPATIENT
Start: 2023-06-12 | End: 2023-06-14

## 2023-06-12 RX ORDER — NITROGLYCERIN 0.4 MG/1
0.4 TABLET SUBLINGUAL
Status: DISCONTINUED | OUTPATIENT
Start: 2023-06-12 | End: 2023-06-13 | Stop reason: SDUPTHER

## 2023-06-12 RX ORDER — BISACODYL 5 MG/1
5 TABLET, DELAYED RELEASE ORAL DAILY PRN
Status: DISCONTINUED | OUTPATIENT
Start: 2023-06-12 | End: 2023-06-14 | Stop reason: HOSPADM

## 2023-06-12 RX ORDER — SODIUM CHLORIDE 0.9 % (FLUSH) 0.9 %
10 SYRINGE (ML) INJECTION AS NEEDED
Status: DISCONTINUED | OUTPATIENT
Start: 2023-06-12 | End: 2023-06-13 | Stop reason: SDUPTHER

## 2023-06-12 RX ORDER — HEPARIN SODIUM 1000 [USP'U]/ML
80 INJECTION, SOLUTION INTRAVENOUS; SUBCUTANEOUS ONCE
Status: COMPLETED | OUTPATIENT
Start: 2023-06-12 | End: 2023-06-12

## 2023-06-12 RX ORDER — BISACODYL 10 MG
10 SUPPOSITORY, RECTAL RECTAL DAILY PRN
Status: DISCONTINUED | OUTPATIENT
Start: 2023-06-12 | End: 2023-06-14 | Stop reason: HOSPADM

## 2023-06-12 RX ORDER — ACETAMINOPHEN 650 MG/1
650 SUPPOSITORY RECTAL EVERY 4 HOURS PRN
Status: DISCONTINUED | OUTPATIENT
Start: 2023-06-12 | End: 2023-06-14 | Stop reason: HOSPADM

## 2023-06-12 RX ORDER — ACETAMINOPHEN 325 MG/1
650 TABLET ORAL EVERY 4 HOURS PRN
Status: DISCONTINUED | OUTPATIENT
Start: 2023-06-12 | End: 2023-06-14 | Stop reason: HOSPADM

## 2023-06-12 RX ORDER — SODIUM CHLORIDE 9 MG/ML
40 INJECTION, SOLUTION INTRAVENOUS AS NEEDED
Status: DISCONTINUED | OUTPATIENT
Start: 2023-06-12 | End: 2023-06-14 | Stop reason: HOSPADM

## 2023-06-12 RX ORDER — HEPARIN SODIUM 1000 [USP'U]/ML
4000 INJECTION, SOLUTION INTRAVENOUS; SUBCUTANEOUS AS NEEDED
Status: DISCONTINUED | OUTPATIENT
Start: 2023-06-12 | End: 2023-06-12 | Stop reason: DRUGHIGH

## 2023-06-12 RX ORDER — HEPARIN SODIUM 1000 [USP'U]/ML
2000 INJECTION, SOLUTION INTRAVENOUS; SUBCUTANEOUS AS NEEDED
Status: DISCONTINUED | OUTPATIENT
Start: 2023-06-12 | End: 2023-06-12 | Stop reason: DRUGHIGH

## 2023-06-12 RX ORDER — AMOXICILLIN 250 MG
2 CAPSULE ORAL 2 TIMES DAILY
Status: DISCONTINUED | OUTPATIENT
Start: 2023-06-12 | End: 2023-06-14 | Stop reason: HOSPADM

## 2023-06-12 RX ORDER — SODIUM CHLORIDE, SODIUM LACTATE, POTASSIUM CHLORIDE, CALCIUM CHLORIDE 600; 310; 30; 20 MG/100ML; MG/100ML; MG/100ML; MG/100ML
100 INJECTION, SOLUTION INTRAVENOUS CONTINUOUS
Status: DISCONTINUED | OUTPATIENT
Start: 2023-06-12 | End: 2023-06-13

## 2023-06-12 RX ORDER — ONDANSETRON 2 MG/ML
4 INJECTION INTRAMUSCULAR; INTRAVENOUS EVERY 6 HOURS PRN
Status: DISCONTINUED | OUTPATIENT
Start: 2023-06-12 | End: 2023-06-14 | Stop reason: HOSPADM

## 2023-06-12 RX ORDER — LORAZEPAM 2 MG/ML
1 INJECTION INTRAMUSCULAR ONCE
Status: COMPLETED | OUTPATIENT
Start: 2023-06-12 | End: 2023-06-12

## 2023-06-12 RX ORDER — SODIUM CHLORIDE 0.9 % (FLUSH) 0.9 %
10 SYRINGE (ML) INJECTION EVERY 12 HOURS SCHEDULED
Status: DISCONTINUED | OUTPATIENT
Start: 2023-06-12 | End: 2023-06-14 | Stop reason: HOSPADM

## 2023-06-12 RX ORDER — SODIUM CHLORIDE 0.9 % (FLUSH) 0.9 %
10 SYRINGE (ML) INJECTION AS NEEDED
Status: DISCONTINUED | OUTPATIENT
Start: 2023-06-12 | End: 2023-06-14 | Stop reason: HOSPADM

## 2023-06-12 RX ADMIN — IOPAMIDOL 85 ML: 755 INJECTION, SOLUTION INTRAVENOUS at 22:24

## 2023-06-12 RX ADMIN — HEPARIN SODIUM 9440 UNITS: 1000 INJECTION INTRAVENOUS; SUBCUTANEOUS at 22:56

## 2023-06-12 RX ADMIN — LORAZEPAM 1 MG: 2 INJECTION INTRAMUSCULAR; INTRAVENOUS at 23:41

## 2023-06-12 RX ADMIN — HEPARIN SODIUM 13 UNITS/KG/HR: 10000 INJECTION, SOLUTION INTRAVENOUS at 22:57

## 2023-06-12 NOTE — Clinical Note
using micropuncture technique with ultrasound guidance into the left femoral vein. Sheath insertion delayed.

## 2023-06-12 NOTE — Clinical Note
A 8 fr sheath was  inserted using micropuncture technique into the right femoral artery. Sheath insertion not delayed.

## 2023-06-12 NOTE — Clinical Note
catheter removed  over the wire.    [id number]  [id number]  [id number] Aliases: ALIREZA PRESS WEDGE 6F 110CM  [id number]  [id number]

## 2023-06-12 NOTE — Clinical Note
Oskar Pollock inserted thru sheath.    [id number]  [id number]  [id number] Aliases: ALIREZA SPRINGER WEDGE 6F 110CM  [id number]  [id number]

## 2023-06-12 NOTE — Clinical Note
catheter inserted over wire.    [id number]  [id number]  [id number] Aliases: ALIREZA PRESS WEDGE 6F 110CM  [id number]  [id number]

## 2023-06-12 NOTE — Clinical Note
A 5 fr sheath was  inserted using micropuncture technique into the right femoral vein. Sheath insertion delayed.

## 2023-06-13 ENCOUNTER — APPOINTMENT (OUTPATIENT)
Dept: CARDIOLOGY | Facility: HOSPITAL | Age: 33
DRG: 176 | End: 2023-06-13
Payer: COMMERCIAL

## 2023-06-13 LAB
ACT BLD: 143 SECONDS (ref 82–152)
ACT BLD: 173 SECONDS (ref 82–152)
ANION GAP SERPL CALCULATED.3IONS-SCNC: 13 MMOL/L (ref 5–15)
APTT PPP: 48.6 SECONDS (ref 60–90)
BASOPHILS # BLD AUTO: 0.06 10*3/MM3 (ref 0–0.2)
BASOPHILS NFR BLD AUTO: 0.6 % (ref 0–1.5)
BH CV ECHO MEAS - AO MAX PG: 6.4 MMHG
BH CV ECHO MEAS - AO MEAN PG: 3.5 MMHG
BH CV ECHO MEAS - AO ROOT DIAM: 3.3 CM
BH CV ECHO MEAS - AO V2 MAX: 126.5 CM/SEC
BH CV ECHO MEAS - AO V2 VTI: 28.4 CM
BH CV ECHO MEAS - AVA(I,D): 2.29 CM2
BH CV ECHO MEAS - EDV(CUBED): 111.6 ML
BH CV ECHO MEAS - EDV(MOD-SP2): 79.8 ML
BH CV ECHO MEAS - EDV(MOD-SP4): 108 ML
BH CV ECHO MEAS - EF(MOD-BP): 57.2 %
BH CV ECHO MEAS - EF(MOD-SP2): 58.1 %
BH CV ECHO MEAS - EF(MOD-SP4): 55.3 %
BH CV ECHO MEAS - ESV(CUBED): 44.3 ML
BH CV ECHO MEAS - ESV(MOD-SP2): 33.4 ML
BH CV ECHO MEAS - ESV(MOD-SP4): 48.3 ML
BH CV ECHO MEAS - FS: 26.5 %
BH CV ECHO MEAS - IVS/LVPW: 0.98 CM
BH CV ECHO MEAS - IVSD: 1.18 CM
BH CV ECHO MEAS - LA DIMENSION: 3.3 CM
BH CV ECHO MEAS - LAT PEAK E' VEL: 13.2 CM/SEC
BH CV ECHO MEAS - LV MASS(C)D: 219.3 GRAMS
BH CV ECHO MEAS - LV MAX PG: 3.3 MMHG
BH CV ECHO MEAS - LV MEAN PG: 1.5 MMHG
BH CV ECHO MEAS - LV V1 MAX: 91.5 CM/SEC
BH CV ECHO MEAS - LV V1 VTI: 18.7 CM
BH CV ECHO MEAS - LVIDD: 4.8 CM
BH CV ECHO MEAS - LVIDS: 3.5 CM
BH CV ECHO MEAS - LVOT AREA: 3.5 CM2
BH CV ECHO MEAS - LVOT DIAM: 2.11 CM
BH CV ECHO MEAS - LVPWD: 1.21 CM
BH CV ECHO MEAS - MED PEAK E' VEL: 7.5 CM/SEC
BH CV ECHO MEAS - MV A MAX VEL: 46.3 CM/SEC
BH CV ECHO MEAS - MV DEC SLOPE: 622.2 CM/SEC2
BH CV ECHO MEAS - MV DEC TIME: 0.22 MSEC
BH CV ECHO MEAS - MV E MAX VEL: 90.4 CM/SEC
BH CV ECHO MEAS - MV E/A: 1.95
BH CV ECHO MEAS - MV MAX PG: 6.4 MMHG
BH CV ECHO MEAS - MV MEAN PG: 2.12 MMHG
BH CV ECHO MEAS - MV P1/2T: 59.5 MSEC
BH CV ECHO MEAS - MV V2 VTI: 31.6 CM
BH CV ECHO MEAS - MVA(P1/2T): 3.7 CM2
BH CV ECHO MEAS - MVA(VTI): 2.06 CM2
BH CV ECHO MEAS - PA ACC TIME: 0.08 SEC
BH CV ECHO MEAS - PA V2 MAX: 92.3 CM/SEC
BH CV ECHO MEAS - PI END-D VEL: 115.4 CM/SEC
BH CV ECHO MEAS - RAP SYSTOLE: 3 MMHG
BH CV ECHO MEAS - RVSP: 38 MMHG
BH CV ECHO MEAS - SV(LVOT): 65.1 ML
BH CV ECHO MEAS - SV(MOD-SP2): 46.4 ML
BH CV ECHO MEAS - SV(MOD-SP4): 59.7 ML
BH CV ECHO MEAS - TAPSE (>1.6): 1.85 CM
BH CV ECHO MEAS - TR MAX PG: 35 MMHG
BH CV ECHO MEAS - TR MAX VEL: 250.5 CM/SEC
BH CV ECHO MEASUREMENTS AVERAGE E/E' RATIO: 8.73
BH CV LOWER VASCULAR LEFT COMMON FEMORAL AUGMENT: NORMAL
BH CV LOWER VASCULAR LEFT COMMON FEMORAL COMPRESS: NORMAL
BH CV LOWER VASCULAR LEFT COMMON FEMORAL PHASIC: NORMAL
BH CV LOWER VASCULAR LEFT COMMON FEMORAL SPONT: NORMAL
BH CV LOWER VASCULAR LEFT DISTAL FEMORAL AUGMENT: NORMAL
BH CV LOWER VASCULAR LEFT DISTAL FEMORAL COMPRESS: NORMAL
BH CV LOWER VASCULAR LEFT GASTRONEMIUS COMPRESS: NORMAL
BH CV LOWER VASCULAR LEFT GREATER SAPH AK COMPRESS: NORMAL
BH CV LOWER VASCULAR LEFT GREATER SAPH BK COMPRESS: NORMAL
BH CV LOWER VASCULAR LEFT LESSER SAPH COMPRESS: NORMAL
BH CV LOWER VASCULAR LEFT MID FEMORAL AUGMENT: NORMAL
BH CV LOWER VASCULAR LEFT MID FEMORAL COMPRESS: NORMAL
BH CV LOWER VASCULAR LEFT PERONEAL COMPRESS: NORMAL
BH CV LOWER VASCULAR LEFT POPLITEAL AUGMENT: NORMAL
BH CV LOWER VASCULAR LEFT POPLITEAL COMPRESS: NORMAL
BH CV LOWER VASCULAR LEFT POSTERIOR TIBIAL COMPRESS: NORMAL
BH CV LOWER VASCULAR LEFT PROFUNDA FEMORAL COMPRESS: NORMAL
BH CV LOWER VASCULAR LEFT PROXIMAL FEMORAL COMPRESS: NORMAL
BH CV LOWER VASCULAR LEFT SAPHENOFEMORAL JUNCTION AUGMENT: NORMAL
BH CV LOWER VASCULAR LEFT SAPHENOFEMORAL JUNCTION COMPRESS: NORMAL
BH CV LOWER VASCULAR LEFT SAPHENOFEMORAL JUNCTION PHASIC: NORMAL
BH CV LOWER VASCULAR LEFT SAPHENOFEMORAL JUNCTION SPONT: NORMAL
BH CV LOWER VASCULAR LEFT SOLEAL COMPRESS: NORMAL
BH CV LOWER VASCULAR RIGHT COMMON FEMORAL AUGMENT: NORMAL
BH CV LOWER VASCULAR RIGHT COMMON FEMORAL COMPRESS: NORMAL
BH CV LOWER VASCULAR RIGHT COMMON FEMORAL PHASIC: NORMAL
BH CV LOWER VASCULAR RIGHT COMMON FEMORAL SPONT: NORMAL
BH CV LOWER VASCULAR RIGHT DISTAL FEMORAL AUGMENT: NORMAL
BH CV LOWER VASCULAR RIGHT DISTAL FEMORAL COMPRESS: NORMAL
BH CV LOWER VASCULAR RIGHT GASTRONEMIUS COMPRESS: NORMAL
BH CV LOWER VASCULAR RIGHT GREATER SAPH AK COMPRESS: NORMAL
BH CV LOWER VASCULAR RIGHT GREATER SAPH BK COMPRESS: NORMAL
BH CV LOWER VASCULAR RIGHT LESSER SAPH COMPRESS: NORMAL
BH CV LOWER VASCULAR RIGHT MID FEMORAL AUGMENT: NORMAL
BH CV LOWER VASCULAR RIGHT MID FEMORAL COMPRESS: NORMAL
BH CV LOWER VASCULAR RIGHT MID FEMORAL PHASIC: NORMAL
BH CV LOWER VASCULAR RIGHT PERONEAL COMPRESS: NORMAL
BH CV LOWER VASCULAR RIGHT POPLITEAL AUGMENT: NORMAL
BH CV LOWER VASCULAR RIGHT POPLITEAL COMPRESS: NORMAL
BH CV LOWER VASCULAR RIGHT POPLITEAL PHASIC: NORMAL
BH CV LOWER VASCULAR RIGHT POPLITEAL SPONT: NORMAL
BH CV LOWER VASCULAR RIGHT POSTERIOR TIBIAL COMPRESS: NORMAL
BH CV LOWER VASCULAR RIGHT PROFUNDA FEMORAL COMPRESS: NORMAL
BH CV LOWER VASCULAR RIGHT PROXIMAL FEMORAL COMPRESS: NORMAL
BH CV LOWER VASCULAR RIGHT SAPHENOFEMORAL JUNCTION AUGMENT: NORMAL
BH CV LOWER VASCULAR RIGHT SAPHENOFEMORAL JUNCTION COMPRESS: NORMAL
BH CV LOWER VASCULAR RIGHT SAPHENOFEMORAL JUNCTION PHASIC: NORMAL
BH CV LOWER VASCULAR RIGHT SAPHENOFEMORAL JUNCTION SPONT: NORMAL
BH CV LOWER VASCULAR RIGHT SOLEAL COMPRESS: NORMAL
BH CV VAS PRELIMINARY FINDINGS SCRIPTING: 1
BH CV XLRA - RV BASE: 3.4 CM
BH CV XLRA - RV LENGTH: 7.5 CM
BH CV XLRA - RV MID: 2.8 CM
BH CV XLRA - TDI S': 13.6 CM/SEC
BUN SERPL-MCNC: 6 MG/DL (ref 6–20)
BUN/CREAT SERPL: 9 (ref 7–25)
CALCIUM SPEC-SCNC: 8.6 MG/DL (ref 8.6–10.5)
CHLORIDE SERPL-SCNC: 106 MMOL/L (ref 98–107)
CO2 SERPL-SCNC: 19 MMOL/L (ref 22–29)
CREAT SERPL-MCNC: 0.67 MG/DL (ref 0.57–1)
DEPRECATED RDW RBC AUTO: 46.8 FL (ref 37–54)
EGFRCR SERPLBLD CKD-EPI 2021: 119.3 ML/MIN/1.73
EOSINOPHIL # BLD AUTO: 0.07 10*3/MM3 (ref 0–0.4)
EOSINOPHIL NFR BLD AUTO: 0.7 % (ref 0.3–6.2)
ERYTHROCYTE [DISTWIDTH] IN BLOOD BY AUTOMATED COUNT: 17.5 % (ref 12.3–15.4)
GEN 5 2HR TROPONIN T REFLEX: 16 NG/L
GLUCOSE SERPL-MCNC: 99 MG/DL (ref 65–99)
HCT VFR BLD AUTO: 32.1 % (ref 34–46.6)
HGB BLD-MCNC: 9.5 G/DL (ref 12–15.9)
HOLD SPECIMEN: NORMAL
IMM GRANULOCYTES # BLD AUTO: 0.02 10*3/MM3 (ref 0–0.05)
IMM GRANULOCYTES NFR BLD AUTO: 0.2 % (ref 0–0.5)
LEFT ATRIUM VOLUME INDEX: 26.8 ML/M2
LYMPHOCYTES # BLD AUTO: 3.73 10*3/MM3 (ref 0.7–3.1)
LYMPHOCYTES NFR BLD AUTO: 37.3 % (ref 19.6–45.3)
MAGNESIUM SERPL-MCNC: 2.1 MG/DL (ref 1.6–2.6)
MCH RBC QN AUTO: 22.4 PG (ref 26.6–33)
MCHC RBC AUTO-ENTMCNC: 29.6 G/DL (ref 31.5–35.7)
MCV RBC AUTO: 75.7 FL (ref 79–97)
MONOCYTES # BLD AUTO: 1.1 10*3/MM3 (ref 0.1–0.9)
MONOCYTES NFR BLD AUTO: 11 % (ref 5–12)
NEUTROPHILS NFR BLD AUTO: 5.01 10*3/MM3 (ref 1.7–7)
NEUTROPHILS NFR BLD AUTO: 50.2 % (ref 42.7–76)
NRBC BLD AUTO-RTO: 0 /100 WBC (ref 0–0.2)
PHOSPHATE SERPL-MCNC: 3.7 MG/DL (ref 2.5–4.5)
PLATELET # BLD AUTO: 239 10*3/MM3 (ref 140–450)
PMV BLD AUTO: 10.3 FL (ref 6–12)
POTASSIUM SERPL-SCNC: 3.8 MMOL/L (ref 3.5–5.2)
RBC # BLD AUTO: 4.24 10*6/MM3 (ref 3.77–5.28)
SODIUM SERPL-SCNC: 138 MMOL/L (ref 136–145)
TROPONIN T DELTA: 5 NG/L
UFH PPP CHRO-ACNC: 0.31 IU/ML (ref 0.3–0.7)
UFH PPP CHRO-ACNC: 0.36 IU/ML (ref 0.3–0.7)
WBC NRBC COR # BLD: 9.99 10*3/MM3 (ref 3.4–10.8)

## 2023-06-13 PROCEDURE — 25010000002 HEPARIN (PORCINE) PER 1000 UNITS: Performed by: INTERNAL MEDICINE

## 2023-06-13 PROCEDURE — 85347 COAGULATION TIME ACTIVATED: CPT

## 2023-06-13 PROCEDURE — 85520 HEPARIN ASSAY: CPT

## 2023-06-13 PROCEDURE — 25010000002 MIDAZOLAM PER 1 MG: Performed by: INTERNAL MEDICINE

## 2023-06-13 PROCEDURE — C1894 INTRO/SHEATH, NON-LASER: HCPCS | Performed by: INTERNAL MEDICINE

## 2023-06-13 PROCEDURE — 25010000002 LORAZEPAM PER 2 MG: Performed by: INTERNAL MEDICINE

## 2023-06-13 PROCEDURE — 75743 ARTERY X-RAYS LUNGS: CPT | Performed by: INTERNAL MEDICINE

## 2023-06-13 PROCEDURE — 99152 MOD SED SAME PHYS/QHP 5/>YRS: CPT | Performed by: INTERNAL MEDICINE

## 2023-06-13 PROCEDURE — 84100 ASSAY OF PHOSPHORUS: CPT | Performed by: NURSE PRACTITIONER

## 2023-06-13 PROCEDURE — 93970 EXTREMITY STUDY: CPT

## 2023-06-13 PROCEDURE — 25510000001 IOPAMIDOL PER 1 ML: Performed by: INTERNAL MEDICINE

## 2023-06-13 PROCEDURE — 80048 BASIC METABOLIC PNL TOTAL CA: CPT | Performed by: NURSE PRACTITIONER

## 2023-06-13 PROCEDURE — C1751 CATH, INF, PER/CENT/MIDLINE: HCPCS | Performed by: INTERNAL MEDICINE

## 2023-06-13 PROCEDURE — C1769 GUIDE WIRE: HCPCS | Performed by: INTERNAL MEDICINE

## 2023-06-13 PROCEDURE — 25010000002 DIPHENHYDRAMINE PER 50 MG: Performed by: INTERNAL MEDICINE

## 2023-06-13 PROCEDURE — 85730 THROMBOPLASTIN TIME PARTIAL: CPT | Performed by: NURSE PRACTITIONER

## 2023-06-13 PROCEDURE — 83735 ASSAY OF MAGNESIUM: CPT | Performed by: NURSE PRACTITIONER

## 2023-06-13 PROCEDURE — 85025 COMPLETE CBC W/AUTO DIFF WBC: CPT | Performed by: EMERGENCY MEDICINE

## 2023-06-13 PROCEDURE — 25010000002 FENTANYL CITRATE (PF) 50 MCG/ML SOLUTION: Performed by: INTERNAL MEDICINE

## 2023-06-13 PROCEDURE — 93306 TTE W/DOPPLER COMPLETE: CPT

## 2023-06-13 PROCEDURE — 99153 MOD SED SAME PHYS/QHP EA: CPT | Performed by: INTERNAL MEDICINE

## 2023-06-13 PROCEDURE — 25010000002 MORPHINE PER 10 MG: Performed by: NURSE PRACTITIONER

## 2023-06-13 PROCEDURE — 93451 RIGHT HEART CATH: CPT | Performed by: INTERNAL MEDICINE

## 2023-06-13 PROCEDURE — 25010000002 HEPARIN (PORCINE) 25000-0.45 UT/250ML-% SOLUTION: Performed by: INTERNAL MEDICINE

## 2023-06-13 PROCEDURE — 84484 ASSAY OF TROPONIN QUANT: CPT | Performed by: EMERGENCY MEDICINE

## 2023-06-13 PROCEDURE — C1887 CATHETER, GUIDING: HCPCS | Performed by: INTERNAL MEDICINE

## 2023-06-13 RX ORDER — SODIUM CHLORIDE 9 MG/ML
75 INJECTION, SOLUTION INTRAVENOUS CONTINUOUS
Status: DISCONTINUED | OUTPATIENT
Start: 2023-06-13 | End: 2023-06-14

## 2023-06-13 RX ORDER — MORPHINE SULFATE 2 MG/ML
2 INJECTION, SOLUTION INTRAMUSCULAR; INTRAVENOUS ONCE
Status: COMPLETED | OUTPATIENT
Start: 2023-06-13 | End: 2023-06-13

## 2023-06-13 RX ORDER — SODIUM CHLORIDE 9 MG/ML
250 INJECTION, SOLUTION INTRAVENOUS ONCE AS NEEDED
Status: DISCONTINUED | OUTPATIENT
Start: 2023-06-13 | End: 2023-06-14 | Stop reason: HOSPADM

## 2023-06-13 RX ORDER — LORAZEPAM 2 MG/ML
INJECTION INTRAMUSCULAR
Status: DISCONTINUED | OUTPATIENT
Start: 2023-06-13 | End: 2023-06-13 | Stop reason: HOSPADM

## 2023-06-13 RX ORDER — DIPHENHYDRAMINE HYDROCHLORIDE 50 MG/ML
INJECTION INTRAMUSCULAR; INTRAVENOUS
Status: DISCONTINUED | OUTPATIENT
Start: 2023-06-13 | End: 2023-06-13 | Stop reason: HOSPADM

## 2023-06-13 RX ORDER — HEPARIN SODIUM 1000 [USP'U]/ML
INJECTION, SOLUTION INTRAVENOUS; SUBCUTANEOUS
Status: DISCONTINUED | OUTPATIENT
Start: 2023-06-13 | End: 2023-06-13 | Stop reason: HOSPADM

## 2023-06-13 RX ORDER — FENTANYL CITRATE 50 UG/ML
INJECTION, SOLUTION INTRAMUSCULAR; INTRAVENOUS
Status: DISCONTINUED | OUTPATIENT
Start: 2023-06-13 | End: 2023-06-13 | Stop reason: HOSPADM

## 2023-06-13 RX ORDER — PANTOPRAZOLE SODIUM 40 MG/1
40 TABLET, DELAYED RELEASE ORAL
Status: DISCONTINUED | OUTPATIENT
Start: 2023-06-13 | End: 2023-06-14 | Stop reason: HOSPADM

## 2023-06-13 RX ORDER — ACETAMINOPHEN 325 MG/1
650 TABLET ORAL EVERY 4 HOURS PRN
Status: DISCONTINUED | OUTPATIENT
Start: 2023-06-13 | End: 2023-06-13 | Stop reason: SDUPTHER

## 2023-06-13 RX ORDER — MIDAZOLAM HYDROCHLORIDE 1 MG/ML
INJECTION INTRAMUSCULAR; INTRAVENOUS
Status: DISCONTINUED | OUTPATIENT
Start: 2023-06-13 | End: 2023-06-13 | Stop reason: HOSPADM

## 2023-06-13 RX ORDER — NITROGLYCERIN 0.4 MG/1
0.4 TABLET SUBLINGUAL
Status: DISCONTINUED | OUTPATIENT
Start: 2023-06-13 | End: 2023-06-14 | Stop reason: HOSPADM

## 2023-06-13 RX ORDER — LIDOCAINE HYDROCHLORIDE 10 MG/ML
INJECTION, SOLUTION EPIDURAL; INFILTRATION; INTRACAUDAL; PERINEURAL
Status: DISCONTINUED | OUTPATIENT
Start: 2023-06-13 | End: 2023-06-13 | Stop reason: HOSPADM

## 2023-06-13 RX ORDER — FLUOXETINE HYDROCHLORIDE 20 MG/1
20 CAPSULE ORAL DAILY
Status: DISCONTINUED | OUTPATIENT
Start: 2023-06-13 | End: 2023-06-14 | Stop reason: HOSPADM

## 2023-06-13 RX ORDER — TRAMADOL HYDROCHLORIDE 50 MG/1
50 TABLET ORAL EVERY 6 HOURS PRN
Status: DISCONTINUED | OUTPATIENT
Start: 2023-06-13 | End: 2023-06-14 | Stop reason: HOSPADM

## 2023-06-13 RX ORDER — LORAZEPAM 2 MG/ML
0.5 INJECTION INTRAMUSCULAR EVERY 4 HOURS PRN
Status: DISCONTINUED | OUTPATIENT
Start: 2023-06-13 | End: 2023-06-14

## 2023-06-13 RX ADMIN — LORAZEPAM 0.5 MG: 2 INJECTION INTRAMUSCULAR; INTRAVENOUS at 05:57

## 2023-06-13 RX ADMIN — Medication 10 ML: at 08:02

## 2023-06-13 RX ADMIN — SODIUM CHLORIDE 75 ML/HR: 9 INJECTION, SOLUTION INTRAVENOUS at 15:15

## 2023-06-13 RX ADMIN — LORAZEPAM 0.5 MG: 2 INJECTION INTRAMUSCULAR; INTRAVENOUS at 21:07

## 2023-06-13 RX ADMIN — MORPHINE SULFATE 2 MG: 2 INJECTION, SOLUTION INTRAMUSCULAR; INTRAVENOUS at 19:58

## 2023-06-13 RX ADMIN — Medication 10 ML: at 06:01

## 2023-06-13 RX ADMIN — SODIUM CHLORIDE 75 ML/HR: 9 INJECTION, SOLUTION INTRAVENOUS at 19:52

## 2023-06-13 RX ADMIN — FLUOXETINE 20 MG: 20 CAPSULE ORAL at 08:02

## 2023-06-13 RX ADMIN — LORAZEPAM 0.5 MG: 2 INJECTION INTRAMUSCULAR; INTRAVENOUS at 12:35

## 2023-06-13 RX ADMIN — PANTOPRAZOLE SODIUM 40 MG: 40 TABLET, DELAYED RELEASE ORAL at 05:57

## 2023-06-13 RX ADMIN — SODIUM CHLORIDE, POTASSIUM CHLORIDE, SODIUM LACTATE AND CALCIUM CHLORIDE 100 ML/HR: 600; 310; 30; 20 INJECTION, SOLUTION INTRAVENOUS at 03:04

## 2023-06-13 RX ADMIN — Medication 10 ML: at 20:03

## 2023-06-13 RX ADMIN — TRAMADOL HYDROCHLORIDE 50 MG: 50 TABLET, FILM COATED ORAL at 23:59

## 2023-06-13 RX ADMIN — SENNOSIDES AND DOCUSATE SODIUM 2 TABLET: 50; 8.6 TABLET ORAL at 21:48

## 2023-06-13 RX ADMIN — HEPARIN SODIUM 13 UNITS/KG/HR: 10000 INJECTION, SOLUTION INTRAVENOUS at 23:31

## 2023-06-13 NOTE — H&P
Pulmonary/Critical Care History and Physical Exam     LOS: 0 days   Patient Care Team:  Cha Fernández APRN as PCP - General (Family Medicine)  Raymond Davey MD as Consulting Physician (Hematology and Oncology)  Rafiq Martinez MD as Consulting Physician (Cardiology)    Chief Complaint:    Chief Complaint   Patient presents with    Rapid Heart Rate    Shortness of Breath       Subjective     HPI:     33 y.o. 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 Davey with hematology), who presents with a 2 to 3-day history of palpitations/tachycardia and progressive exertional dyspnea and is found to have a saddle pulmonary embolism on CT angiogram of the chest without radiographic evidence of right heart strain.    Patient denies family history of thromboembolic disease.  She is active with no recent periods of inactivity.  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  Has had previous extensive hypercoagulable work-up.    Patient was tachycardic on arrival with heart rate in the low 100s.  Currently her heart rate is ranging from the 70s to 90s.  She was started on a heparin drip by the emergency department.    History taken from: Patient    Past Medical History:   Diagnosis Date    Anemia     Anxiety     Bradycardia, unspecified     Depression     DVT (deep venous thrombosis)     Left Lower Extremity.    Dysfunctional gallbladder     GERD (gastroesophageal reflux disease)     Gestational hypertension     H. pylori infection     Hypertension     Hyponatremia     Ovarian cyst     POTS (postural orthostatic tachycardia  syndrome)     Pulmonary emboli     Tachycardia        Past Surgical History:   Procedure Laterality Date     SECTION  2018     SECTION PRIMARY  2016     SECTION WITH TUBAL Bilateral 2021    Procedure:  SECTION REPEAT WITH TUBAL;  Surgeon: Vinayak Gutierrez MD;  Location: [facility] LABOR DELIVERY;  Service: Obstetrics/Gynecology;  Laterality: Bilateral;    CHOLECYSTECTOMY N/A 3/13/2020    Procedure: CHOLECYSTECTOMY LAPAROSCOPIC;  Surgeon: Toby Murphy MD;  Location: [facility];  Service: General;  Laterality: N/A;    COLONOSCOPY      ENDOSCOPY      FEMORAL THROMBECTOMY/EMBOLECTOMY Left 2022    Procedure: venogram, initiation of TPA at left leg and IVC;  Surgeon: Cooper Zhao MD;  Location: [facility];  Service: Vascular;  Laterality: Left;    FEMORAL THROMBECTOMY/EMBOLECTOMY Left 9/10/2022    Procedure: ILEOFEMORAL EMBOLECTOMY, VENOGRAM;  Surgeon: Cooper Zhao MD;  Location: [facility];  Service: Vascular;  Laterality: Left;  FLUORO: 18M48S  DOSE: 2900 MGY  CONTRAST: 70 ML    FEMORAL THROMBECTOMY/EMBOLECTOMY Left 2022    Procedure: ULTRASOUND GUIDED BILATERAL FEMORAL VEIN ACCESS, LEFT ILIAC THROMBECTOMY WITH INARI, VENOGRAPHY, AND PLACEMENT OF 20X40 WALL STENT;  Surgeon: Cooper Zhao MD;  Location: [facility];  Service: Vascular;  Laterality: Left;  fl- 10 MIN  6 SEC  dose- 582 MGY  contrast- 80 ML       GASTRIC SLEEVE LAPAROSCOPIC      TONSILLECTOMY      WISDOM TOOTH EXTRACTION         Family History   Problem Relation Age of Onset    Thyroid disease Mother     Graves' disease Mother     Myasthenia gravis Mother     Hypertension Father     No Known Problems Sister     Hypertension Brother     Hypertension Brother     Diabetes Maternal Grandmother     Cirrhosis Maternal Grandmother     Cancer Maternal Grandmother     No Known Problems Paternal Grandmother     Cancer Paternal Grandfather     No Known Problems Daughter      No Known Problems Son        Social History     Socioeconomic History    Marital status:      Spouse name: nate gary    Number of children: 2    Highest education level: High school graduate   Tobacco Use    Smoking status: Never    Smokeless tobacco: Never   Vaping Use    Vaping Use: Never used   Substance and Sexual Activity    Alcohol use: Not Currently     Comment: social    Drug use: Never    Sexual activity: Defer     Partners: Male     Birth control/protection: None, Surgical       Allergies   Allergen Reactions    Ciprofloxacin Hives       Past Medical History/Family History/Social History were reviewed by me and updates were made.     Review of Systems:    Review of 14 systems was completed with positives and pertinent negatives noted in the subjective section.  All other systems reviewed and are negative.         Objective     Vital Signs  Temp:  [97.9 °F (36.6 °C)] 97.9 °F (36.6 °C)  Heart Rate:  [] 80  Resp:  [22] 22  BP: (155-161)/(101-110) 155/101  Body mass index is 41.97 kg/m².     No intake or output data in the 24 hours ending 06/12/23 7596   Physical Exam:     General Appearance:    Alert, cooperative, in no acute distress   Head:    Normocephalic, without obvious abnormality, atraumatic   Eyes:            Lids and lashes normal, conjunctivae and sclerae normal,    no icterus, no pallor, corneas clear, PER   ENMT:   Ears appear intact with no abnormalities noted.      Lips normal   Neck:   Trachea midline, no thyromegaly,      no JVD   Lungs/resp:     Normal effort, symmetric chest rise, no crepitus, clear to      auscultation bilaterally, no chest wall tenderness                  Heart/CV:    Regular rhythm and normal rate, normal S1 and S2, no         murmur   Abdomen/GI:     Normal bowel sounds, no masses, no organomegaly, soft,    nontender, nondistended   G/U:     Deferred   Extremities/MSK:   No clubbing or cyanosis.  Minimal/trace bilateral lower extremity edema.   Normal tone.  No deformities.    Pulses:   Pulses palpable and equal bilaterally   Skin:   No bleeding, bruising or rash   Hem/Lymph:   No cervical or supraclavicular adenopathy.    Neurologic:   Moves all extremities with no obvious focal motor deficit.  Cranial nerves 2 - 12 grossly intact            Psychiatric:  Normal mood and with anxious affect    The above findings are documentation of my personal physical exam findings from today.   Electronically signed by:  Johnny Panchal MD  06/12/23  23:45 EDT     Results Review:     I reviewed the patient's new clinical results.   Results from last 7 days   Lab Units 06/12/23  2101   SODIUM mmol/L 142   POTASSIUM mmol/L 3.5   CHLORIDE mmol/L 110*   CO2 mmol/L 19.0*   BUN mg/dL 6   CREATININE mg/dL 0.72   CALCIUM mg/dL 9.2   BILIRUBIN mg/dL 0.2   ALK PHOS U/L 70   ALT (SGPT) U/L 17   AST (SGOT) U/L 15   GLUCOSE mg/dL 107*     Results from last 7 days   Lab Units 06/12/23  2101   WBC 10*3/mm3 9.82   HEMOGLOBIN g/dL 10.5*   HEMATOCRIT % 37.4   PLATELETS 10*3/mm3 241           I reviewed the patient's new imaging including images and reports.    CTA chest reviewed and shows a large saddle pulmonary embolism with extension down into the proximal lower lobes.  No evidence of right heart strain.  Radiologist's impression follows:    IMPRESSION:  1.Large saddle embolus extending into bilateral central pulmonary arteries and its distal branches.  2.No acute pulmonary findings.    EKG shows sinus tachycardia with S1Q3T3 pattern of right heart strain which has been present on prior EKGs back to 2021.    Medication Review:   aspirin, 324 mg, Oral, Once  senna-docusate sodium, 2 tablet, Oral, BID  sodium chloride, 10 mL, Intravenous, Q12H      heparin, 13 Units/kg/hr, Last Rate: 13 Units/kg/hr (06/12/23 4007)  lactated ringers, 100 mL/hr  Pharmacy to Dose Heparin,         Assessment & Plan     Active Hospital Problems    Diagnosis     **Acute saddle pulmonary embolism without  acute cor pulmonale    GERD  Anxiety/depression  Hypertension  H/O POTS    33 y.o. 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 Davey with hematology), who presents with a 2 to 3-day history of palpitations/tachycardia and progressive exertional dyspnea and is found to have a saddle pulmonary embolism on CT angiogram of the chest without radiographic evidence of right heart strain.    Patient is without a family history of thromboembolic disease and has had a negative hypercoagulable work-up in the past.    Due to the recurrence of thromboembolic disease, patient will need lifelong anticoagulation.  We will check a lower extremity duplex to determine if she has residual lower extremity DVT, and check echocardiogram.    Given her saddle pulmonary embolism, along with her history of prior right heart strain evidenced by RV to LV ratio greater than 1 during her prior admission for PE, I think it is reasonable to admit her to the intensive care unit for close monitoring.  It is conceivable that the patient could develop worsening obstruction if her clots were to distally embolize in the short-term.  Given her currently stable hemodynamics however, I do not feel she needs thrombectomy or thrombolysis, and the risks of attempting these strategies likely outweigh potential benefit.    Plan:  1.  For acute submassive pulmonary embolism: Presented with tachycardia likely indicating at least transiently some right heart strain, though her CT angiogram subsequently did not show radiographic evidence of right heart strain.  EKG is also suggestive of right heart  strain with an S1Q3T3 pattern, though this has been present back to at least March 2021 (prior to initial presentation for pulmonary embolism).  I would recommend a sleep study if this has not been done in the past.  Check lower extremity duplex tomorrow with additional management if needed, given patient's prior intervention.  Check echocardiogram.  Heparin drip for now ultimately transition to Eliquis for lifelong anticoagulation.  Patient understands she will need lifelong anticoagulation at this point.  2.  For GERD: Protonix.  3.  For anxiety/depression: Continue fluoxetine.  Ativan as needed (also a home medication).  4.  For history of POTS: Complicates assessment.  Monitor hemodynamics.  5.  For hypertension: Does not appear to have been on home medications.  Monitor at this point.  I am more concerned about the possibility of her developing hypotension.    Patient is critically ill secondary to tenuous hemodynamic status in the setting of saddle pulmonary embolism with some evidence suggesting right heart strain and has high risk of life-threatening decline in condition.  As such, she requires continuous monitoring frequent reassessment for consideration of adjustment in management to minimize this risk.  I personally reassessed her multiple times today.    Critical care time : 49 minutes spent by me personally.(This excludes time spent performing separately reportable procedures and services). Critical care time includes high complexity decision making to assess, manipulate, and support vital organ system failure in this individual who has impairment of one or more vital organ systems such that there is a high probability of imminent or life threatening deterioration in the patient’s condition.    Electronically signed by:  Johnny Panchal MD  06/12/23  23:45 EDT    Please note that portions of this note were completed with [employer] - a voice recognition program.

## 2023-06-13 NOTE — PROGRESS NOTES
INTENSIVIST   PROGRESS NOTE     Hospital:  LOS: 1 day     Ms. Urszula Gonzalez, 33 y.o. female is followed for a Chief Complaint of: Shortness of Breath      Subjective   S     Interval History:  Heart rate increases and she becomes symptomatic with any exertion.        The patient's relevant past medical, surgical and social history were reviewed and updated in Epic as appropriate.      ROS:   Constitutional: Negative for fever.   Respiratory: Positive for dyspnea.   Cardiovascular: Negative for chest pain.   Gastrointestinal: Negative for  nausea, vomiting and diarrhea.     Objective   O     Vitals:  Temp  Min: 97.9 °F (36.6 °C)  Max: 97.9 °F (36.6 °C)  BP  Min: 110/52  Max: 161/110  Pulse  Min: 56  Max: 115  Resp  Min: 22  Max: 22  SpO2  Min: 94 %  Max: 100 % No data recorded    Intake/Ouptut 24 hrs (7:00AM - 6:59 AM)  Intake & Output (last 3 days)     None          Medications (drips):  heparin, Last Rate: 13 Units/kg/hr (06/13/23 0806)  Pharmacy to Dose Heparin  sodium chloride          Physical Examination  Telemetry:  Normal sinus rhythm.    Constitutional:  No acute distress.  Resting in bed on nasal cannula.    Eyes: No scleral icterus.   PERRL, EOM intact.    Neck:  Supple, FROM   Cardiovascular: Normal rate, regular and rhythm. Normal heart sounds.  No murmurs, gallop or rub.   Respiratory: No respiratory distress. Normal respiratory effort.  Normal breath sounds  Clear to auscultation   Abdominal:  Soft. No masses. Nontender. No distension. No HSM.   Extremities: No digital cyanosis. No clubbing.  No peripheral edema.   Skin: No rashes, lesions or ulcers   Neurological:   Alert and Oriented to person, place, and time.              Results from last 7 days   Lab Units 06/13/23  0545 06/12/23  2101   WBC 10*3/mm3 9.99 9.82   HEMOGLOBIN g/dL 9.5* 10.5*   MCV fL 75.7* 77.3*   PLATELETS 10*3/mm3 239 241     Results from last 7 days   Lab Units 06/13/23  0113 06/12/23  2101   SODIUM mmol/L 138 142    POTASSIUM mmol/L 3.8 3.5   CO2 mmol/L 19.0* 19.0*   CREATININE mg/dL 0.67 0.72   GLUCOSE mg/dL 99 107*   MAGNESIUM mg/dL 2.1  --    PHOSPHORUS mg/dL 3.7  --      Estimated Creatinine Clearance: 159.9 mL/min (by C-G formula based on SCr of 0.67 mg/dL).  Results from last 7 days   Lab Units 06/12/23  2101   ALK PHOS U/L 70   BILIRUBIN mg/dL 0.2   ALT (SGPT) U/L 17   AST (SGOT) U/L 15             Images:  Imaging Results (Last 24 Hours)     Procedure Component Value Units Date/Time    CT Angiogram Chest [[id number]] Collected: 06/12/23 2227     Updated: 06/12/23 2236    Narrative:      CT ANGIOGRAM CHEST    Date of Exam: 6/12/2023 10:20 PM EDT    Indication: palpitations.    Comparison: Chest x-ray 6/12/2023. CT    Technique: CTA of the chest was performed after the uneventful intravenous administration of 85 mL 0 370 . Reconstructed coronal and sagittal images were also obtained. In addition, a 3-D volume rendered image was created for interpretation. Automated   exposure control and iterative reconstruction methods were used.      Findings:  *Pulmonary Arteries: Large saddle embolus extending into bilateral central pulmonary arteries and its distal branches.  *Aorta: No acute thoracic aortic abnormalities.   *Lymphadenopathy: No thoracic lymphadenopathy.  *Lungs: No focal consolidation. No pleural effusions.  *Pneumothorax: None.  *Heart: Heart size normal without pericardial effusion.  *Bones: No acute fractures or dislocations. No osseous destructive lesions.   *      Impression:      1.Large saddle embolus extending into bilateral central pulmonary arteries and its distal branches.  2.No acute pulmonary findings.      Case discussed with Dr. PATRICIA PERALES @ 6/12/2023 10:32 PM EDT.              Electronically Signed: Alexys Negron    6/12/2023 10:33 PM EDT    Workstation ID: [id number]    XR Chest 1 View [[id number]] Collected: 06/12/23 2022     Updated: 06/12/23 2026    Narrative:      XR CHEST 1 VW    Date of  Exam: 6/12/2023 7:41 PM EDT    Indication: Chest pain    Comparison: 11/1/2022.    Findings:  The heart size is normal.  The pulmonary vascular markings are normal.  The lungs and pleural are clear.  The bony thorax is normal for age.      Impression:      Impression:  No active disease.      Electronically Signed: Gordon Tidwell    6/12/2023 8:23 PM EDT    Workstation ID: [id number]            Results: Reviewed.  I reviewed the patient's new laboratory and imaging results.  I independently reviewed the patient's new images.    Medications: Reviewed.    Assessment & Plan   A / P     Ms. Gonzalez is a 32yo F with a history of HTN, GERD, anxiety/depression, POTS, obesity with prior gastric sleeve surgery, prior DVT/PE (September 2022) s/p mechanical thrombectomy for complex left iliofemoral DVT and IVC DVT with left common iliac vein stent placement and treated for 6 months with Elqius after extensive workup who presented with recurrent PE.     CT imaging showed a large saddle embolus extending into the bilateral central pulmonary arteries and its distal branches.     Echocardiogram performed and showed normal EF, LVH, and mildly elevated RVSP with no evidence of right heart strain.     Cardiology is following.     Nutrition:   Diet: Regular/House Diet; Texture: Regular Texture (IDDSI 7); Fluid Consistency: Thin (IDDSI 0)  Advance Directives:   Code Status and Medical Interventions:   Ordered at: 06/12/23 6629     Code Status (Patient has no pulse and is not breathing):    CPR (Attempt to Resuscitate)     Medical Interventions (Patient has pulse or is breathing):    Full Support       Active Hospital Problems    Diagnosis    • **Acute saddle pulmonary embolism without acute cor pulmonale    • Gastroesophageal reflux disease without esophagitis        Assessment / Plan:    1. Continue heparin infusion per Cardiology. Currently on hold.   2. NPO  3. I have discussed the case with Dr. Martinez and agree that the risk of mechanical  thrombectomy are less than the possible benefit of preventing resultant pulmonary hypertension from her large clot burden.   4. Continue to monitor in the ICU after the procedure  5. AM labs    High level of risk due to:  severe exacerbation of chronic illness and illness with threat to life or bodily function.    Plan of care and goals reviewed during interdisciplinary rounds.  I discussed the patient's findings and my recommendations with nursing staff and consulting provider      aMy Ervin,     Intensive Care Medicine and Pulmonary Medicine

## 2023-06-13 NOTE — ED PROVIDER NOTES
Subjective   History of Present Illness  33-year-old female who presents for evaluation of palpitations and shortness of breath.  The patient reports for the last few days she has felt as if her heart rate is beating fast and she is felt more short of breath.  She states that the symptoms more prominent when she is up walking around.  She reports a previous history of DVT and pulmonary embolism.  She reports that in August of last year when she had COVID she also was diagnosed with a pulmonary embolism.  She took anticoagulation for Eliquis until March of this year.  She was cleared by Dr. Davey at the hematology department and has not been on any Eliquis since March, 3 months.  She denies fever or infectious symptoms.  She denies sick contacts.  No reported chest or abdominal pain.  No reported change in bowel or urinary function.  No new medications.  No other acute complaints.    Review of Systems   Constitutional:  Positive for fatigue. Negative for chills and fever.   HENT:  Negative for congestion, ear pain, postnasal drip, sinus pressure and sore throat.    Eyes:  Negative for pain, redness and visual disturbance.   Respiratory:  Positive for shortness of breath. Negative for cough and chest tightness.    Cardiovascular:  Positive for palpitations. Negative for chest pain and leg swelling.   Gastrointestinal:  Negative for abdominal pain, anal bleeding, blood in stool, diarrhea, nausea and vomiting.   Endocrine: Negative for polydipsia and polyuria.   Genitourinary:  Negative for difficulty urinating, dysuria, frequency and urgency.   Musculoskeletal:  Negative for arthralgias, back pain and neck pain.   Skin:  Negative for pallor and rash.   Allergic/Immunologic: Negative for environmental allergies and immunocompromised state.   Neurological:  Negative for dizziness, weakness and headaches.   Hematological:  Negative for adenopathy.   Psychiatric/Behavioral:  Negative for confusion, self-injury and  suicidal ideas. The patient is not nervous/anxious.    All other systems reviewed and are negative.    Past Medical History:   Diagnosis Date    Anemia     Anxiety     Bradycardia, unspecified     Depression     DVT (deep venous thrombosis)     Left Lower Extremity.    Dysfunctional gallbladder     GERD (gastroesophageal reflux disease)     Gestational hypertension     H. pylori infection     Hypertension     Hyponatremia     Ovarian cyst     POTS (postural orthostatic tachycardia syndrome)     Pulmonary emboli     Tachycardia        Allergies   Allergen Reactions    Ciprofloxacin Hives       Past Surgical History:   Procedure Laterality Date     SECTION  2018     SECTION PRIMARY  2016     SECTION WITH TUBAL Bilateral 2021    Procedure:  SECTION REPEAT WITH TUBAL;  Surgeon: Vinayak Gutierrez MD;  Location: [facility] LABOR DELIVERY;  Service: Obstetrics/Gynecology;  Laterality: Bilateral;    CHOLECYSTECTOMY N/A 3/13/2020    Procedure: CHOLECYSTECTOMY LAPAROSCOPIC;  Surgeon: Toby Murphy MD;  Location: [facility];  Service: General;  Laterality: N/A;    COLONOSCOPY      ENDOSCOPY      FEMORAL THROMBECTOMY/EMBOLECTOMY Left 2022    Procedure: venogram, initiation of TPA at left leg and IVC;  Surgeon: Cooper Zhao MD;  Location: [facility];  Service: Vascular;  Laterality: Left;    FEMORAL THROMBECTOMY/EMBOLECTOMY Left 9/10/2022    Procedure: ILEOFEMORAL EMBOLECTOMY, VENOGRAM;  Surgeon: Cooper Zhao MD;  Location: [facility];  Service: Vascular;  Laterality: Left;  FLUORO: 18M48S  DOSE: 2900 MGY  CONTRAST: 70 ML    FEMORAL THROMBECTOMY/EMBOLECTOMY Left 2022    Procedure: ULTRASOUND GUIDED BILATERAL FEMORAL VEIN ACCESS, LEFT ILIAC THROMBECTOMY WITH INARI, VENOGRAPHY, AND PLACEMENT OF 20X40 WALL STENT;  Surgeon: Cooper Zhao MD;  Location: [facility];  Service: Vascular;  Laterality: Left;  fl- 10 MIN  6 SEC  dose- 582  MGY  contrast- 80 ML       GASTRIC SLEEVE LAPAROSCOPIC      TONSILLECTOMY      WISDOM TOOTH EXTRACTION         Family History   Problem Relation Age of Onset    Thyroid disease Mother     Graves' disease Mother     Myasthenia gravis Mother     Hypertension Father     No Known Problems Sister     Hypertension Brother     Hypertension Brother     Diabetes Maternal Grandmother     Cirrhosis Maternal Grandmother     Cancer Maternal Grandmother     No Known Problems Paternal Grandmother     Cancer Paternal Grandfather     No Known Problems Daughter     No Known Problems Son        Social History     Socioeconomic History    Marital status:      Spouse name: nate gary    Number of children: 2    Highest education level: High school graduate   Tobacco Use    Smoking status: Never    Smokeless tobacco: Never   Vaping Use    Vaping Use: Never used   Substance and Sexual Activity    Alcohol use: Not Currently     Comment: social    Drug use: Never    Sexual activity: Defer     Partners: Male     Birth control/protection: None, Surgical           Objective   Physical Exam  Vitals and nursing note reviewed.   Constitutional:       General: She is not in acute distress.     Appearance: Normal appearance. She is well-developed. She is not toxic-appearing or diaphoretic.   HENT:      Head: Normocephalic and atraumatic.      Right Ear: External ear normal.      Left Ear: External ear normal.      Nose: Nose normal.   Eyes:      General: Lids are normal.      Pupils: Pupils are equal, round, and reactive to light.   Neck:      Trachea: No tracheal deviation.   Cardiovascular:      Rate and Rhythm: Normal rate and regular rhythm. Tachycardia present.      Pulses: No decreased pulses.      Heart sounds: Normal heart sounds. No murmur heard.    No friction rub. No gallop.   Pulmonary:      Effort: Pulmonary effort is normal. No respiratory distress.      Breath sounds: Normal breath sounds. No decreased breath  sounds, wheezing, rhonchi or rales.   Abdominal:      General: Bowel sounds are normal.      Palpations: Abdomen is soft.      Tenderness: There is no abdominal tenderness. There is no guarding or rebound.   Musculoskeletal:         General: No deformity. Normal range of motion.      Cervical back: Normal range of motion and neck supple.   Lymphadenopathy:      Cervical: No cervical adenopathy.   Skin:     General: Skin is warm and dry.      Findings: No rash.   Neurological:      Mental Status: She is alert and oriented to person, place, and time.      Cranial Nerves: No cranial nerve deficit.      Sensory: No sensory deficit.   Psychiatric:         Speech: Speech normal.         Behavior: Behavior normal.         Thought Content: Thought content normal.         Judgment: Judgment normal.       Critical Care  Performed by: Indigo Novoa MD  Authorized by: Indigo Novoa MD     Critical care provider statement:     Critical care time (minutes):  35    Critical care time was exclusive of:  Separately billable procedures and treating other patients    Critical care was necessary to treat or prevent imminent or life-threatening deterioration of the following conditions:  Respiratory failure    Critical care was time spent personally by me on the following activities:  Development of treatment plan with patient or surrogate, evaluation of patient's response to treatment, examination of patient, obtaining history from patient or surrogate, discussions with consultants, ordering and performing treatments and interventions, ordering and review of laboratory studies, ordering and review of radiographic studies, pulse oximetry, re-evaluation of patient's condition and review of old charts           ED Course                                           Medical Decision Making  Differential diagnosis includes acute viral illness, pneumonia, dehydration, anemia, pulmonary embolism, other unspecified  etiology.    Lab evaluation shows urine without infection.  Normal kidney function and nonconcerning electrolytes.  Normal H&H.  Slightly elevated troponin, slightly elevated D-dimer.    CT angiogram of the chest was performed and shows pulmonary embolism but no signs of right heart strain.    I have ordered heparin bolus and infusion.    Discussed the patient with the intensivist, Dr. Panchal, who will consult for admission.    Problems Addressed:  Acute saddle pulmonary embolism without acute cor pulmonale: complicated acute illness or injury that poses a threat to life or bodily functions  Dyspnea, unspecified type: complicated acute illness or injury    Amount and/or Complexity of Data Reviewed  External Data Reviewed: labs, radiology, ECG and notes.  Labs: ordered. Decision-making details documented in ED Course.  Radiology: ordered and independent interpretation performed. Decision-making details documented in ED Course.  ECG/medicine tests: ordered.    Risk  OTC drugs.  Prescription drug management.  Decision regarding hospitalization.        Final diagnoses:   Acute saddle pulmonary embolism without acute cor pulmonale   Dyspnea, unspecified type       ED Disposition  ED Disposition       ED Disposition   Decision to Admit    Condition   --    Comment   Level of Care: Critical Care [6]   Diagnosis: Acute saddle pulmonary embolism without acute cor pulmonale [[id number]]   Admitting Physician: FRANCES PANCHAL [[id number]]   Certification: I Certify That Inpatient Hospital Services Are Medically Necessary For Greater Than 2 Midnights                 No follow-up provider specified.       Medication List      No changes were made to your prescriptions during this visit.            Indigo Novoa MD  06/13/23 0058

## 2023-06-13 NOTE — PAYOR COMM NOTE
"ID: [id number]   : 1990      Acute saddle pulmonary embolism without acute cor pulmonale [I26.92]   Johnny Panchal MD (NPI: [id number])     Right heart Strain:  Presented with tachycardia likely indicating at least transiently some right heart strain, though her CT angiogram subsequently did not show radiographic evidence of right heart strain.   EKG is also suggestive of right heart strain with an S1Q3T3 pattern       Utilization Review  Phone [phone]  Fax [phone]    [place] [address]         Maren Gonzalez (33 y.o. Female)       Date of Birth   1990    Social Security Number       Address   [address] [place] [address]    Home Phone   [phone]    MRN   [id number]       [facility]    Marital Status                               Admission Date   23    Admission Type   Emergency    Admitting Provider   Johnny Panchal MD    Attending Provider   Johnny Panchal MD    Department, Room/Bed   [facility] 2B ICU, N231/1       Discharge Date       Discharge Disposition       Discharge Destination                                 Attending Provider: Johnny Panchal MD    Allergies: Ciprofloxacin    Isolation: None   Infection: None   Code Status: CPR    Ht: 167.6 cm (66\")   Wt: 118 kg (260 lb)    Admission Cmt: None   Principal Problem: Acute saddle pulmonary embolism without acute cor pulmonale [I26.92]                   Active Insurance as of 2023       Primary Coverage       Payor Plan Insurance Group Employer/Plan Group    [employer] [place] [employer] [place]ICA[place]        Payor Plan Address Payor Plan Phone Number Payor Plan Fax Number Effective Dates    [address] [address] [phone]  2020 - None Entered    [facility] [id number]         Subscriber Name Subscriber Birth Date Member ID       MAREN GONZALEZ 1990 [id number]                     Emergency " Contacts        (Rel.) Home Phone Work Phone Mobile Phone    MICHAELA MUNOZ (Mother) [phone] -- [phone]    TONG BLACKBURN (Spouse) [phone] -- [phone]              [place]: JOANNA [id number] Tax ID [id number]  Insurance Information                  [facility] MEDICAID Phone: [phone]    Subscriber: Carlos Urszulasowmya Esteban Subscriber#: [id number]    Group#: -- Precert#: --             History & Physical        Johnny Panchal MD at 06/12/23 3608          Pulmonary/Critical Care History and Physical Exam     LOS: 0 days   Patient Care Team:  Cha Fernández APRN as PCP - General (Family Medicine)  Raymond Davey MD as Consulting Physician (Hematology and Oncology)  Rafiq Martinez MD as Consulting Physician (Cardiology)    Chief Complaint:    Chief Complaint   Patient presents with    Rapid Heart Rate    Shortness of Breath       Subjective    HPI:     33 y.o. 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 Davey with hematology), who presents with a 2 to 3-day history of palpitations/tachycardia and progressive exertional dyspnea and is found to have a saddle pulmonary embolism on CT angiogram of the chest without radiographic evidence of right heart strain.    Patient denies family history of thromboembolic disease.  She is active with no recent periods of inactivity.  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  Has had previous extensive hypercoagulable work-up.    Patient was tachycardic on arrival with heart rate in the low 100s.  Currently her heart rate is  ranging from the 70s to 90s.  She was started on a heparin drip by the emergency department.    History taken from: Patient    Past Medical History:   Diagnosis Date    Anemia     Anxiety     Bradycardia, unspecified     Depression     DVT (deep venous thrombosis)     Left Lower Extremity.    Dysfunctional gallbladder     GERD (gastroesophageal reflux disease)     Gestational hypertension     H. pylori infection     Hypertension     Hyponatremia     Ovarian cyst     POTS (postural orthostatic tachycardia syndrome)     Pulmonary emboli     Tachycardia        Past Surgical History:   Procedure Laterality Date     SECTION  2018     SECTION PRIMARY  2016     SECTION WITH TUBAL Bilateral 2021    Procedure:  SECTION REPEAT WITH TUBAL;  Surgeon: Vinayak Gutierrez MD;  Location: [facility] LABOR DELIVERY;  Service: Obstetrics/Gynecology;  Laterality: Bilateral;    CHOLECYSTECTOMY N/A 3/13/2020    Procedure: CHOLECYSTECTOMY LAPAROSCOPIC;  Surgeon: Toby Murphy MD;  Location: [facility];  Service: General;  Laterality: N/A;    COLONOSCOPY      ENDOSCOPY      FEMORAL THROMBECTOMY/EMBOLECTOMY Left 2022    Procedure: venogram, initiation of TPA at left leg and IVC;  Surgeon: Cooper Zhao MD;  Location: [facility];  Service: Vascular;  Laterality: Left;    FEMORAL THROMBECTOMY/EMBOLECTOMY Left 9/10/2022    Procedure: ILEOFEMORAL EMBOLECTOMY, VENOGRAM;  Surgeon: Cooper Zhao MD;  Location: [facility];  Service: Vascular;  Laterality: Left;  FLUORO: 18M48S  DOSE: 2900 MGY  CONTRAST: 70 ML    FEMORAL THROMBECTOMY/EMBOLECTOMY Left 2022    Procedure: ULTRASOUND GUIDED BILATERAL FEMORAL VEIN ACCESS, LEFT ILIAC THROMBECTOMY WITH INARI, VENOGRAPHY, AND PLACEMENT OF 20X40 WALL STENT;  Surgeon: Cooper Zhao MD;  Location: [facility];  Service: Vascular;  Laterality: Left;  fl- 10 MIN  6 SEC  dose- 582 MGY  contrast- 80 ML       GASTRIC  SLEEVE LAPAROSCOPIC      TONSILLECTOMY      WISDOM TOOTH EXTRACTION         Family History   Problem Relation Age of Onset    Thyroid disease Mother     Graves' disease Mother     Myasthenia gravis Mother     Hypertension Father     No Known Problems Sister     Hypertension Brother     Hypertension Brother     Diabetes Maternal Grandmother     Cirrhosis Maternal Grandmother     Cancer Maternal Grandmother     No Known Problems Paternal Grandmother     Cancer Paternal Grandfather     No Known Problems Daughter     No Known Problems Son        Social History     Socioeconomic History    Marital status:      Spouse name: nate gary    Number of children: 2    Highest education level: High school graduate   Tobacco Use    Smoking status: Never    Smokeless tobacco: Never   Vaping Use    Vaping Use: Never used   Substance and Sexual Activity    Alcohol use: Not Currently     Comment: social    Drug use: Never    Sexual activity: Defer     Partners: Male     Birth control/protection: None, Surgical       Allergies   Allergen Reactions    Ciprofloxacin Hives       Past Medical History/Family History/Social History were reviewed by me and updates were made.     Review of Systems:    Review of 14 systems was completed with positives and pertinent negatives noted in the subjective section.  All other systems reviewed and are negative.         Objective    Vital Signs  Temp:  [97.9 °F (36.6 °C)] 97.9 °F (36.6 °C)  Heart Rate:  [] 80  Resp:  [22] 22  BP: (155-161)/(101-110) 155/101  Body mass index is 41.97 kg/m².     No intake or output data in the 24 hours ending 06/12/23 4448   Physical Exam:     General Appearance:    Alert, cooperative, in no acute distress   Head:    Normocephalic, without obvious abnormality, atraumatic   Eyes:            Lids and lashes normal, conjunctivae and sclerae normal,    no icterus, no pallor, corneas clear, PER   ENMT:   Ears appear intact with no abnormalities noted.       Lips normal   Neck:   Trachea midline, no thyromegaly,      no JVD   Lungs/resp:     Normal effort, symmetric chest rise, no crepitus, clear to      auscultation bilaterally, no chest wall tenderness                  Heart/CV:    Regular rhythm and normal rate, normal S1 and S2, no         murmur   Abdomen/GI:     Normal bowel sounds, no masses, no organomegaly, soft,    nontender, nondistended   G/U:     Deferred   Extremities/MSK:   No clubbing or cyanosis.  Minimal/trace bilateral lower extremity edema.  Normal tone.  No deformities.    Pulses:   Pulses palpable and equal bilaterally   Skin:   No bleeding, bruising or rash   Hem/Lymph:   No cervical or supraclavicular adenopathy.    Neurologic:   Moves all extremities with no obvious focal motor deficit.  Cranial nerves 2 - 12 grossly intact            Psychiatric:  Normal mood and with anxious affect    The above findings are documentation of my personal physical exam findings from today.   Electronically signed by:  Johnny Panchal MD  06/12/23  23:45 EDT     Results Review:     I reviewed the patient's new clinical results.   Results from last 7 days   Lab Units 06/12/23  2101   SODIUM mmol/L 142   POTASSIUM mmol/L 3.5   CHLORIDE mmol/L 110*   CO2 mmol/L 19.0*   BUN mg/dL 6   CREATININE mg/dL 0.72   CALCIUM mg/dL 9.2   BILIRUBIN mg/dL 0.2   ALK PHOS U/L 70   ALT (SGPT) U/L 17   AST (SGOT) U/L 15   GLUCOSE mg/dL 107*     Results from last 7 days   Lab Units 06/12/23  2101   WBC 10*3/mm3 9.82   HEMOGLOBIN g/dL 10.5*   HEMATOCRIT % 37.4   PLATELETS 10*3/mm3 241           I reviewed the patient's new imaging including images and reports.    CTA chest reviewed and shows a large saddle pulmonary embolism with extension down into the proximal lower lobes.  No evidence of right heart strain.  Radiologist's impression follows:    IMPRESSION:  1.Large saddle embolus extending into bilateral central pulmonary arteries and its distal branches.  2.No acute pulmonary  findings.    EKG shows sinus tachycardia with S1Q3T3 pattern of right heart strain which has been present on prior EKGs back to 2021.    Medication Review:   aspirin, 324 mg, Oral, Once  senna-docusate sodium, 2 tablet, Oral, BID  sodium chloride, 10 mL, Intravenous, Q12H      heparin, 13 Units/kg/hr, Last Rate: 13 Units/kg/hr (06/12/23 8455)  lactated ringers, 100 mL/hr  Pharmacy to Dose Heparin,         Assessment & Plan    Active Hospital Problems    Diagnosis     **Acute saddle pulmonary embolism without acute cor pulmonale    GERD  Anxiety/depression  Hypertension  H/O POTS    33 y.o. 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 Davey with hematology), who presents with a 2 to 3-day history of palpitations/tachycardia and progressive exertional dyspnea and is found to have a saddle pulmonary embolism on CT angiogram of the chest without radiographic evidence of right heart strain.    Patient is without a family history of thromboembolic disease and has had a negative hypercoagulable work-up in the past.    Due to the recurrence of thromboembolic disease, patient will need lifelong anticoagulation.  We will check a lower extremity duplex to determine if she has residual lower extremity DVT, and check echocardiogram.    Given her saddle pulmonary embolism, along with her history of prior right heart strain evidenced by RV to LV ratio greater than 1 during her prior admission for PE, I think it is reasonable to admit her to the intensive care unit for close monitoring.  It is conceivable that the patient could develop worsening obstruction if her clots  were to distally embolize in the short-term.  Given her currently stable hemodynamics however, I do not feel she needs thrombectomy or thrombolysis, and the risks of attempting these strategies likely outweigh potential benefit.    Plan:  1.  For acute submassive pulmonary embolism: Presented with tachycardia likely indicating at least transiently some right heart strain, though her CT angiogram subsequently did not show radiographic evidence of right heart strain.  EKG is also suggestive of right heart strain with an S1Q3T3 pattern, though this has been present back to at least March 2021 (prior to initial presentation for pulmonary embolism).  I would recommend a sleep study if this has not been done in the past.  Check lower extremity duplex tomorrow with additional management if needed, given patient's prior intervention.  Check echocardiogram.  Heparin drip for now ultimately transition to Eliquis for lifelong anticoagulation.  Patient understands she will need lifelong anticoagulation at this point.  2.  For GERD: Protonix.  3.  For anxiety/depression: Continue fluoxetine.  Ativan as needed (also a home medication).  4.  For history of POTS: Complicates assessment.  Monitor hemodynamics.  5.  For hypertension: Does not appear to have been on home medications.  Monitor at this point.  I am more concerned about the possibility of her developing hypotension.    Patient is critically ill secondary to tenuous hemodynamic status in the setting of saddle pulmonary embolism with some evidence suggesting right heart strain and has high risk of life-threatening decline in condition.  As such, she requires continuous monitoring frequent reassessment for consideration of adjustment in management to minimize this risk.  I personally reassessed her multiple times today.    Critical care time : 49 minutes spent by me personally.(This excludes time spent performing separately reportable procedures and services). Critical care  time includes high complexity decision making to assess, manipulate, and support vital organ system failure in this individual who has impairment of one or more vital organ systems such that there is a high probability of imminent or life threatening deterioration in the patient’s condition.    Electronically signed by:  Johnny Panchal MD  06/12/23  23:45 EDT    Please note that portions of this note were completed with [employer] - a voice recognition program.     Electronically signed by Johnny Panchal MD at 06/13/23 0123          Emergency Department Notes        Indigo Novoa MD at 06/13/23 0055        Procedure Orders    1. Critical Care [[id number]] ordered by Indigo Novoa MD                 Subjective   History of Present Illness  33-year-old female who presents for evaluation of palpitations and shortness of breath.  The patient reports for the last few days she has felt as if her heart rate is beating fast and she is felt more short of breath.  She states that the symptoms more prominent when she is up walking around.  She reports a previous history of DVT and pulmonary embolism.  She reports that in August of last year when she had COVID she also was diagnosed with a pulmonary embolism.  She took anticoagulation for Eliquis until March of this year.  She was cleared by Dr. Davey at the hematology department and has not been on any Eliquis since March, 3 months.  She denies fever or infectious symptoms.  She denies sick contacts.  No reported chest or abdominal pain.  No reported change in bowel or urinary function.  No new medications.  No other acute complaints.    Review of Systems   Constitutional:  Positive for fatigue. Negative for chills and fever.   HENT:  Negative for congestion, ear pain, postnasal drip, sinus pressure and sore throat.    Eyes:  Negative for pain, redness and visual disturbance.   Respiratory:  Positive for shortness of breath. Negative for cough  and chest tightness.    Cardiovascular:  Positive for palpitations. Negative for chest pain and leg swelling.   Gastrointestinal:  Negative for abdominal pain, anal bleeding, blood in stool, diarrhea, nausea and vomiting.   Endocrine: Negative for polydipsia and polyuria.   Genitourinary:  Negative for difficulty urinating, dysuria, frequency and urgency.   Musculoskeletal:  Negative for arthralgias, back pain and neck pain.   Skin:  Negative for pallor and rash.   Allergic/Immunologic: Negative for environmental allergies and immunocompromised state.   Neurological:  Negative for dizziness, weakness and headaches.   Hematological:  Negative for adenopathy.   Psychiatric/Behavioral:  Negative for confusion, self-injury and suicidal ideas. The patient is not nervous/anxious.    All other systems reviewed and are negative.    Past Medical History:   Diagnosis Date    Anemia     Anxiety     Bradycardia, unspecified     Depression     DVT (deep venous thrombosis)     Left Lower Extremity.    Dysfunctional gallbladder     GERD (gastroesophageal reflux disease)     Gestational hypertension     H. pylori infection     Hypertension     Hyponatremia     Ovarian cyst     POTS (postural orthostatic tachycardia syndrome)     Pulmonary emboli     Tachycardia        Allergies   Allergen Reactions    Ciprofloxacin Hives       Past Surgical History:   Procedure Laterality Date     SECTION  2018     SECTION PRIMARY  2016     SECTION WITH TUBAL Bilateral 2021    Procedure:  SECTION REPEAT WITH TUBAL;  Surgeon: Vinayak Gutierrez MD;  Location:  [facility]OR DELIVERY;  Service: Obstetrics/Gynecology;  Laterality: Bilateral;    CHOLECYSTECTOMY N/A 3/13/2020    Procedure: CHOLECYSTECTOMY LAPAROSCOPIC;  Surgeon: Toby Murphy MD;  Location:  [facility];  Service: General;  Laterality: N/A;    COLONOSCOPY      ENDOSCOPY      FEMORAL THROMBECTOMY/EMBOLECTOMY Left 2022    Procedure:  venogram, initiation of TPA at left leg and IVC;  Surgeon: Cooper Zhao MD;  Location: [facility];  Service: Vascular;  Laterality: Left;    FEMORAL THROMBECTOMY/EMBOLECTOMY Left 9/10/2022    Procedure: ILEOFEMORAL EMBOLECTOMY, VENOGRAM;  Surgeon: Cooper Zhao MD;  Location: [facility];  Service: Vascular;  Laterality: Left;  FLUORO: 18M48S  DOSE: 2900 MGY  CONTRAST: 70 ML    FEMORAL THROMBECTOMY/EMBOLECTOMY Left 9/12/2022    Procedure: ULTRASOUND GUIDED BILATERAL FEMORAL VEIN ACCESS, LEFT ILIAC THROMBECTOMY WITH INARI, VENOGRAPHY, AND PLACEMENT OF 20X40 WALL STENT;  Surgeon: Cooper Zhao MD;  Location: [facility];  Service: Vascular;  Laterality: Left;  fl- 10 MIN  6 SEC  dose- 582 MGY  contrast- 80 ML       GASTRIC SLEEVE LAPAROSCOPIC      TONSILLECTOMY      WISDOM TOOTH EXTRACTION         Family History   Problem Relation Age of Onset    Thyroid disease Mother     Graves' disease Mother     Myasthenia gravis Mother     Hypertension Father     No Known Problems Sister     Hypertension Brother     Hypertension Brother     Diabetes Maternal Grandmother     Cirrhosis Maternal Grandmother     Cancer Maternal Grandmother     No Known Problems Paternal Grandmother     Cancer Paternal Grandfather     No Known Problems Daughter     No Known Problems Son        Social History     Socioeconomic History    Marital status:      Spouse name: nate gary    Number of children: 2    Highest education level: High school graduate   Tobacco Use    Smoking status: Never    Smokeless tobacco: Never   Vaping Use    Vaping Use: Never used   Substance and Sexual Activity    Alcohol use: Not Currently     Comment: social    Drug use: Never    Sexual activity: Defer     Partners: Male     Birth control/protection: None, Surgical           Objective   Physical Exam  Vitals and nursing note reviewed.   Constitutional:       General: She is not in acute distress.     Appearance: Normal  appearance. She is well-developed. She is not toxic-appearing or diaphoretic.   HENT:      Head: Normocephalic and atraumatic.      Right Ear: External ear normal.      Left Ear: External ear normal.      Nose: Nose normal.   Eyes:      General: Lids are normal.      Pupils: Pupils are equal, round, and reactive to light.   Neck:      Trachea: No tracheal deviation.   Cardiovascular:      Rate and Rhythm: Normal rate and regular rhythm. Tachycardia present.      Pulses: No decreased pulses.      Heart sounds: Normal heart sounds. No murmur heard.    No friction rub. No gallop.   Pulmonary:      Effort: Pulmonary effort is normal. No respiratory distress.      Breath sounds: Normal breath sounds. No decreased breath sounds, wheezing, rhonchi or rales.   Abdominal:      General: Bowel sounds are normal.      Palpations: Abdomen is soft.      Tenderness: There is no abdominal tenderness. There is no guarding or rebound.   Musculoskeletal:         General: No deformity. Normal range of motion.      Cervical back: Normal range of motion and neck supple.   Lymphadenopathy:      Cervical: No cervical adenopathy.   Skin:     General: Skin is warm and dry.      Findings: No rash.   Neurological:      Mental Status: She is alert and oriented to person, place, and time.      Cranial Nerves: No cranial nerve deficit.      Sensory: No sensory deficit.   Psychiatric:         Speech: Speech normal.         Behavior: Behavior normal.         Thought Content: Thought content normal.         Judgment: Judgment normal.       Critical Care  Performed by: Indigo Novoa MD  Authorized by: Indigo Novoa MD     Critical care provider statement:     Critical care time (minutes):  35    Critical care time was exclusive of:  Separately billable procedures and treating other patients    Critical care was necessary to treat or prevent imminent or life-threatening deterioration of the following conditions:  Respiratory failure     Critical care was time spent personally by me on the following activities:  Development of treatment plan with patient or surrogate, evaluation of patient's response to treatment, examination of patient, obtaining history from patient or surrogate, discussions with consultants, ordering and performing treatments and interventions, ordering and review of laboratory studies, ordering and review of radiographic studies, pulse oximetry, re-evaluation of patient's condition and review of old charts          ED Course                                           Medical Decision Making  Differential diagnosis includes acute viral illness, pneumonia, dehydration, anemia, pulmonary embolism, other unspecified etiology.    Lab evaluation shows urine without infection.  Normal kidney function and nonconcerning electrolytes.  Normal H&H.  Slightly elevated troponin, slightly elevated D-dimer.    CT angiogram of the chest was performed and shows pulmonary embolism but no signs of right heart strain.    I have ordered heparin bolus and infusion.    Discussed the patient with the intensivist, Dr. Panchal, who will consult for admission.    Problems Addressed:  Acute saddle pulmonary embolism without acute cor pulmonale: complicated acute illness or injury that poses a threat to life or bodily functions  Dyspnea, unspecified type: complicated acute illness or injury    Amount and/or Complexity of Data Reviewed  External Data Reviewed: labs, radiology, ECG and notes.  Labs: ordered. Decision-making details documented in ED Course.  Radiology: ordered and independent interpretation performed. Decision-making details documented in ED Course.  ECG/medicine tests: ordered.    Risk  OTC drugs.  Prescription drug management.  Decision regarding hospitalization.        Final diagnoses:   Acute saddle pulmonary embolism without acute cor pulmonale   Dyspnea, unspecified type       ED Disposition  ED Disposition       ED Disposition   Decision  to Admit    Condition   --    Comment   Level of Care: Critical Care [6]   Diagnosis: Acute saddle pulmonary embolism without acute cor pulmonale [[id number]]   Admitting Physician: FRANCES ALBERTS [[id number]]   Certification: I Certify That Inpatient Hospital Services Are Medically Necessary For Greater Than 2 Midnights                 No follow-up provider specified.       Medication List      No changes were made to your prescriptions during this visit.            Indigo Novoa MD  06/13/23 0058      Electronically signed by Indigo Novoa MD at 06/13/23 0058       Physician Progress Notes (last 24 hours)  Notes from 06/12/23 1105 through 06/13/23 1105   No notes of this type exist for this encounter.       Consult Notes (last 24 hours)  Notes from 06/12/23 1105 through 06/13/23 1105   No notes of this type exist for this encounter.

## 2023-06-13 NOTE — CONSULTS
[facility]  CARDIOLOGY CONSULTATION NOTE    Urszula Gonzalez  : 1990  MRN:[id number]  Home Phone:[phone]    Date of Admission:2023  Date of Consultation: 23  Primary Provider:  Cha Fernández APRN    Referring Provider: KERVIN Mcgraw  Reason for Consultation: Acute Saddle Pulmonary Embolism    IDENTIFICATION: A 33 y.o. female from [place]    CC:   Chief Complaint   Patient presents with    Rapid Heart Rate    Shortness of Breath          PROBLEM LIST:   Cardiac focused, problem list:  DVT/PE  Diagnosed with bilateral PE, LLE DVT, 2022  CT Abd/pelvis, 9/3/22 [place]: acute DVT within the left common femoral vein, left external iliac vein, left common iliac vein extending into the infrarenal IVC and almost to the level of renal veins.  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 LVEF 56-60%. RV size and function within normal limits. Cardiac valves structurally and functionally within normal limits.   Repeat CTA chest 2022:  Small clot burden extending from the distal right main pulmonary artery, nonocclusive.   LLE Venous Duplex 2023: normal study, no DVT  [facility] Admission 2023 for acute large saddle PE extending into bilateral central pulmonary arteries and its distal branches on CTA Chest  POTS during second pregnancy  Holter monitor 3/18/2021:  No significant arrhythmia. Events were NSR or sinus tach.   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 Davey with hematology  Hypertension  Postural orthostatic tachycardia syndrome  Obesity s/p gastric sleeve surgery  GERD        Active Hospital Problems    Diagnosis  POA    **Acute saddle pulmonary embolism without acute cor pulmonale [I26.92]  Yes    Gastroesophageal reflux disease without esophagitis [K21.9]  Yes         Acute saddle pulmonary embolism without acute cor pulmonale    Gastroesophageal reflux disease without esophagitis      ALLERGIES:   Allergies   Allergen Reactions    Ciprofloxacin Hives       HPI: Ms. Gonzalez is a 33-year-old female with the above medical history significant for history of prior DVT/PE in 2022 s/p mechanical thrombectomy for complex left iliofemoral DVT  who we are seeing in consultation for acute saddle PE.  She presented to [facility] ED around midnight this morning complaining of several day history of palpitations and progressive shortness of breath.  His previous history of DVT and PE in August last year when she had COVID.  She took Eliquis until 2023 and was cleared by hematology.  She denies family history of thromboembolic disease, chest pain, lightheadedness or syncopal episode CTA chest showed large saddle embolus extending into bilateral central pulmonary arteries and its distal branches. She reports she underwent testing for hypercoagulable states and testing was negative.  EKG showed sinus tachycardia. Telemetry during my visit showed sinus arrhythmia. Her vital signs have been stable with no hypoxia on  room air. She was started on heparin gtt. Echocardiogram and bilateral lower extremity duplex pending.      ROS: All systems have been reviewed and are negative with the exception of those mentioned in the HPI and problem list above.    HOME MEDICINES:   Current Outpatient Medications   Medication Instructions    apixaban (ELIQUIS) 5 mg, Oral, Every 12 Hours Scheduled    aspirin 81 mg, Oral, Daily    FLUoxetine (PROzac) 20 MG capsule No dose, route, or frequency recorded.    HYDROcodone-acetaminophen (NORCO) 5-325 MG per tablet 2 tablets, Oral, Every 4 Hours PRN    hydrOXYzine (ATARAX) 25 MG tablet TAKE 2 TABLETS BY MOUTH EVERY 6 HOURS AS NEEDED FOR ANXIETY    LORazepam (ATIVAN) 0.5 MG tablet No dose, route, or frequency recorded.    pantoprazole (PROTONIX) 40 mg, Oral, Daily       Surgical History:   Past Surgical History:   Procedure Laterality Date     SECTION  2018     SECTION PRIMARY  2016     SECTION WITH TUBAL Bilateral 2021    Procedure:  SECTION REPEAT WITH TUBAL;  Surgeon: Vinayak Gutierrez MD;  Location: [facility] LABOR DELIVERY;  Service: Obstetrics/Gynecology;  Laterality: Bilateral;    CHOLECYSTECTOMY N/A 3/13/2020    Procedure: CHOLECYSTECTOMY LAPAROSCOPIC;  Surgeon: Toby Murphy MD;  Location: [facility] OR;  Service: General;  Laterality: N/A;    COLONOSCOPY      ENDOSCOPY      FEMORAL THROMBECTOMY/EMBOLECTOMY Left 2022    Procedure: venogram, initiation of TPA at left leg and IVC;  Surgeon: Cooper Zhao MD;  Location: [facility];  Service: Vascular;  Laterality: Left;    FEMORAL THROMBECTOMY/EMBOLECTOMY Left 9/10/2022    Procedure: ILEOFEMORAL EMBOLECTOMY, VENOGRAM;  Surgeon: Cooper Zhao MD;  Location: [facility];  Service: Vascular;  Laterality: Left;  FLUORO: 18M48S  DOSE: 2900 MGY  CONTRAST: 70 ML    FEMORAL THROMBECTOMY/EMBOLECTOMY Left 2022    Procedure: ULTRASOUND GUIDED BILATERAL FEMORAL VEIN ACCESS, LEFT ILIAC  "THROMBECTOMY WITH INARI, VENOGRAPHY, AND PLACEMENT OF 20X40 WALL STENT;  Surgeon: Cooper Zhao MD;  Location: [facility];  Service: Vascular;  Laterality: Left;  fl- 10 MIN  6 SEC  dose- 582 MGY  contrast- 80 ML       GASTRIC SLEEVE LAPAROSCOPIC      TONSILLECTOMY      WISDOM TOOTH EXTRACTION         Social History:   Social History     Socioeconomic History    Marital status:      Spouse name: nate agry    Number of children: 2    Highest education level: High school graduate   Tobacco Use    Smoking status: Never    Smokeless tobacco: Never   Vaping Use    Vaping Use: Never used   Substance and Sexual Activity    Alcohol use: Not Currently     Comment: social    Drug use: Never    Sexual activity: Defer     Partners: Male     Birth control/protection: None, Surgical       Family History:   Family History   Problem Relation Age of Onset    Thyroid disease Mother     Graves' disease Mother     Myasthenia gravis Mother     Hypertension Father     No Known Problems Sister     Hypertension Brother     Hypertension Brother     Diabetes Maternal Grandmother     Cirrhosis Maternal Grandmother     Cancer Maternal Grandmother     No Known Problems Paternal Grandmother     Cancer Paternal Grandfather     No Known Problems Daughter     No Known Problems Son           Objective     /56   Pulse 68   Temp 97.9 °F (36.6 °C) (Oral)   Resp 22   Ht 167.6 cm (66\")   Wt 118 kg (260 lb)   SpO2 95%   BMI 41.97 kg/m²   No intake or output data in the 24 hours ending 06/13/23 0807    PHYSICAL EXAM:  CONSTITUTIONAL: Well nourished, cooperative, in no acute distress  CARDIOVASCULAR:  Regular rhythm and normal rate, no murmur, gallop, rub.   RESPIRATORY: Clear to auscultation, normal respiratory effort, no wheezing, rales or rhonchi  GI: Soft, nontender  EXTREMITIES: No gross deformities, no edema. Peripheral pulses are present and equal bilaterally  SKIN: Warm, dry. No bleeding, bruising or " rash  NEUROLOGICAL: No gross focal deficits  PSYCHIATRIC: Normal mood and affect. Behavior is normal     Labs/Diagnostic Data  Results from last 7 days   Lab Units 06/13/23  0113 06/12/23  2101   SODIUM mmol/L 138 142   POTASSIUM mmol/L 3.8 3.5   CHLORIDE mmol/L 106 110*   CO2 mmol/L 19.0* 19.0*   BUN mg/dL 6 6   CREATININE mg/dL 0.67 0.72   GLUCOSE mg/dL 99 107*   CALCIUM mg/dL 8.6 9.2     Results from last 7 days   Lab Units 06/13/23  0113 06/12/23  2101   HSTROP T ng/L 16* 11*     Results from last 7 days   Lab Units 06/13/23  0545 06/12/23  2101   WBC 10*3/mm3 9.99 9.82   HEMOGLOBIN g/dL 9.5* 10.5*   HEMATOCRIT % 32.1* 37.4   PLATELETS 10*3/mm3 239 241     Results from last 7 days   Lab Units 06/13/23  0113   MAGNESIUM mg/dL 2.1                 Results from last 7 days   Lab Units 06/12/23  2101   PROBNP pg/mL 368.4     Results from last 7 days   Lab Units 06/13/23  0544 06/12/23  2101   PROTIME Seconds  --  13.1   INR   --  0.99   APTT seconds 48.6* 24.4*       I personally reviewed the patient's EKG/Telemetry data    Radiology Data:   CTA Chest 6/13/23:  IMPRESSION:  1.Large saddle embolus extending into bilateral central pulmonary arteries and its distal branches.  2.No acute pulmonary findings.    Echo 6/13/23:    Left ventricular systolic function is normal. Left ventricular ejection fraction appears to be 56 - 60%.    Left ventricular wall thickness is consistent with mild concentric hypertrophy.    Normal right ventricular cavity size and systolic function noted.  The RV to LV ratio is 0.8.    Mild pulmonic valve regurgitation is present.    Mild tricuspid valve regurgitation is present.    Estimated right ventricular systolic pressure from tricuspid regurgitation is mildly elevated (35-45 mmHg).    Current Medications:    aspirin, 324 mg, Oral, Once  FLUoxetine, 20 mg, Oral, Daily  pantoprazole, 40 mg, Oral, Q AM  senna-docusate sodium, 2 tablet, Oral, BID  sodium chloride, 10 mL, Intravenous,  Q12H      heparin, 13 Units/kg/hr, Last Rate: 13 Units/kg/hr (06/13/23 0806)  lactated ringers, 100 mL/hr, Last Rate: 100 mL/hr (06/13/23 0805)  Pharmacy to Dose Heparin,       Assessment & Plan     Assessment:  Acute Submassive Saddle recurrent PE  CTA Chest 6/13/23: acute large saddle PE extending into bilateral central pulmonary arteries and its distal branches  Echo 6/13/23: EF 56-60%, borderline dilated RV, RV to LV ratio 0.8, mild pulmonary hypertension with an estimated RVSP of 43 mmHg    History of DVT/PE 9/2022  Status post iliofemoral venous catheter-based thrombolysis and thrombectomy  May Thurner syndrome status post stenting 9/22  Hypertension, controlled    Plan:  Discussed the clinical scenario and diagnostic findings with the patient and her  on multiple visits.  Discussed options including medical therapy only, systemic thrombolytics, catheter-based thrombolysis, and mechanical thrombectomy.  After further discussion with Dr's Case and other cardiology partners, all agree its reasonable in this young, functional patient to pursue mechanical thrombectomy in effort to prevent long-term complications of a submassive PE including pulmonary hypertension, RV dysfunction/cor pulmonale, despite her stable hemodynamics while supine/at rest and despite a lack of severe RV strain by noninvasive imaging.  With that said, the RV size is borderline, the estimated RVSP is 43 mmHg, and with ambulation the patient's heart rate went to 140 bpm and oxygen saturation went to 90%.  Patient developed symptoms with just sitting upright (asymptomatic while supine).   The patient and her  are agreeable to proceed with mechanical thrombectomy.  Continue n.p.o. status  NS infusion at 75 mL/hr   Hold heparin gtt for now, in preparation for intraprocedural heparin dosing    Scribed by Cy Reynoso PA-C for Dr. Rafiq Martinez MD on 06/13/23    I, Rafiq Martinez MD, personally performed the services as scribed by  the above named individual. I have made any necessary edits and it is both accurate and complete.     Rafiq Martinez MD, MSc, FACC, [facility]  Interventional Cardiology  [facility]

## 2023-06-14 VITALS
OXYGEN SATURATION: 96 % | HEIGHT: 67 IN | RESPIRATION RATE: 18 BRPM | HEART RATE: 91 BPM | BODY MASS INDEX: 42.21 KG/M2 | TEMPERATURE: 98.6 F | WEIGHT: 268.96 LBS | DIASTOLIC BLOOD PRESSURE: 79 MMHG | SYSTOLIC BLOOD PRESSURE: 120 MMHG

## 2023-06-14 LAB
ACT BLD: 233 SECONDS (ref 82–152)
ACT BLD: 263 SECONDS (ref 82–152)
ANION GAP SERPL CALCULATED.3IONS-SCNC: 11 MMOL/L (ref 5–15)
BUN SERPL-MCNC: 5 MG/DL (ref 6–20)
BUN/CREAT SERPL: 7.7 (ref 7–25)
CALCIUM SPEC-SCNC: 7.9 MG/DL (ref 8.6–10.5)
CHLORIDE SERPL-SCNC: 108 MMOL/L (ref 98–107)
CO2 SERPL-SCNC: 19 MMOL/L (ref 22–29)
CREAT SERPL-MCNC: 0.65 MG/DL (ref 0.57–1)
DEPRECATED RDW RBC AUTO: 46.5 FL (ref 37–54)
EGFRCR SERPLBLD CKD-EPI 2021: 120.1 ML/MIN/1.73
ERYTHROCYTE [DISTWIDTH] IN BLOOD BY AUTOMATED COUNT: 17.4 % (ref 12.3–15.4)
GLUCOSE SERPL-MCNC: 92 MG/DL (ref 65–99)
HCT VFR BLD AUTO: 30.7 % (ref 34–46.6)
HGB BLD-MCNC: 9.1 G/DL (ref 12–15.9)
MAGNESIUM SERPL-MCNC: 1.7 MG/DL (ref 1.6–2.6)
MCH RBC QN AUTO: 22 PG (ref 26.6–33)
MCHC RBC AUTO-ENTMCNC: 29.6 G/DL (ref 31.5–35.7)
MCV RBC AUTO: 74.3 FL (ref 79–97)
PHOSPHATE SERPL-MCNC: 3.9 MG/DL (ref 2.5–4.5)
PLATELET # BLD AUTO: 194 10*3/MM3 (ref 140–450)
PMV BLD AUTO: 9.7 FL (ref 6–12)
POTASSIUM SERPL-SCNC: 3.8 MMOL/L (ref 3.5–5.2)
RBC # BLD AUTO: 4.13 10*6/MM3 (ref 3.77–5.28)
SODIUM SERPL-SCNC: 138 MMOL/L (ref 136–145)
UFH PPP CHRO-ACNC: 0.15 IU/ML (ref 0.3–0.7)
WBC NRBC COR # BLD: 6.71 10*3/MM3 (ref 3.4–10.8)

## 2023-06-14 PROCEDURE — 25010000002 LORAZEPAM PER 2 MG: Performed by: INTERNAL MEDICINE

## 2023-06-14 PROCEDURE — 83735 ASSAY OF MAGNESIUM: CPT | Performed by: INTERNAL MEDICINE

## 2023-06-14 PROCEDURE — 84100 ASSAY OF PHOSPHORUS: CPT | Performed by: INTERNAL MEDICINE

## 2023-06-14 PROCEDURE — 80048 BASIC METABOLIC PNL TOTAL CA: CPT | Performed by: INTERNAL MEDICINE

## 2023-06-14 PROCEDURE — 85027 COMPLETE CBC AUTOMATED: CPT | Performed by: INTERNAL MEDICINE

## 2023-06-14 PROCEDURE — 85520 HEPARIN ASSAY: CPT

## 2023-06-14 RX ORDER — LORAZEPAM 0.5 MG/1
0.5 TABLET ORAL EVERY 6 HOURS PRN
Status: DISCONTINUED | OUTPATIENT
Start: 2023-06-14 | End: 2023-06-14 | Stop reason: HOSPADM

## 2023-06-14 RX ORDER — MAGNESIUM SULFATE HEPTAHYDRATE 40 MG/ML
4 INJECTION, SOLUTION INTRAVENOUS ONCE
Status: CANCELLED | OUTPATIENT
Start: 2023-06-14 | End: 2023-06-14

## 2023-06-14 RX ORDER — HEPARIN SODIUM 1000 [USP'U]/ML
2000 INJECTION, SOLUTION INTRAVENOUS; SUBCUTANEOUS ONCE
Status: DISCONTINUED | OUTPATIENT
Start: 2023-06-14 | End: 2023-06-14

## 2023-06-14 RX ORDER — MAGNESIUM SULFATE HEPTAHYDRATE 40 MG/ML
4 INJECTION, SOLUTION INTRAVENOUS ONCE
Status: DISCONTINUED | OUTPATIENT
Start: 2023-06-14 | End: 2023-06-14 | Stop reason: HOSPADM

## 2023-06-14 RX ADMIN — LORAZEPAM 0.5 MG: 2 INJECTION INTRAMUSCULAR; INTRAVENOUS at 06:30

## 2023-06-14 RX ADMIN — APIXABAN 10 MG: 5 TABLET, FILM COATED ORAL at 08:48

## 2023-06-14 RX ADMIN — LORAZEPAM 0.5 MG: 2 INJECTION INTRAMUSCULAR; INTRAVENOUS at 02:29

## 2023-06-14 RX ADMIN — SENNOSIDES AND DOCUSATE SODIUM 2 TABLET: 50; 8.6 TABLET ORAL at 08:02

## 2023-06-14 RX ADMIN — Medication 10 ML: at 08:02

## 2023-06-14 RX ADMIN — FLUOXETINE 20 MG: 20 CAPSULE ORAL at 08:02

## 2023-06-14 RX ADMIN — PANTOPRAZOLE SODIUM 40 MG: 40 TABLET, DELAYED RELEASE ORAL at 06:30

## 2023-06-14 RX ADMIN — LORAZEPAM 0.5 MG: 0.5 TABLET ORAL at 11:20

## 2023-06-14 NOTE — PROGRESS NOTES
Walking O2 sat on 2L was >95%.  RA walking sat was 95%.    Electronically signed by KERVIN Arce, 06/14/23, 11:04 AM EDT.

## 2023-06-14 NOTE — PROGRESS NOTES
Patient is on Apixaban.  Education provided on 06/14 verbally and in writing.  Information provided includes effects of medication, drug-drug and drug-food interactions, and signs/symptoms of bleeding and clotting.  Patient verbalized understanding through teach back.  All pertinent questions were answered.     Brain Child, Pharmacy Intern  06/14/2023

## 2023-06-14 NOTE — PLAN OF CARE
Goal Outcome Evaluation:  Plan of Care Reviewed With: patient        Progress: improving  Outcome Evaluation: Pt remains in ICU, venous sheaths removed per protocol with no complications. Pt is AAO, denies SOA, vitals stable on room air. Heparin gtt restarted per order. PRN pain meds added for back pain. Rene ROSADO

## 2023-06-14 NOTE — DISCHARGE SUMMARY
Discharge Summary    Patient name: Urszula Gonzalez  CSN: [id number]  MRN: [id number]  : 1990  Today's date: 2023     Date of Admission: 2023  Date of Discharge:  2023    Admitting Physician:  Dr. Panchal  Primary Care Provider: Cha Fernández APRN  Consultations:   Cardiology:  Dr. Martinez    Admission Diagnosis:  PE     Discharge Diagnoses:   Active Hospital Problems    Diagnosis     **Acute saddle pulmonary embolism without acute cor pulmonale     Gastroesophageal reflux disease without esophagitis      Allergies:  Ciprofloxacin    Code Status and Medical Interventions:   Ordered at: 23 8086     Level Of Support Discussed With:    Patient     Code Status (Patient has no pulse and is not breathing):    CPR (Attempt to Resuscitate)     Medical Interventions (Patient has pulse or is breathing):    Full     Procedures/Testin23 TTE - Left ventricular systolic function is normal. Left ventricular ejection fraction appears to be 56 - 60%.  Left ventricular wall thickness is consistent with mild concentric hypertrophy.  Normal right ventricular cavity size and systolic function noted.  The RV to LV ratio is 0.8.  Mild pulmonic valve regurgitation is present.  Mild tricuspid valve regurgitation is present.  Estimated right ventricular systolic pressure from tricuspid regurgitation is mildly elevated (35-45 mmHg).    23 BLE venous duplex was negative    23 Percutaneous mechanical thrombectomy which was aborted.  The previously noted saddle embolus on the patient's CT angiogram is no longer present.  There is a large thrombus noted in the right interlobar artery.  The left pulmonary arteries were not well visualized, but do not appear to have a significant thrombus.  Patent iliocaval veins bilaterally including a patent left iliac stent.  Mild pulmonary hypertension with a mildly elevated mean pulmonary artery pressure of 24 mmHg.    History of Present  Illness:  Urszula Gonzalez is a 33 y.o. 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 Davey with hematology), who presents with a 2 to 3-day history of palpitations/tachycardia and progressive exertional dyspnea and is found to have a saddle pulmonary embolism on CT angiogram of the chest without radiographic evidence of right heart strain.     Patient denies family history of thromboembolic disease.  She is active with no recent periods of inactivity.  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  Has had previous extensive hypercoagulable work-up.     Patient was tachycardic on arrival with heart rate in the low 100s.  Currently her heart rate is ranging from the 70s to 90s.  She was started on a heparin drip by the emergency department.    Hospital Course:  She had ECHO and BLE venous duplex with results above.  Cardiology was consulted and she was taken to cath lab for intervention by Dr. Martinez.  Mechanical thrombectomy was scheduled, but it was not needed due to the previously noted saddle embolus on the patient's CT angiogram is no longer present.  She had patent iliocaval veins bilaterally including a patent left iliac stent and mild pulmonary hypertension with a mildly elevated mean pulmonary artery pressure of 24 mmHg.  She was transitioned from heparin drip to Eliquis.  She had walking O2 sat on 2L was >95% and RA walking sat was 95%.  She has been cleared to go home by Cardiology.      Vitals:  /79 (BP Location: Right arm, Patient Position: Lying)   Pulse 91   Temp 98.6 °F (37 °C) (Oral)   Resp 18   Ht  "170 cm (66.93\")   Wt 122 kg (268 lb 15.4 oz)   SpO2 96%   BMI 42.21 kg/m²     Physical Exam:  Constitutional: No distress. Sitting up in chair.    HEENT:  Normocephalic and atraumatic. PERRL  Neck:  Neck supple. No JVD present.   CV: Normal rate, regular rhythm,  intact distal pulses.  No gallop, murmur or rub.  Pulmonary/Chest: Effort normal and breath sounds normal. No respiratory distress. No wheezes, rhonchi or rales.   Abdominal: Soft. +BS. No distension and no mass. There is no tenderness.   Musculoskeletal: Normal muscle tone and strength  Neurological: Alert and oriented to person, place, and time.  No focal deficits  Skin: Skin is warm and dry. No rash noted.   Extremities:  No clubbing, edema or cyanosis  Psychiatric: Normal mood and affect. Behavior is normal.     Labs:  Results from last 7 days   Lab Units 06/14/23  1051   WBC 10*3/mm3 6.71   HEMOGLOBIN g/dL 9.1*   HEMATOCRIT % 30.7*   PLATELETS 10*3/mm3 194     Results from last 7 days   Lab Units 06/14/23  0641 06/13/23  0113 06/12/23  2101   SODIUM mmol/L 138   < > 142   POTASSIUM mmol/L 3.8   < > 3.5   CHLORIDE mmol/L 108*   < > 110*   CO2 mmol/L 19.0*   < > 19.0*   BUN mg/dL 5*   < > 6   CREATININE mg/dL 0.65   < > 0.72   CALCIUM mg/dL 7.9*   < > 9.2   BILIRUBIN mg/dL  --   --  0.2   ALK PHOS U/L  --   --  70   ALT (SGPT) U/L  --   --  17   AST (SGOT) U/L  --   --  15   GLUCOSE mg/dL 92   < > 107*    < > = values in this interval not displayed.         Magnesium   Date Value Ref Range Status   06/14/2023 1.7 1.6 - 2.6 mg/dL Final   06/13/2023 2.1 1.6 - 2.6 mg/dL Final     Phosphorus   Date Value Ref Range Status   06/14/2023 3.9 2.5 - 4.5 mg/dL Final   06/13/2023 3.7 2.5 - 4.5 mg/dL Final                    Discharge Medications        Changes to Medications        Instructions Start Date   apixaban 5 MG tablet tablet  Commonly known as: ELIQUIS  What changed: how much to take   10 mg, Oral, Every 12 Hours Scheduled      apixaban 5 MG tablet " tablet  Commonly known as: ELIQUIS  What changed: You were already taking a medication with the same name, and this prescription was added. Make sure you understand how and when to take each.   5 mg, Oral, Every 12 Hours Scheduled   Start Date: June 21, 2023            Continue These Medications        Instructions Start Date   FLUoxetine 20 MG capsule  Commonly known as: PROzac   No dose, route, or frequency recorded.      HYDROcodone-acetaminophen 5-325 MG per tablet  Commonly known as: NORCO   2 tablets, Oral, Every 4 Hours PRN      LORazepam 0.5 MG tablet  Commonly known as: ATIVAN   No dose, route, or frequency recorded.      pantoprazole 40 MG EC tablet  Commonly known as: PROTONIX   40 mg, Oral, Daily      Semaglutide(0.25 or 0.5MG/DOS) 2 MG/1.5ML solution pen-injector  Commonly known as: OZEMPIC   Subcutaneous, Weekly             Stop These Medications      aspirin 81 MG EC tablet     hydrOXYzine 25 MG tablet  Commonly known as: ATARAX              Diet Instructions       Diet: Regular/House Diet, Diabetic Diets, Cardiac Diets; Healthy Heart (2-3 Na+); Regular Texture (IDDSI 7); Thin (IDDSI 0); Consistent Carbohydrate      Discharge Diet:  Regular/House Diet  Diabetic Diets  Cardiac Diets       Cardiac Diet: Healthy Heart (2-3 Na+)    Texture: Regular Texture (IDDSI 7)    Fluid Consistency: Thin (IDDSI 0)    Diabetic Diet: Consistent Carbohydrate            Activity Instructions       Activity as Tolerated              Follow-up Appointments  Future Appointments   Date Time Provider Department Center   2/19/2024 10:15 AM Rafiq Martinez MD [facility]     Additional Instructions for the Follow-ups that You Need to Schedule       Discharge Follow-up with PCP   As directed       Currently Documented PCP:    Cha Fernández APRN    PCP Phone Number:    [phone]     Follow Up Details: one week         Discharge Follow-up with Specified Provider: Dr. Davey 3-4 weeks   As directed      To:   Petar 3-4 weeks         Discharge Follow-up with Specified Provider: Dr. Martinez as scheduled 2/19/2024, call if appt needed sooner   As directed      To: Dr. Martinez as scheduled 2/19/2024, call if appt needed sooner               Discharge Instructions:  Ok to go home today  Follow up as above  Scripts sent electronically     KERVIN Narayanan, AGACNP-BC, FNP-BC  Pulmonary & Critical Care Medicine    Time: I spent  45  minutes on this discharge activity which included: face-to-face encounter with the patient, reviewing the data in the system, coordination of the care with the nursing staff as well as consultants, documentation, and entering orders.      CC: Cha Fernández APRN

## 2023-06-14 NOTE — CASE MANAGEMENT/SOCIAL WORK
Discharge Planning Assessment  [facility]     Patient Name: Urszula Gonzalez  MRN: [id number]  Today's Date: 6/14/2023    Admit Date: 6/12/2023    Plan: home with family   Discharge Needs Assessment       Row Name 06/14/23 1020       Living Environment    People in Home spouse;child(rachael), dependent    Name(s) of People in Home Gurvinder Gonzalez    Current Living Arrangements home    Potentially Unsafe Housing Conditions none    Primary Care Provided by self    Provides Primary Care For no one    Family Caregiver if Needed spouse;parent(s)    Quality of Family Relationships involved;helpful    Able to Return to Prior Arrangements yes       Resource/Environmental Concerns    Resource/Environmental Concerns none    Transportation Concerns none       Food Insecurity    Within the past 12 months, you worried that your food would run out before you got the money to buy more. Never true    Within the past 12 months, the food you bought just didn't last and you didn't have money to get more. Never true       Transition Planning    Patient/Family Anticipates Transition to home with family    Patient/Family Anticipated Services at Transition none    Transportation Anticipated car, drives self       Discharge Needs Assessment    Readmission Within the Last 30 Days no previous admission in last 30 days    Equipment Currently Used at Home none    Concerns to be Addressed discharge planning    Anticipated Changes Related to Illness none    Equipment Needed After Discharge none                   Discharge Plan       Row Name 06/14/23 1022       Plan    Plan home with family    Patient/Family in Agreement with Plan yes    Plan Comments Pt lives in [facility] with her  and children. She is independent with ADLs prior to admit and denies use of any DME. She is followed by her PCP and has drug coverage. At this time her plan for discharge is to return home. CM to follow for any discharge needs    Final Discharge  Disposition Code 01 - home or self-care                  Continued Care and Services - Admitted Since 6/12/2023    Coordination has not been started for this encounter.          Demographic Summary       Row Name 06/14/23 1019       General Information    Admission Type inpatient    Arrived From home    Referral Source physician    Reason for Consult discharge planning    Preferred Language English    General Information Comments PCP Cha Fernández       Contact Information    Permission Granted to Share Info With family/designee    Contact Information Comments Gurvinder Gonzalez [phone]                   Functional Status       Row Name 06/14/23 1020       Functional Status    Usual Activity Tolerance good    Current Activity Tolerance good       Physical Activity    On average, how many days per week do you engage in moderate to strenuous exercise (like a brisk walk)? Patient refu    On average, how many minutes do you engage in exercise at this level? Patient refu       Functional Status, IADL    Medications independent    Meal Preparation independent    Housekeeping independent    Laundry independent    Shopping independent       Mental Status    General Appearance WDL WDL       Mental Status Summary    Recent Changes in Mental Status/Cognitive Functioning no changes       Employment/    Current or Previous Occupation not applicable                   Psychosocial    No documentation.                  Abuse/Neglect    No documentation.                  Legal    No documentation.                  Substance Abuse    No documentation.                  Patient Forms    No documentation.                     Vicky Ivory RN

## 2023-06-14 NOTE — PROGRESS NOTES
[facility] Cardiology    Inpatient Progress Note      Chief Complaint/Reason for service:    Submassive PE         Subjective:       No acute events overnight.  Denies shortness of air at rest.  Denies lower extremity swelling.  Denies venous access site issues    Past medical, surgical, social and family history reviewed in the patient's electronic medical record.    Problem List  Active Hospital Problems    Diagnosis  POA    **Acute saddle pulmonary embolism without acute cor pulmonale [I26.92]  Yes    Gastroesophageal reflux disease without esophagitis [K21.9]  Yes      Resolved Hospital Problems   No resolved problems to display.            Objective:      Infusions:  heparin, 16 Units/kg/hr, Last Rate: 13 Units/kg/hr (06/13/23 2331)  Pharmacy to Dose Heparin,   sodium chloride, 75 mL/hr, Last Rate: 75 mL/hr (06/13/23 1952)         Medications:    Current Facility-Administered Medications:     acetaminophen (TYLENOL) tablet 650 mg, 650 mg, Oral, Q4H PRN **OR** acetaminophen (TYLENOL) suppository 650 mg, 650 mg, Rectal, Q4H PRN, Rafiq Martinez MD    atropine sulfate injection 0.5 mg, 0.5 mg, Intravenous, Q5 Min PRN, Rafiq Martinez MD    sennosides-docusate (PERICOLACE) 8.6-50 MG per tablet 2 tablet, 2 tablet, Oral, BID, 2 tablet at 06/14/23 0802 **AND** polyethylene glycol (MIRALAX) packet 17 g, 17 g, Oral, Daily PRN **AND** bisacodyl (DULCOLAX) EC tablet 5 mg, 5 mg, Oral, Daily PRN **AND** bisacodyl (DULCOLAX) suppository 10 mg, 10 mg, Rectal, Daily PRN, Rafiq Martinez MD    Calcium Replacement - Follow Nurse / BPA Driven Protocol, , Does not apply, PRN, Rafiq Martinez MD    FLUoxetine (PROzac) capsule 20 mg, 20 mg, Oral, Daily, Rafiq Martinez MD, 20 mg at 06/14/23 0802    heparin (porcine) injection 2,000 Units, 2,000 Units, Intravenous, Once, Ho Pineda RPH    heparin 75205 units/250 mL (100 units/mL) in 0.45 % NaCl infusion, 16 Units/kg/hr, Intravenous, Titrated, Ho Pineda,  RPH, Last Rate: 15.34 mL/hr at 06/13/23 2331, 13 Units/kg/hr at 06/13/23 2331    LORazepam (ATIVAN) injection 0.5 mg, 0.5 mg, Intravenous, Q4H PRN, Rafiq Martinez MD, 0.5 mg at 06/14/23 0630    Magnesium Cardiology Dose Replacement - Follow Nurse / BPA Driven Protocol, , Does not apply, Juan HAYES Michael, MD    nitroglycerin (NITROSTAT) SL tablet 0.4 mg, 0.4 mg, Sublingual, Q5 Min PRN, Rafiq Martinez MD    ondansetron (ZOFRAN) injection 4 mg, 4 mg, Intravenous, Q6H PRN, Rafiq Martinez MD    pantoprazole (PROTONIX) EC tablet 40 mg, 40 mg, Oral, Q AM, Rafiq Martinez MD, 40 mg at 06/14/23 0630    Pharmacy to Dose Heparin, , Does not apply, Continuous PRJuan HAMLIN Michael, MD    Phosphorus Replacement - Follow Nurse / BPA Driven Protocol, , Does not apply, PRJuan HAMLIN Michael, MD    Potassium Replacement - Follow Nurse / BPA Driven Protocol, , Does not apply, PRNJuan Michael, MD    sodium chloride 0.9 % flush 10 mL, 10 mL, Intravenous, Q12H, Rafiq Martinez MD, 10 mL at 06/14/23 0802    sodium chloride 0.9 % flush 10 mL, 10 mL, Intravenous, PRNJuan Michael, MD    sodium chloride 0.9 % infusion 250 mL, 250 mL, Intravenous, Once PRN, Rafiq Martinez MD    sodium chloride 0.9 % infusion 40 mL, 40 mL, Intravenous, PRN, Rafiq Martinez MD    sodium chloride 0.9 % infusion, 75 mL/hr, Intravenous, Continuous, Rafiq Martinez MD, Last Rate: 75 mL/hr at 06/13/23 1952, 75 mL/hr at 06/13/23 1952    traMADol (ULTRAM) tablet 50 mg, 50 mg, Oral, Q6H PRN, Casey Rogers APRN, 50 mg at 06/13/23 2030    Vital Sign Min/Max for last 24 hours  Temp  Min: 98 °F (36.7 °C)  Max: 98.8 °F (37.1 °C)   BP  Min: 102/92  Max: 146/82   Pulse  Min: 47  Max: 100   Resp  Min: 12  Max: 20   SpO2  Min: 92 %  Max: 100 %   No data recorded      Intake/Output Summary (Last 24 hours) at 6/14/2023 0818  Last data filed at 6/14/2023 0756  Gross per 24 hour   Intake 1413 ml   Output 1000 ml   Net 413 ml           CONSTITUTIONAL: No acute  distress  CARDIOVASCULAR: Venous access sites CDI bilaterally, nontender, no hematoma    Labs/studies:  Available lab and imaging results were reviewed by myself today    Results for orders placed during the hospital encounter of 06/12/23    Adult Transthoracic Echo Complete W/ Cont if Necessary Per Protocol    Interpretation Summary    Left ventricular systolic function is normal. Left ventricular ejection fraction appears to be 56 - 60%.    Left ventricular wall thickness is consistent with mild concentric hypertrophy.    Normal right ventricular cavity size and systolic function noted.  The RV to LV ratio is 0.8.    Mild pulmonic valve regurgitation is present.    Mild tricuspid valve regurgitation is present.    Estimated right ventricular systolic pressure from tricuspid regurgitation is mildly elevated (35-45 mmHg).      Tele: Sinus rhythm    RHC, pulmonary angio:   The previously noted saddle embolus on the patient's CT angiogram is no longer present.  There is a large thrombus noted in the right interlobar artery.  The left pulmonary arteries were not well visualized, but do not appear to have a significant thrombus.    Patent iliocaval veins bilaterally including a patent left iliac stent.    Mild pulmonary hypertension with a mildly elevated mean pulmonary artery pressure of 24 mmHg.            Assessment/Plan:       ASSESSMENT:  Acute Submassive Saddle recurrent PE  CTA Chest 6/13/23: acute large saddle PE extending into bilateral central pulmonary arteries and its distal branches  Echo 6/13/23: EF 56-60%, borderline dilated RV, RV to LV ratio 0.8, mild pulmonary hypertension with an estimated RVSP of 43 mmHg  RHC and pulmonary angiogram 6/2023 revealing that the saddle embolus had distal lysed, primarily to the right interlobar artery.  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  Additionally, iliocaval venography was performed and confirmed the absence of additional thrombus over and above the negative lower extremity venous duplex performed yesterday  Okay to transition heparin to apixaban  Recommend repeat ambulation, but this time with nasal cannula O2 for support if needed    Okay for discharge from a cardiac standpoint  Recommend follow-up with Dr. Davey, hematology, again to discuss risk versus benefits of lifelong anticoagulation in the setting of her recurrent DVT/PE  Keep follow-up with Dr. Martinez as currently scheduled in February 2024, patient to call if an appointment is needed sooner      Rafiq Martinez MD, MSc, FACC, [facility]  Interventional Cardiology  [facility]

## 2023-06-15 ENCOUNTER — TELEPHONE (OUTPATIENT)
Dept: ONCOLOGY | Facility: CLINIC | Age: 33
End: 2023-06-15
Payer: COMMERCIAL

## 2023-06-15 LAB
QT INTERVAL: 344 MS
QTC INTERVAL: 459 MS

## 2023-06-15 NOTE — TELEPHONE ENCOUNTER
Caller: Urszula Gonzalez    Relationship to patient: Self    Best call back number: [phone]    Chief complaint: SCHEDULING     Type of visit: HOSPITAL F/U    Requested date: TO BE SCHEDULED WITH DR KNIGHT IN 3 TO 4 WEEKS    If rescheduling, when is the original appointment: N/A    Additional notes:

## 2023-06-16 NOTE — PAYOR COMM NOTE
"Auth# [id number]  Discharge Summary    Utilization Review  Phone [phone]  Fax [phone]    [place] [id number]        Urszula Blackburn (33 y.o. Female)     Date of Birth   1990    Social Security Number       Address   [address] [place] [id number]    Home Phone   [phone]    MRN   [id number]       Church   [employer]    Marital Status                               Admission Date   6/12/23    Admission Type   Emergency    Admitting Provider   Johnny Panchal MD    Attending Provider       Department, Room/Bed   [facility] 2B ICU, N231/1       Discharge Date   6/14/2023    Discharge Disposition   Home or Self Care    Discharge Destination                               Attending Provider: (none)   Allergies: Ciprofloxacin    Isolation: None   Infection: None   Code Status: Prior    Ht: 170 cm (66.93\")   Wt: 122 kg (268 lb 15.4 oz)    Admission Cmt: None   Principal Problem: Acute saddle pulmonary embolism without acute cor pulmonale [I26.92]                 Active Insurance as of 6/12/2023     Primary Coverage     Payor Plan Insurance Group Employer/Plan Group    [employer] [place] [employer] [place]ICA[place]      Payor Plan Address Payor Plan Phone Number Payor Plan Fax Number Effective Dates    [address] [address] [phone]  9/25/2020 - None Entered    [facility] [id number]       Subscriber Name Subscriber Birth Date Member ID       URSZULA BLACKBURN 1990 [id number]                 Emergency Contacts      (Rel.) Home Phone Work Phone Mobile Phone    MICHAELA MUNOZ (Mother) [phone] -- [phone]    BERETONG (Spouse) [phone] -- [phone]               Discharge Summary      Juliet Valdez APRN at 06/14/23 1200     Attestation signed by May Ervin DO at 06/14/23 0373    I have reviewed this documentation and agree.                  Discharge " Summary    Patient name: Urszula Gonzalez  CSN: [id number]  MRN: [id number]  : 1990  Today's date: 2023     Date of Admission: 2023  Date of Discharge:  2023    Admitting Physician:  Dr. Panchal  Primary Care Provider: Cha Fernández APRN  Consultations:   Cardiology:  Dr. Martinez    Admission Diagnosis:  PE     Discharge Diagnoses:   Active Hospital Problems    Diagnosis    • **Acute saddle pulmonary embolism without acute cor pulmonale    • Gastroesophageal reflux disease without esophagitis      Allergies:  Ciprofloxacin    Code Status and Medical Interventions:   Ordered at: 23 6717     Level Of Support Discussed With:    Patient     Code Status (Patient has no pulse and is not breathing):    CPR (Attempt to Resuscitate)     Medical Interventions (Patient has pulse or is breathing):    Full     Procedures/Testin23 TTE - Left ventricular systolic function is normal. Left ventricular ejection fraction appears to be 56 - 60%.  Left ventricular wall thickness is consistent with mild concentric hypertrophy.  Normal right ventricular cavity size and systolic function noted.  The RV to LV ratio is 0.8.  Mild pulmonic valve regurgitation is present.  Mild tricuspid valve regurgitation is present.  Estimated right ventricular systolic pressure from tricuspid regurgitation is mildly elevated (35-45 mmHg).    23 BLE venous duplex was negative    23 Percutaneous mechanical thrombectomy which was aborted.  The previously noted saddle embolus on the patient's CT angiogram is no longer present.  There is a large thrombus noted in the right interlobar artery.  The left pulmonary arteries were not well visualized, but do not appear to have a significant thrombus.  Patent iliocaval veins bilaterally including a patent left iliac stent.  Mild pulmonary hypertension with a mildly elevated mean pulmonary artery pressure of 24 mmHg.    History of Present Illness:  Urszula  "Eulalia Gonzalez is a 33 y.o. 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 Davey with hematology), who presents with a 2 to 3-day history of palpitations/tachycardia and progressive exertional dyspnea and is found to have a saddle pulmonary embolism on CT angiogram of the chest without radiographic evidence of right heart strain.     Patient denies family history of thromboembolic disease.  She is active with no recent periods of inactivity.  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  Has had previous extensive hypercoagulable work-up.     Patient was tachycardic on arrival with heart rate in the low 100s.  Currently her heart rate is ranging from the 70s to 90s.  She was started on a heparin drip by the emergency department.    Hospital Course:  She had ECHO and BLE venous duplex with results above.  Cardiology was consulted and she was taken to cath lab for intervention by Dr. Martinez.  Mechanical thrombectomy was scheduled, but it was not needed due to the previously noted saddle embolus on the patient's CT angiogram is no longer present.  She had patent iliocaval veins bilaterally including a patent left iliac stent and mild pulmonary hypertension with a mildly elevated mean pulmonary artery pressure of 24 mmHg.  She was transitioned from heparin drip to Eliquis.  She had walking O2 sat on 2L was >95% and RA walking sat was 95%.  She has been cleared to go home by Cardiology.      Vitals:  /79 (BP Location: Right arm, Patient Position: Lying)   Pulse 91   Temp 98.6 °F (37 °C) (Oral)   Resp 18   Ht 170 cm (66.93\") "   Wt 122 kg (268 lb 15.4 oz)   SpO2 96%   BMI 42.21 kg/m²     Physical Exam:  Constitutional: No distress. Sitting up in chair.    HEENT:  Normocephalic and atraumatic. PERRL  Neck:  Neck supple. No JVD present.   CV: Normal rate, regular rhythm,  intact distal pulses.  No gallop, murmur or rub.  Pulmonary/Chest: Effort normal and breath sounds normal. No respiratory distress. No wheezes, rhonchi or rales.   Abdominal: Soft. +BS. No distension and no mass. There is no tenderness.   Musculoskeletal: Normal muscle tone and strength  Neurological: Alert and oriented to person, place, and time.  No focal deficits  Skin: Skin is warm and dry. No rash noted.   Extremities:  No clubbing, edema or cyanosis  Psychiatric: Normal mood and affect. Behavior is normal.     Labs:  Results from last 7 days   Lab Units 06/14/23  1051   WBC 10*3/mm3 6.71   HEMOGLOBIN g/dL 9.1*   HEMATOCRIT % 30.7*   PLATELETS 10*3/mm3 194     Results from last 7 days   Lab Units 06/14/23  0641 06/13/23  0113 06/12/23  2101   SODIUM mmol/L 138   < > 142   POTASSIUM mmol/L 3.8   < > 3.5   CHLORIDE mmol/L 108*   < > 110*   CO2 mmol/L 19.0*   < > 19.0*   BUN mg/dL 5*   < > 6   CREATININE mg/dL 0.65   < > 0.72   CALCIUM mg/dL 7.9*   < > 9.2   BILIRUBIN mg/dL  --   --  0.2   ALK PHOS U/L  --   --  70   ALT (SGPT) U/L  --   --  17   AST (SGOT) U/L  --   --  15   GLUCOSE mg/dL 92   < > 107*    < > = values in this interval not displayed.         Magnesium   Date Value Ref Range Status   06/14/2023 1.7 1.6 - 2.6 mg/dL Final   06/13/2023 2.1 1.6 - 2.6 mg/dL Final     Phosphorus   Date Value Ref Range Status   06/14/2023 3.9 2.5 - 4.5 mg/dL Final   06/13/2023 3.7 2.5 - 4.5 mg/dL Final                    Discharge Medications        Changes to Medications        Instructions Start Date   apixaban 5 MG tablet tablet  Commonly known as: ELIQUIS  What changed: how much to take   10 mg, Oral, Every 12 Hours Scheduled      apixaban 5 MG tablet tablet  Commonly  known as: BERNARDA  What changed: You were already taking a medication with the same name, and this prescription was added. Make sure you understand how and when to take each.   5 mg, Oral, Every 12 Hours Scheduled   Start Date: June 21, 2023            Continue These Medications        Instructions Start Date   FLUoxetine 20 MG capsule  Commonly known as: PROzac   No dose, route, or frequency recorded.      HYDROcodone-acetaminophen 5-325 MG per tablet  Commonly known as: NORCO   2 tablets, Oral, Every 4 Hours PRN      LORazepam 0.5 MG tablet  Commonly known as: ATIVAN   No dose, route, or frequency recorded.      pantoprazole 40 MG EC tablet  Commonly known as: PROTONIX   40 mg, Oral, Daily      Semaglutide(0.25 or 0.5MG/DOS) 2 MG/1.5ML solution pen-injector  Commonly known as: OZEMPIC   Subcutaneous, Weekly             Stop These Medications      aspirin 81 MG EC tablet     hydrOXYzine 25 MG tablet  Commonly known as: ATARAX              Diet Instructions       Diet: Regular/House Diet, Diabetic Diets, Cardiac Diets; Healthy Heart (2-3 Na+); Regular Texture (IDDSI 7); Thin (IDDSI 0); Consistent Carbohydrate      Discharge Diet:  Regular/House Diet  Diabetic Diets  Cardiac Diets       Cardiac Diet: Healthy Heart (2-3 Na+)    Texture: Regular Texture (IDDSI 7)    Fluid Consistency: Thin (IDDSI 0)    Diabetic Diet: Consistent Carbohydrate            Activity Instructions       Activity as Tolerated              Follow-up Appointments  Future Appointments   Date Time Provider Department Center   2/19/2024 10:15 AM Rafiq Martinez MD [facility]     Additional Instructions for the Follow-ups that You Need to Schedule       Discharge Follow-up with PCP   As directed       Currently Documented PCP:    Cha Fernández APRN    PCP Phone Number:    [phone]     Follow Up Details: one week         Discharge Follow-up with Specified Provider: Dr. Davey 3-4 weeks   As directed      To: Dr. Davey 3-4 weeks          Discharge Follow-up with Specified Provider: Dr. Martinez as scheduled 2/19/2024, call if appt needed sooner   As directed      To: Dr. Martinez as scheduled 2/19/2024, call if appt needed sooner               Discharge Instructions:  Ok to go home today  Follow up as above  Scripts sent electronically     KERVIN Narayanan, AGACNP-BC, FNP-BC  Pulmonary & Critical Care Medicine    Time: I spent  45  minutes on this discharge activity which included: face-to-face encounter with the patient, reviewing the data in the system, coordination of the care with the nursing staff as well as consultants, documentation, and entering orders.      CC: Cha Fernández APRN        Electronically signed by May Ervin,  at 06/14/23 1453       Discharge Order (From admission, onward)     Start     Ordered    06/14/23 1124  Discharge patient  Once        Expected Discharge Date: 06/14/23    Discharge Disposition: Home or Self Care    Physician of Record for Attribution - Please select from Treatment Team: MAY ERVIN [[id number]]    Review needed by CMO to determine Physician of Record: No       Question Answer Comment   Physician of Record for Attribution - Please select from Treatment Team MAY ERVIN    Review needed by CMO to determine Physician of Record No        06/14/23 1138

## 2023-08-21 ENCOUNTER — OFFICE VISIT (OUTPATIENT)
Dept: ONCOLOGY | Facility: CLINIC | Age: 33
End: 2023-08-21
Payer: COMMERCIAL

## 2023-08-21 VITALS
RESPIRATION RATE: 18 BRPM | HEIGHT: 67 IN | TEMPERATURE: 97.5 F | OXYGEN SATURATION: 99 % | HEART RATE: 69 BPM | BODY MASS INDEX: 42.21 KG/M2 | SYSTOLIC BLOOD PRESSURE: 131 MMHG | DIASTOLIC BLOOD PRESSURE: 88 MMHG

## 2023-08-21 DIAGNOSIS — I82.90 DEEP VEIN THROMBOSIS (DVT) ASSOCIATED WITH COVID-19: ICD-10-CM

## 2023-08-21 DIAGNOSIS — I26.99 OTHER ACUTE PULMONARY EMBOLISM WITHOUT ACUTE COR PULMONALE: Primary | ICD-10-CM

## 2023-08-21 DIAGNOSIS — U07.1 DEEP VEIN THROMBOSIS (DVT) ASSOCIATED WITH COVID-19: ICD-10-CM

## 2023-08-21 PROCEDURE — 1126F AMNT PAIN NOTED NONE PRSNT: CPT | Performed by: INTERNAL MEDICINE

## 2023-08-21 PROCEDURE — 99214 OFFICE O/P EST MOD 30 MIN: CPT | Performed by: INTERNAL MEDICINE

## 2023-08-21 NOTE — PROGRESS NOTES
Follow Up Office Visit      Date: 2023     Patient Name: Urszula Gonzalez  MRN: [id number]  : 1990  Referring Physician: Hospital follow-up     Chief Complaint: Follow-up for COVID induced DVT/PE     History of Present Illness: Ms. Gonzalez is a 32-year-old lady with a past medical history of anxiety, GERD who presents to [facility] for extensive left lower extremity DVT and PE related to COVID-19.  Patient states that she was diagnosed with COVID-19 on 2022.  Notes that about 11 days later she started to notice left lower extremity cramping that did not significantly improve.  She was seen in the ED at [facility] and subsequently had a diagnosis of a left lower extremity DVT.  She was given tPA and admitted overnight.  She was discharged the next day with Eliquis.  She took 1 dose of the Eliquis and continued to have significant discomfort in her left lower extremity and tightness in her chest.  She was then readmitted with continued clot in her left lower extremity and significant pulmonary embolus with right heart strain.  She was then transferred to [facility] where she was seen by Dr. Zhao who performed direct thrombotic infusion as well as thrombectomy.  No intervention needed to her pulmonary embolus as she is hemodynamically stable and ambulating without difficulty.  She has never had a DVT or PE in the past.  Notes a grandmother with a history of blood clots.  Has had 3 children with no significant complications regarding miscarriages or DVT during pregnancy     Interval History:  Presents clinic for follow-up.  Per our discussion, she discontinued apixaban in 2023.  She represented in 2023 with shortness of breath and chest discomfort.  She was noted to have a saddle pulmonary embolus on CT scan.  She was initially going to undergo a mechanical thrombectomy however during the cath clot had moved and she did not have a saddle PE  at the time.  She is currently on apixaban and tolerating this well.  Notes that her shortness of breath and chest pain have resolved.  Denies any lower extremity pain or discomfort today    Oncology History:    Oncology/Hematology History    No history exists.       Subjective      Review of Systems:   Constitutional: Negative for fevers, chills, or weight loss  Eyes: Negative for blurred vision or discharge         Ear/Nose/Throat: Negative for difficulty swallowing, sore throat, LAD                                                       Respiratory: Negative for cough, SOA, wheezing                                                                                        Cardiovascular: Negative for chest pain or palpitations                                                                  Gastrointestinal: Negative for nausea, vomiting or diarrhea                                                                     Genitourinary: Negative for dysuria or hematuria                                                                                           Musculoskeletal: Negative for any joint pains or muscle aches                                                                        Neurologic: Negative for any weakness, headaches, dizziness                                                                         Hematologic: Negative for any easy bleeding or bruising                                                                                   Psychiatric: Negative for anxiety or depression                          Past Medical History/Past Surgical History/ Family History/ Social History: Reviewed by me and unchanged from my previous documentation done on March 2023.     Medications:     Current Outpatient Medications:     apixaban (ELIQUIS) 5 MG tablet tablet, Take 1 tablet by mouth Every 12 (Twelve) Hours. Indications: DVT/PE (active thrombosis), Disp: 60 tablet, Rfl: 2    FLUoxetine (PROzac) 20 MG capsule, ,  "Disp: , Rfl:     HYDROcodone-acetaminophen (NORCO) 5-325 MG per tablet, Take 2 tablets by mouth Every 4 (Four) Hours As Needed for Moderate Pain. (Patient not taking: Reported on 8/21/2023), Disp: 15 tablet, Rfl: 0    LORazepam (ATIVAN) 0.5 MG tablet, , Disp: , Rfl:     pantoprazole (PROTONIX) 40 MG EC tablet, Take 1 tablet by mouth Daily., Disp: , Rfl:     Semaglutide,0.25 or 0.5MG/DOS, (OZEMPIC) 2 MG/1.5ML solution pen-injector, Inject  under the skin into the appropriate area as directed 1 (One) Time Per Week., Disp: , Rfl:     Allergies:   Allergies   Allergen Reactions    Ciprofloxacin Hives       Objective     Physical Exam:  Vital Signs:   Vitals:    08/21/23 1525   BP: 131/88   Pulse: 69   Resp: 18   Temp: 97.5 øF (36.4 øC)   TempSrc: Infrared   SpO2: 99%   Weight: Comment: pt refused   Height: 170 cm (66.93\")   PainSc: 0-No pain     Pain Score    08/21/23 1525   PainSc: 0-No pain     ECOG Performance Status: 0 - Asymptomatic    Constitutional: NAD, ECOG 0  Eyes: PERRLA, scleral anicteric  ENT: No LAD, no thyromegaly  Respiratory: CTAB, no wheezing, rales, rhonchi  Cardiovascular: RRR, no murmurs, pulses 2+ bilaterally  Abdomen: soft, NT/ND, no HSM  Musculoskeletal: strength 5/5 bilaterally, no c/c/e  Neurologic: A&O x 3, CN II-XII intact grossly    Results Review:   No visits with results within 2 Week(s) from this visit.   Latest known visit with results is:   No results displayed because visit has over 200 results.          No results found.    Assessment / Plan      Assessment/Plan:   1. Deep vein thrombosis (DVT) associated with COVID-19 (Primary)/2. Other acute pulmonary embolism without acute cor pulmonale (HCC)  -Patient with extensive thrombosis in her left lower extremity and pulmonary embolism secondary to COVID-19  -Status post direct catheter therapy and thrombectomy in her left lower extremity thrombosis  -No indication for thrombectomy or direct lateral therapy in her pulmonary embolus as she " was hemodynamically stable in the hospital  -Hypercoagulable work-up negative  -Left lower extremity duplex showing clot resolution  -CT angiogram December 2022 showing only a small clot burden which is significantly decreased from her initial presentation  -CT angiogram in March 2023 showing only some mild sequela of chronic PE and scarring.  No acute PE identified  -Initially discontinued apixaban in March 2023  -Represented with a saddle pulmonary embolus in June 2023  -Back on apixaban and tolerating well and remains asymptomatic at this time  -Given her second clotting event, recommend indefinite anticoagulation which the patient and is agreeable to  -No further hematologic work-up required at this time given her previous negative hypercoagulable work-up  -Patient's PCP to take over her apixaban refills         Follow Up:   Follow-up as needed       Raymond Davey MD  Hematology and Oncology     Please note that portions of this note may have been completed with a voice recognition program. Efforts were made to edit the dictations, but occasionally words are mistranscribed.

## 2023-10-13 ENCOUNTER — TELEPHONE (OUTPATIENT)
Dept: CARDIAC SURGERY | Facility: CLINIC | Age: 33
End: 2023-10-13
Payer: COMMERCIAL

## 2023-10-13 NOTE — TELEPHONE ENCOUNTER
Left message for patient for patient to call back regarding MRI compatibility.  Need to tell her MRI should have this information and know, if need be, we can send them OP notes and they can check compatibility.

## 2023-10-13 NOTE — TELEPHONE ENCOUNTER
called, patient is scheduled for MRI of the back on Monday 10/16/23 and are asking based on your 3 procedures including the left iliac vein stent, do you know if everything is MRI compatible or does she need to contact the company?

## 2023-10-26 ENCOUNTER — TELEPHONE (OUTPATIENT)
Dept: ONCOLOGY | Facility: CLINIC | Age: 33
End: 2023-10-26
Payer: COMMERCIAL

## 2023-10-26 NOTE — TELEPHONE ENCOUNTER
Return call to Gurvinder.  Advised Gurvinder that Urszula can use the Voltaren gel on her back for pain.  Gurvinder states understood

## 2023-10-26 NOTE — TELEPHONE ENCOUNTER
Patient's  called he wants to know if patient can use Voltaren gel for her back pain? She is blood thinners, and can't take ibuprofen, and tylenol doesn't help. Please call.

## 2025-05-01 RX ORDER — PANTOPRAZOLE SODIUM 40 MG/1
40 TABLET, DELAYED RELEASE ORAL DAILY
OUTPATIENT
Start: 2025-05-01

## 2025-05-01 NOTE — TELEPHONE ENCOUNTER
Caller: TONG BLACKBURN    Relationship: Emergency Contact    Best call back number: [phone]    Requested Prescriptions:   Requested Prescriptions     Pending Prescriptions Disp Refills    apixaban (ELIQUIS) 5 MG tablet tablet 60 tablet 2     Sig: Take 1 tablet by mouth Every 12 (Twelve) Hours. Indications: DVT/PE (active thrombosis)    pantoprazole (PROTONIX) 40 MG EC tablet       Sig: Take 1 tablet by mouth Daily.        Pharmacy where request should be sent:  [facility]    Last office visit with prescribing clinician: 8/21/2023   Last telemedicine visit with prescribing clinician: Visit date not found   Next office visit with prescribing clinician: Visit date not found     Additional details provided by patient: PCP WILL NOT REFILL THESE MEDICATIONS     Does the patient have less than a 3 day supply:  [x] Yes  [] No    Would you like a call back once the refill request has been completed: [] Yes [] No    If the office needs to give you a call back, can they leave a voicemail: [] Yes [] No    Jim Lee Rep   05/01/25 13:54 EDT

## 2025-07-02 NOTE — Clinical Note
Prepped: groin. Prepped with: ChloraPrep. The site was clipped. The patient was draped in a sterile fashion. Proceed with repeat blood work in December.   Continue to work on diet and weight loss goals.   Continue to drink at least 32-64 oz daily.   Discussed recommendations in regards to fatty liver including:   Strict control of contributing comorbidities (obesity, prediabetes/diabetes, hypertension, and hypertriglyceridemia).  Weight loss of approx 10-15% of patient's current body weight over a period of 6-12 months through low fat diet and cardiovascular exercise as tolerated.   Limiting alcohol consumption, preferably complete abstinence.  Monitor hepatic function every 6 months with routine labs.   Continue to watch for red flag symptoms.   Schedule a f/u OV in 1 year or sooner as needed.     We did review GI red flag symptoms, including, but not limited to: chronic nausea, vomiting, diarrhea, chills, fever, and unintentional weight loss and should call or contact our office with any changes or concerns. I reviewed with the patient that if they notice any blood while vomiting or in their stool they should contact or office or go to the nearest emergency room for immediate evaluation. The patient was agreeable and verbalized an understanding.

## (undated) DEVICE — SI AVANTI+ 8F STD W/GW  NO OBT: Brand: AVANTI

## (undated) DEVICE — PINNACLE R/O II INTRODUCER SHEATH WITH RADIOPAQUE MARKER: Brand: PINNACLE

## (undated) DEVICE — GLV SURG PREMIERPRO MIC LTX PF SZ7.5 BRN

## (undated) DEVICE — KT INTRO MINISTICK MAX W/GW NITNL/TUNG ECHO STFF 4F 21G 7CM

## (undated) DEVICE — RADIFOCUS GLIDEWIRE ADVANTAGE GUIDEWIRE: Brand: GLIDEWIRE ADVANTAGE

## (undated) DEVICE — CVR PROB ULTRASND/TRANSD W/GEL 7X11IN STRL

## (undated) DEVICE — DRAPE,UTILTY,TAPE,15X26, 4EA/PK: Brand: MEDLINE

## (undated) DEVICE — MODEL BT2000 P/N 700287-012KIT CONTENTS: MANIFOLD WITH SALINE AND CONTRAST PORTS, SALINE TUBING WITH SPIKE AND HAND SYRINGE, TRANSDUCER: Brand: BT2000 AUTOMATED MANIFOLD KIT

## (undated) DEVICE — Device

## (undated) DEVICE — GLV SURG BIOGEL LTX PF 7 1/2

## (undated) DEVICE — TBG PENCL TELESCP MEGADYNE SMOKE EVAC 10FT

## (undated) DEVICE — SUT GUT PLN 0 STD TIE 54IN S104H

## (undated) DEVICE — FLOWTRIEVER CATHETER, 25 MM: Brand: FLOWTRIEVER CATHETER, XL

## (undated) DEVICE — SYS SKIN CLS DERMABOND PRINEO W/22CM MESH TP

## (undated) DEVICE — STPLR SKIN SUBCUTICULAR INSORB 2030

## (undated) DEVICE — PK VASC 10

## (undated) DEVICE — SUT PROLN 6/0 C1 D/A 30IN 8706H

## (undated) DEVICE — SUCTION CANISTER 2500CC: Brand: DEROYAL

## (undated) DEVICE — ENDOPATH ELECTROSURGERY PROBE PLUS II PISTOL HAND CONTROL PISTOL GRIP HANDLES WITH SUCTION AND IRRRIGATION FOR HAND CONTROL MONOPOLAR ELCTROSURGERY USE ONLY WITH PROBE PLUS II SHAFTS: Brand: ENDOPATH

## (undated) DEVICE — PK C/SECT 10

## (undated) DEVICE — STRIP CLS WND SUTURESTRIP/PLS 0.5X5IN TP1105

## (undated) DEVICE — NDL PERC 1PRT THNWALL W/BASEPLT 18G 7CM

## (undated) DEVICE — ENDOPATH XCEL BLADELESS TROCARS WITH STABILITY SLEEVES: Brand: ENDOPATH XCEL

## (undated) DEVICE — ENDOCUT SCISSOR TIP, DISPOSABLE: Brand: RENEW

## (undated) DEVICE — GW PERIPH VASC ADX J/TP SS .035 150CM 3MM

## (undated) DEVICE — ANGIOGRAPHIC CATHETER: Brand: EXPO™

## (undated) DEVICE — PENCL ROCKRSWCH MEGADYNE W/HOLSTR 10FT SS

## (undated) DEVICE — GLV SURG GRN DERMASSURE LF PF 7.5

## (undated) DEVICE — APPL CHLORAPREP HI/LITE 26ML ORNG

## (undated) DEVICE — ANTIBACTERIAL UNDYED BRAIDED (POLYGLACTIN 910), SYNTHETIC ABSORBABLE SUTURE: Brand: COATED VICRYL

## (undated) DEVICE — GW AMPLATZER SUPERSTIFF.035 3MM J 260CM

## (undated) DEVICE — CYSTO/BLADDER IRRIGATION SET, REGULATING CLAMP

## (undated) DEVICE — CATH SZ ACCUVU SEG/20CM PIG .038IN 5F 70CM

## (undated) DEVICE — SUT GUT CHRM 1 CTX 36IN 905H

## (undated) DEVICE — CATH SZ ACCUVU SEG/20CM PIG 5F 100CM

## (undated) DEVICE — TRY SPINE BLCK WHITACRE 25G 3X5IN

## (undated) DEVICE — DECANTER BAG 9": Brand: MEDLINE INDUSTRIES, INC.

## (undated) DEVICE — INTERDRY. MOISTURE WICKING FABRIC WITH ANTIMICROBIAL SILVER. 144 IN BY 10 IN: Brand: INTERDRY

## (undated) DEVICE — SWAN-GANZ THERMODILUTION CATHETER: Brand: SWAN-GANZ

## (undated) DEVICE — ULTRAVERSE 035 PTA DILATATION CATHETER 12.0MM X 80MM BALLOON, 130CM SHAFT: Brand: ULTRAVERSE® 035 PTA DILATATION CATHETER

## (undated) DEVICE — TRAP FLD MINIVAC MEGADYNE 100ML

## (undated) DEVICE — PINNACLE INTRODUCER SHEATH: Brand: PINNACLE

## (undated) DEVICE — ST ACC MICROPUNCTURE .018 TRANSLSS/SS/TP 5F/10CM 21G/7CM

## (undated) DEVICE — ENDOPATH ELECTROSURGERY PROBE PLUS II 5MM RIGHT ANGLE MONOPOLAR ELECTROSURGERY SUCTION AND IRRRIGATION SHAFTS WITH RIGHT ANGLE ELECTRODE - USE ONLY WITH PROBE PLUS II HANDLES: Brand: ENDOPATH

## (undated) DEVICE — SUT VIC 2/0 UR6 27IN J602H

## (undated) DEVICE — PATIENT RETURN ELECTRODE, SINGLE-USE, CONTACT QUALITY MONITORING, ADULT, WITH 9FT CORD, FOR PATIENTS WEIGING OVER 33LBS. (15KG): Brand: MEGADYNE

## (undated) DEVICE — [HIGH FLOW INSUFFLATOR,  DO NOT USE IF PACKAGE IS DAMAGED,  KEEP DRY,  KEEP AWAY FROM SUNLIGHT,  PROTECT FROM HEAT AND RADIOACTIVE SOURCES.]: Brand: PNEUMOSURE

## (undated) DEVICE — MODEL AT P65, P/N 701554-001KIT CONTENTS: HAND CONTROLLER, 3-WAY HIGH-PRESSURE STOPCOCK WITH ROTATING END AND PREMIUM HIGH-PRESSURE TUBING: Brand: ANGIOTOUCH® KIT

## (undated) DEVICE — COATED VICRYL  (POLYGLACTIN 910) SUTURE, VIOLET BRAIDED, STERILE, SYNTHETIC ABSORBABLE SUTURE: Brand: COATED VICRYL

## (undated) DEVICE — SOL IRR H2O BTL 1000ML STRL

## (undated) DEVICE — PK CATH CARD 10

## (undated) DEVICE — SI AVANTI+ 6F STD W/GW  NO OBT: Brand: AVANTI

## (undated) DEVICE — RADIFOCUS GLIDECATH: Brand: GLIDECATH

## (undated) DEVICE — CLOTTRIEVER CATHETER, 16 MM, 105 CM: Brand: CLOTTRIEVER CATHETER, 16 MM

## (undated) DEVICE — SYR LUERLOK 30CC

## (undated) DEVICE — 3M™ STERI-STRIP™ REINFORCED ADHESIVE SKIN CLOSURES, R1547, 1/2 IN X 4 IN (12 MM X 100 MM), 6 STRIPS/ENVELOPE: Brand: 3M™ STERI-STRIP™

## (undated) DEVICE — MAT PREVALON MOBL TRANSFR AIR WO/PAD 39X80IN

## (undated) DEVICE — CATH UNIFS 5F135X50CM

## (undated) DEVICE — TROCAR: Brand: KII FIOS FIRST ENTRY

## (undated) DEVICE — SI AVANTI+ 5F MID W/O GW&OBT: Brand: AVANTI

## (undated) DEVICE — PAD GRND REM POLYHESIVE A/ DISP

## (undated) DEVICE — COR LAP CHOLE: Brand: MEDLINE INDUSTRIES, INC.

## (undated) DEVICE — CLOTTRIEVER SHEATH, 13 FR, 15 CM: Brand: CLOTTRIEVER SHEATH, 13 FR

## (undated) DEVICE — SPNG GZ WOVN 4X4IN 12PLY 10/BX STRL

## (undated) DEVICE — STRAP POSTN KN/BDY FM 5X72IN DISP

## (undated) DEVICE — BALN PRESS WEDGE 6F 110CM

## (undated) DEVICE — RADIFOCUS GLIDEWIRE: Brand: GLIDEWIRE

## (undated) DEVICE — INTRO SHEATH DRYSEAL FLEX 12F4TO4.7MM 33CM

## (undated) DEVICE — INFLATION DEVICE: Brand: ENCORE™ 26

## (undated) DEVICE — BNDG ADHS CURAD FLX/FABRC 2X4IN STRL LF

## (undated) DEVICE — SOL IRR NACL 0.9PCT BT 1000ML

## (undated) DEVICE — TROCAR: Brand: KII® SLEEVE